# Patient Record
Sex: MALE | Race: WHITE | NOT HISPANIC OR LATINO
[De-identification: names, ages, dates, MRNs, and addresses within clinical notes are randomized per-mention and may not be internally consistent; named-entity substitution may affect disease eponyms.]

---

## 2017-01-10 ENCOUNTER — APPOINTMENT (OUTPATIENT)
Dept: PULMONOLOGY | Facility: CLINIC | Age: 62
End: 2017-01-10

## 2017-01-10 VITALS — SYSTOLIC BLOOD PRESSURE: 124 MMHG | BODY MASS INDEX: 24.61 KG/M2 | WEIGHT: 174 LBS | DIASTOLIC BLOOD PRESSURE: 80 MMHG

## 2017-01-10 RX ORDER — MEPOLIZUMAB 100 MG/ML
100 INJECTION, POWDER, FOR SOLUTION SUBCUTANEOUS
Refills: 0 | Status: COMPLETED | OUTPATIENT
Start: 2017-01-10

## 2017-01-10 RX ADMIN — MEPOLIZUMAB 1 MG: 100 INJECTION, POWDER, FOR SOLUTION SUBCUTANEOUS at 00:00

## 2017-01-30 ENCOUNTER — TRANSCRIPTION ENCOUNTER (OUTPATIENT)
Age: 62
End: 2017-01-30

## 2017-02-21 ENCOUNTER — APPOINTMENT (OUTPATIENT)
Dept: PULMONOLOGY | Facility: CLINIC | Age: 62
End: 2017-02-21

## 2017-03-23 ENCOUNTER — APPOINTMENT (OUTPATIENT)
Dept: PULMONOLOGY | Facility: CLINIC | Age: 62
End: 2017-03-23

## 2017-03-23 VITALS — DIASTOLIC BLOOD PRESSURE: 76 MMHG | SYSTOLIC BLOOD PRESSURE: 126 MMHG | WEIGHT: 315 LBS | BODY MASS INDEX: 245.43 KG/M2

## 2017-03-23 VITALS — WEIGHT: 173.5 LBS | BODY MASS INDEX: 24.54 KG/M2

## 2017-03-23 RX ORDER — MEPOLIZUMAB 100 MG/ML
100 INJECTION, POWDER, FOR SOLUTION SUBCUTANEOUS
Refills: 0 | Status: COMPLETED | OUTPATIENT
Start: 2017-03-23

## 2017-03-23 RX ADMIN — MEPOLIZUMAB 0 MG: 100 INJECTION, POWDER, FOR SOLUTION SUBCUTANEOUS at 00:00

## 2017-04-25 ENCOUNTER — APPOINTMENT (OUTPATIENT)
Dept: PULMONOLOGY | Facility: CLINIC | Age: 62
End: 2017-04-25

## 2017-04-28 ENCOUNTER — APPOINTMENT (OUTPATIENT)
Dept: PULMONOLOGY | Facility: CLINIC | Age: 62
End: 2017-04-28

## 2017-04-28 VITALS
WEIGHT: 173 LBS | DIASTOLIC BLOOD PRESSURE: 60 MMHG | SYSTOLIC BLOOD PRESSURE: 110 MMHG | HEIGHT: 70.5 IN | BODY MASS INDEX: 24.49 KG/M2

## 2017-04-28 RX ORDER — MEPOLIZUMAB 100 MG/ML
100 INJECTION, POWDER, FOR SOLUTION SUBCUTANEOUS
Refills: 0 | Status: COMPLETED | OUTPATIENT
Start: 2017-04-28

## 2017-04-28 RX ADMIN — MEPOLIZUMAB 100 MG: 100 INJECTION, POWDER, FOR SOLUTION SUBCUTANEOUS at 00:00

## 2017-06-08 ENCOUNTER — APPOINTMENT (OUTPATIENT)
Dept: PULMONOLOGY | Facility: CLINIC | Age: 62
End: 2017-06-08

## 2017-06-08 VITALS — SYSTOLIC BLOOD PRESSURE: 114 MMHG | DIASTOLIC BLOOD PRESSURE: 72 MMHG

## 2017-06-08 RX ORDER — MEPOLIZUMAB 100 MG/ML
100 INJECTION, POWDER, FOR SOLUTION SUBCUTANEOUS
Refills: 0 | Status: COMPLETED | OUTPATIENT
Start: 2017-06-08

## 2017-06-08 RX ADMIN — MEPOLIZUMAB 0 MG: 100 INJECTION, POWDER, FOR SOLUTION SUBCUTANEOUS at 00:00

## 2017-07-11 ENCOUNTER — APPOINTMENT (OUTPATIENT)
Dept: PULMONOLOGY | Facility: CLINIC | Age: 62
End: 2017-07-11

## 2017-07-12 ENCOUNTER — OUTPATIENT (OUTPATIENT)
Dept: OUTPATIENT SERVICES | Facility: HOSPITAL | Age: 62
LOS: 1 days | Discharge: HOME | End: 2017-07-12
Payer: COMMERCIAL

## 2017-07-12 VITALS
OXYGEN SATURATION: 100 % | SYSTOLIC BLOOD PRESSURE: 127 MMHG | TEMPERATURE: 98 F | WEIGHT: 167.99 LBS | DIASTOLIC BLOOD PRESSURE: 86 MMHG | RESPIRATION RATE: 18 BRPM | HEART RATE: 64 BPM | HEIGHT: 71 IN

## 2017-07-12 DIAGNOSIS — Z98.890 OTHER SPECIFIED POSTPROCEDURAL STATES: Chronic | ICD-10-CM

## 2017-07-12 DIAGNOSIS — M16.11 UNILATERAL PRIMARY OSTEOARTHRITIS, RIGHT HIP: ICD-10-CM

## 2017-07-12 LAB
ABO RH CONFIRMATION: SIGNIFICANT CHANGE UP
ANION GAP SERPL CALC-SCNC: 6 MMOL/L — SIGNIFICANT CHANGE UP (ref 5–17)
APPEARANCE UR: CLEAR — SIGNIFICANT CHANGE UP
BACTERIA # UR AUTO: (no result)
BASOPHILS # BLD AUTO: 0.1 K/UL — SIGNIFICANT CHANGE UP (ref 0–0.2)
BASOPHILS NFR BLD AUTO: 1.7 % — SIGNIFICANT CHANGE UP (ref 0–2)
BILIRUB UR-MCNC: NEGATIVE — SIGNIFICANT CHANGE UP
BLD GP AB SCN SERPL QL: SIGNIFICANT CHANGE UP
BUN SERPL-MCNC: 16 MG/DL — SIGNIFICANT CHANGE UP (ref 7–23)
CALCIUM SERPL-MCNC: 9.8 MG/DL — SIGNIFICANT CHANGE UP (ref 8.5–10.1)
CHLORIDE SERPL-SCNC: 105 MMOL/L — SIGNIFICANT CHANGE UP (ref 96–108)
CO2 SERPL-SCNC: 27 MMOL/L — SIGNIFICANT CHANGE UP (ref 22–31)
COLOR SPEC: YELLOW — SIGNIFICANT CHANGE UP
CREAT SERPL-MCNC: 0.77 MG/DL — SIGNIFICANT CHANGE UP (ref 0.5–1.3)
DIFF PNL FLD: (no result)
EOSINOPHIL # BLD AUTO: 0.1 K/UL — SIGNIFICANT CHANGE UP (ref 0–0.5)
EOSINOPHIL NFR BLD AUTO: 2 % — SIGNIFICANT CHANGE UP (ref 0–6)
EPI CELLS # UR: SIGNIFICANT CHANGE UP
GLUCOSE SERPL-MCNC: 83 MG/DL — SIGNIFICANT CHANGE UP (ref 70–99)
GLUCOSE UR QL: NEGATIVE MG/DL — SIGNIFICANT CHANGE UP
HCT VFR BLD CALC: 46.8 % — SIGNIFICANT CHANGE UP (ref 39–50)
HGB BLD-MCNC: 15.6 G/DL — SIGNIFICANT CHANGE UP (ref 13–17)
HYALINE CASTS # UR AUTO: (no result) /LPF
KETONES UR-MCNC: NEGATIVE — SIGNIFICANT CHANGE UP
LEUKOCYTE ESTERASE UR-ACNC: (no result)
LYMPHOCYTES # BLD AUTO: 0.9 K/UL — LOW (ref 1–3.3)
LYMPHOCYTES # BLD AUTO: 13.1 % — SIGNIFICANT CHANGE UP (ref 13–44)
MCHC RBC-ENTMCNC: 30.5 PG — SIGNIFICANT CHANGE UP (ref 27–34)
MCHC RBC-ENTMCNC: 33.4 GM/DL — SIGNIFICANT CHANGE UP (ref 32–36)
MCV RBC AUTO: 91.5 FL — SIGNIFICANT CHANGE UP (ref 80–100)
MONOCYTES # BLD AUTO: 0.8 K/UL — SIGNIFICANT CHANGE UP (ref 0–0.9)
MONOCYTES NFR BLD AUTO: 12.3 % — SIGNIFICANT CHANGE UP (ref 2–14)
MRSA PCR RESULT.: SIGNIFICANT CHANGE UP
NEUTROPHILS # BLD AUTO: 4.7 K/UL — SIGNIFICANT CHANGE UP (ref 1.8–7.4)
NEUTROPHILS NFR BLD AUTO: 70.9 % — SIGNIFICANT CHANGE UP (ref 43–77)
NITRITE UR-MCNC: NEGATIVE — SIGNIFICANT CHANGE UP
PH UR: 6 — SIGNIFICANT CHANGE UP (ref 5–8)
PLATELET # BLD AUTO: 233 K/UL — SIGNIFICANT CHANGE UP (ref 150–400)
POTASSIUM SERPL-MCNC: 4.2 MMOL/L — SIGNIFICANT CHANGE UP (ref 3.5–5.3)
POTASSIUM SERPL-SCNC: 4.2 MMOL/L — SIGNIFICANT CHANGE UP (ref 3.5–5.3)
PROT UR-MCNC: NEGATIVE MG/DL — SIGNIFICANT CHANGE UP
RBC # BLD: 5.12 M/UL — SIGNIFICANT CHANGE UP (ref 4.2–5.8)
RBC # FLD: 13.3 % — SIGNIFICANT CHANGE UP (ref 10.3–14.5)
RBC CASTS # UR COMP ASSIST: (no result) /HPF (ref 0–4)
S AUREUS DNA NOSE QL NAA+PROBE: SIGNIFICANT CHANGE UP
SODIUM SERPL-SCNC: 138 MMOL/L — SIGNIFICANT CHANGE UP (ref 135–145)
SP GR SPEC: 1.01 — SIGNIFICANT CHANGE UP (ref 1.01–1.02)
TYPE + AB SCN PNL BLD: SIGNIFICANT CHANGE UP
UROBILINOGEN FLD QL: NEGATIVE MG/DL — SIGNIFICANT CHANGE UP
WBC # BLD: 6.7 K/UL — SIGNIFICANT CHANGE UP (ref 3.8–10.5)
WBC # FLD AUTO: 6.7 K/UL — SIGNIFICANT CHANGE UP (ref 3.8–10.5)
WBC UR QL: SIGNIFICANT CHANGE UP

## 2017-07-12 PROCEDURE — 73502 X-RAY EXAM HIP UNI 2-3 VIEWS: CPT | Mod: 26

## 2017-07-12 RX ORDER — VALSARTAN 80 MG/1
1 TABLET ORAL
Qty: 0 | Refills: 0 | COMMUNITY

## 2017-07-12 NOTE — H&P PST ADULT - PSH
H/O arthroscopy of shoulder  left 2/ 2016  H/O hernia repair  11/3/2016  H/O lithotripsy  2015  H/O sinus surgery  x 3

## 2017-07-12 NOTE — H&P PST ADULT - FAMILY HISTORY
Father  Still living? No  Family history of multiple myeloma, Age at diagnosis: Age Unknown     Mother  Still living? No  Family history of pancreatic cancer, Age at diagnosis: Age Unknown

## 2017-07-12 NOTE — H&P PST ADULT - PMH
Diverticulosis of intestine without bleeding, unspecified intestinal tract location    Essential hypertension    History of kidney stones    Lumbar herniated disc    MVP (mitral valve prolapse)    Nasal polyps  history of  Osteoarthritis of right hip, unspecified osteoarthritis type    Seasonal allergic rhinitis due to pollen    Tinnitus of both ears    Uncomplicated asthma, unspecified asthma severity

## 2017-07-12 NOTE — H&P PST ADULT - ASSESSMENT
62 years old male present to PST prior to right hip replacement with Dr. Husam Olmedo III.  Plan   1. NPO after midnight  2. Take the following medications with sips of water on the day of procedure: Bystolic, Valsartan, Montelukast  3. Use E-Z sponge as directed  4. Use Mupirocin as directed.  5. Drink a quart of extra  fluids the day before your surgery.  6 Medical clearance with Dr. Kedar Gallego  7. CBC, BMP, Urinalysis, Type and Screen, MRSA sent to lab  8. EKG and Hip/Pelvic X-ray done      Caprini score of 8

## 2017-07-24 RX ORDER — OXYCODONE HYDROCHLORIDE 5 MG/1
10 TABLET ORAL EVERY 12 HOURS
Qty: 0 | Refills: 0 | Status: DISCONTINUED | OUTPATIENT
Start: 2017-07-25 | End: 2017-07-27

## 2017-07-24 RX ORDER — CELECOXIB 200 MG/1
200 CAPSULE ORAL ONCE
Qty: 0 | Refills: 0 | Status: COMPLETED | OUTPATIENT
Start: 2017-07-25 | End: 2017-07-25

## 2017-07-24 RX ORDER — MEPERIDINE HYDROCHLORIDE 50 MG/ML
12.5 INJECTION INTRAMUSCULAR; INTRAVENOUS; SUBCUTANEOUS
Qty: 0 | Refills: 0 | Status: DISCONTINUED | OUTPATIENT
Start: 2017-07-25 | End: 2017-07-25

## 2017-07-24 RX ORDER — SODIUM CHLORIDE 9 MG/ML
3 INJECTION INTRAMUSCULAR; INTRAVENOUS; SUBCUTANEOUS EVERY 8 HOURS
Qty: 0 | Refills: 0 | Status: DISCONTINUED | OUTPATIENT
Start: 2017-07-25 | End: 2017-07-27

## 2017-07-24 RX ORDER — ACETAMINOPHEN 500 MG
1000 TABLET ORAL ONCE
Qty: 0 | Refills: 0 | Status: COMPLETED | OUTPATIENT
Start: 2017-07-25 | End: 2017-07-25

## 2017-07-24 RX ORDER — HYDROMORPHONE HYDROCHLORIDE 2 MG/ML
1 INJECTION INTRAMUSCULAR; INTRAVENOUS; SUBCUTANEOUS
Qty: 0 | Refills: 0 | Status: DISCONTINUED | OUTPATIENT
Start: 2017-07-25 | End: 2017-07-27

## 2017-07-24 RX ORDER — OXYCODONE HYDROCHLORIDE 5 MG/1
10 TABLET ORAL
Qty: 0 | Refills: 0 | Status: DISCONTINUED | OUTPATIENT
Start: 2017-07-25 | End: 2017-07-27

## 2017-07-24 RX ORDER — HYDROMORPHONE HYDROCHLORIDE 2 MG/ML
1 INJECTION INTRAMUSCULAR; INTRAVENOUS; SUBCUTANEOUS
Qty: 0 | Refills: 0 | Status: DISCONTINUED | OUTPATIENT
Start: 2017-07-25 | End: 2017-07-25

## 2017-07-24 RX ORDER — OXYCODONE HYDROCHLORIDE 5 MG/1
5 TABLET ORAL
Qty: 0 | Refills: 0 | Status: DISCONTINUED | OUTPATIENT
Start: 2017-07-25 | End: 2017-07-27

## 2017-07-24 RX ORDER — ONDANSETRON 8 MG/1
4 TABLET, FILM COATED ORAL EVERY 6 HOURS
Qty: 0 | Refills: 0 | Status: DISCONTINUED | OUTPATIENT
Start: 2017-07-25 | End: 2017-07-27

## 2017-07-24 RX ORDER — ACETAMINOPHEN 500 MG
650 TABLET ORAL EVERY 6 HOURS
Qty: 0 | Refills: 0 | Status: DISCONTINUED | OUTPATIENT
Start: 2017-07-25 | End: 2017-07-27

## 2017-07-24 RX ORDER — FENTANYL CITRATE 50 UG/ML
50 INJECTION INTRAVENOUS
Qty: 0 | Refills: 0 | Status: DISCONTINUED | OUTPATIENT
Start: 2017-07-25 | End: 2017-07-25

## 2017-07-24 RX ORDER — ACETAMINOPHEN 500 MG
650 TABLET ORAL ONCE
Qty: 0 | Refills: 0 | Status: COMPLETED | OUTPATIENT
Start: 2017-07-25 | End: 2017-07-25

## 2017-07-24 RX ORDER — SODIUM CHLORIDE 9 MG/ML
1000 INJECTION INTRAMUSCULAR; INTRAVENOUS; SUBCUTANEOUS
Qty: 0 | Refills: 0 | Status: DISCONTINUED | OUTPATIENT
Start: 2017-07-25 | End: 2017-07-25

## 2017-07-24 RX ORDER — OXYCODONE HYDROCHLORIDE 5 MG/1
10 TABLET ORAL ONCE
Qty: 0 | Refills: 0 | Status: DISCONTINUED | OUTPATIENT
Start: 2017-07-25 | End: 2017-07-25

## 2017-07-24 RX ORDER — ONDANSETRON 8 MG/1
4 TABLET, FILM COATED ORAL ONCE
Qty: 0 | Refills: 0 | Status: DISCONTINUED | OUTPATIENT
Start: 2017-07-25 | End: 2017-07-25

## 2017-07-24 RX ORDER — CELECOXIB 200 MG/1
200 CAPSULE ORAL
Qty: 0 | Refills: 0 | Status: DISCONTINUED | OUTPATIENT
Start: 2017-07-25 | End: 2017-07-27

## 2017-07-25 ENCOUNTER — RESULT REVIEW (OUTPATIENT)
Age: 62
End: 2017-07-25

## 2017-07-25 ENCOUNTER — INPATIENT (INPATIENT)
Facility: HOSPITAL | Age: 62
LOS: 1 days | Discharge: HOME HEALTH | End: 2017-07-27
Attending: ORTHOPAEDIC SURGERY | Admitting: ORTHOPAEDIC SURGERY
Payer: COMMERCIAL

## 2017-07-25 VITALS
WEIGHT: 167.99 LBS | DIASTOLIC BLOOD PRESSURE: 95 MMHG | HEART RATE: 49 BPM | TEMPERATURE: 98 F | HEIGHT: 72 IN | RESPIRATION RATE: 16 BRPM | SYSTOLIC BLOOD PRESSURE: 144 MMHG

## 2017-07-25 DIAGNOSIS — Z98.890 OTHER SPECIFIED POSTPROCEDURAL STATES: Chronic | ICD-10-CM

## 2017-07-25 LAB
ANION GAP SERPL CALC-SCNC: 9 MMOL/L — SIGNIFICANT CHANGE UP (ref 5–17)
APTT BLD: 33.8 SEC — SIGNIFICANT CHANGE UP (ref 27.5–37.4)
BUN SERPL-MCNC: 13 MG/DL — SIGNIFICANT CHANGE UP (ref 7–23)
CALCIUM SERPL-MCNC: 8.2 MG/DL — LOW (ref 8.5–10.1)
CHLORIDE SERPL-SCNC: 111 MMOL/L — HIGH (ref 96–108)
CO2 SERPL-SCNC: 24 MMOL/L — SIGNIFICANT CHANGE UP (ref 22–31)
CREAT SERPL-MCNC: 0.68 MG/DL — SIGNIFICANT CHANGE UP (ref 0.5–1.3)
GLUCOSE SERPL-MCNC: 98 MG/DL — SIGNIFICANT CHANGE UP (ref 70–99)
HCT VFR BLD CALC: 36.1 % — LOW (ref 39–50)
HGB BLD-MCNC: 11.8 G/DL — LOW (ref 13–17)
INR BLD: 0.97 RATIO — SIGNIFICANT CHANGE UP (ref 0.88–1.16)
MCHC RBC-ENTMCNC: 29.8 PG — SIGNIFICANT CHANGE UP (ref 27–34)
MCHC RBC-ENTMCNC: 32.6 GM/DL — SIGNIFICANT CHANGE UP (ref 32–36)
MCV RBC AUTO: 91.4 FL — SIGNIFICANT CHANGE UP (ref 80–100)
PLATELET # BLD AUTO: 204 K/UL — SIGNIFICANT CHANGE UP (ref 150–400)
POTASSIUM SERPL-MCNC: 3.6 MMOL/L — SIGNIFICANT CHANGE UP (ref 3.5–5.3)
POTASSIUM SERPL-SCNC: 3.6 MMOL/L — SIGNIFICANT CHANGE UP (ref 3.5–5.3)
PROTHROM AB SERPL-ACNC: 10.5 SEC — SIGNIFICANT CHANGE UP (ref 9.8–12.7)
RBC # BLD: 3.95 M/UL — LOW (ref 4.2–5.8)
RBC # FLD: 13.2 % — SIGNIFICANT CHANGE UP (ref 10.3–14.5)
SODIUM SERPL-SCNC: 144 MMOL/L — SIGNIFICANT CHANGE UP (ref 135–145)
WBC # BLD: 4.2 K/UL — SIGNIFICANT CHANGE UP (ref 3.8–10.5)
WBC # FLD AUTO: 4.2 K/UL — SIGNIFICANT CHANGE UP (ref 3.8–10.5)

## 2017-07-25 PROCEDURE — 74000: CPT | Mod: 26

## 2017-07-25 PROCEDURE — 73501 X-RAY EXAM HIP UNI 1 VIEW: CPT | Mod: 26

## 2017-07-25 PROCEDURE — 88305 TISSUE EXAM BY PATHOLOGIST: CPT | Mod: 26

## 2017-07-25 PROCEDURE — 99253 IP/OBS CNSLTJ NEW/EST LOW 45: CPT

## 2017-07-25 RX ORDER — ZOLPIDEM TARTRATE 10 MG/1
5 TABLET ORAL AT BEDTIME
Qty: 0 | Refills: 0 | Status: DISCONTINUED | OUTPATIENT
Start: 2017-07-25 | End: 2017-07-27

## 2017-07-25 RX ORDER — HYDROCHLOROTHIAZIDE 25 MG
12.5 TABLET ORAL DAILY
Qty: 0 | Refills: 0 | Status: DISCONTINUED | OUTPATIENT
Start: 2017-07-25 | End: 2017-07-25

## 2017-07-25 RX ORDER — VALSARTAN 80 MG/1
40 TABLET ORAL DAILY
Qty: 0 | Refills: 0 | Status: DISCONTINUED | OUTPATIENT
Start: 2017-07-25 | End: 2017-07-27

## 2017-07-25 RX ORDER — MORPHINE SULFATE 50 MG/1
4 CAPSULE, EXTENDED RELEASE ORAL
Qty: 0 | Refills: 0 | Status: DISCONTINUED | OUTPATIENT
Start: 2017-07-25 | End: 2017-07-27

## 2017-07-25 RX ORDER — HYDROMORPHONE HYDROCHLORIDE 2 MG/ML
1 INJECTION INTRAMUSCULAR; INTRAVENOUS; SUBCUTANEOUS
Qty: 0 | Refills: 0 | Status: DISCONTINUED | OUTPATIENT
Start: 2017-07-25 | End: 2017-07-27

## 2017-07-25 RX ORDER — ACETAMINOPHEN 500 MG
650 TABLET ORAL EVERY 6 HOURS
Qty: 0 | Refills: 0 | Status: DISCONTINUED | OUTPATIENT
Start: 2017-07-25 | End: 2017-07-27

## 2017-07-25 RX ORDER — ASCORBIC ACID 60 MG
500 TABLET,CHEWABLE ORAL
Qty: 0 | Refills: 0 | Status: DISCONTINUED | OUTPATIENT
Start: 2017-07-25 | End: 2017-07-27

## 2017-07-25 RX ORDER — MONTELUKAST 4 MG/1
10 TABLET, CHEWABLE ORAL DAILY
Qty: 0 | Refills: 0 | Status: DISCONTINUED | OUTPATIENT
Start: 2017-07-25 | End: 2017-07-27

## 2017-07-25 RX ORDER — FLUTICASONE PROPIONATE AND SALMETEROL 50; 250 UG/1; UG/1
1 POWDER ORAL; RESPIRATORY (INHALATION) DAILY
Qty: 0 | Refills: 0 | Status: DISCONTINUED | OUTPATIENT
Start: 2017-07-25 | End: 2017-07-27

## 2017-07-25 RX ORDER — POLYETHYLENE GLYCOL 3350 17 G/17G
17 POWDER, FOR SOLUTION ORAL DAILY
Qty: 0 | Refills: 0 | Status: DISCONTINUED | OUTPATIENT
Start: 2017-07-25 | End: 2017-07-27

## 2017-07-25 RX ORDER — ONDANSETRON 8 MG/1
4 TABLET, FILM COATED ORAL EVERY 6 HOURS
Qty: 0 | Refills: 0 | Status: DISCONTINUED | OUTPATIENT
Start: 2017-07-25 | End: 2017-07-27

## 2017-07-25 RX ORDER — BENZOCAINE AND MENTHOL 5; 1 G/100ML; G/100ML
1 LIQUID ORAL EVERY 4 HOURS
Qty: 0 | Refills: 0 | Status: DISCONTINUED | OUTPATIENT
Start: 2017-07-25 | End: 2017-07-27

## 2017-07-25 RX ORDER — OXYCODONE HYDROCHLORIDE 5 MG/1
5 TABLET ORAL
Qty: 0 | Refills: 0 | Status: DISCONTINUED | OUTPATIENT
Start: 2017-07-25 | End: 2017-07-27

## 2017-07-25 RX ORDER — MAGNESIUM HYDROXIDE 400 MG/1
30 TABLET, CHEWABLE ORAL DAILY
Qty: 0 | Refills: 0 | Status: DISCONTINUED | OUTPATIENT
Start: 2017-07-25 | End: 2017-07-27

## 2017-07-25 RX ORDER — ALPRAZOLAM 0.25 MG
0.25 TABLET ORAL ONCE
Qty: 0 | Refills: 0 | Status: DISCONTINUED | OUTPATIENT
Start: 2017-07-25 | End: 2017-07-25

## 2017-07-25 RX ORDER — FERROUS SULFATE 325(65) MG
325 TABLET ORAL
Qty: 0 | Refills: 0 | Status: DISCONTINUED | OUTPATIENT
Start: 2017-07-25 | End: 2017-07-27

## 2017-07-25 RX ORDER — ROPIVACAINE HCL/PF 5 MG/ML
200 AMPUL (ML) INJECTION
Qty: 0 | Refills: 0 | Status: DISCONTINUED | OUTPATIENT
Start: 2017-07-25 | End: 2017-07-25

## 2017-07-25 RX ORDER — FOLIC ACID 0.8 MG
1 TABLET ORAL DAILY
Qty: 0 | Refills: 0 | Status: DISCONTINUED | OUTPATIENT
Start: 2017-07-25 | End: 2017-07-27

## 2017-07-25 RX ORDER — DOCUSATE SODIUM 100 MG
100 CAPSULE ORAL THREE TIMES A DAY
Qty: 0 | Refills: 0 | Status: DISCONTINUED | OUTPATIENT
Start: 2017-07-25 | End: 2017-07-27

## 2017-07-25 RX ORDER — SENNA PLUS 8.6 MG/1
2 TABLET ORAL AT BEDTIME
Qty: 0 | Refills: 0 | Status: DISCONTINUED | OUTPATIENT
Start: 2017-07-25 | End: 2017-07-27

## 2017-07-25 RX ORDER — OXYCODONE HYDROCHLORIDE 5 MG/1
10 TABLET ORAL EVERY 4 HOURS
Qty: 0 | Refills: 0 | Status: DISCONTINUED | OUTPATIENT
Start: 2017-07-25 | End: 2017-07-27

## 2017-07-25 RX ORDER — CEFAZOLIN SODIUM 1 G
2000 VIAL (EA) INJECTION EVERY 6 HOURS
Qty: 0 | Refills: 0 | Status: COMPLETED | OUTPATIENT
Start: 2017-07-25 | End: 2017-07-25

## 2017-07-25 RX ORDER — ASPIRIN/CALCIUM CARB/MAGNESIUM 324 MG
325 TABLET ORAL
Qty: 0 | Refills: 0 | Status: DISCONTINUED | OUTPATIENT
Start: 2017-07-26 | End: 2017-07-27

## 2017-07-25 RX ORDER — FAMOTIDINE 10 MG/ML
20 INJECTION INTRAVENOUS ONCE
Qty: 0 | Refills: 0 | Status: COMPLETED | OUTPATIENT
Start: 2017-07-25 | End: 2017-07-25

## 2017-07-25 RX ORDER — ROPIVACAINE HCL/PF 5 MG/ML
200 AMPUL (ML) INJECTION
Qty: 0 | Refills: 0 | Status: DISCONTINUED | OUTPATIENT
Start: 2017-07-25 | End: 2017-07-27

## 2017-07-25 RX ORDER — DIPHENHYDRAMINE HCL 50 MG
25 CAPSULE ORAL EVERY 4 HOURS
Qty: 0 | Refills: 0 | Status: DISCONTINUED | OUTPATIENT
Start: 2017-07-25 | End: 2017-07-27

## 2017-07-25 RX ORDER — OXYCODONE HYDROCHLORIDE 5 MG/1
10 TABLET ORAL
Qty: 0 | Refills: 0 | Status: DISCONTINUED | OUTPATIENT
Start: 2017-07-25 | End: 2017-07-27

## 2017-07-25 RX ORDER — DIPHENHYDRAMINE HCL 50 MG
25 CAPSULE ORAL AT BEDTIME
Qty: 0 | Refills: 0 | Status: DISCONTINUED | OUTPATIENT
Start: 2017-07-25 | End: 2017-07-25

## 2017-07-25 RX ORDER — OXYCODONE HYDROCHLORIDE 5 MG/1
5 TABLET ORAL EVERY 4 HOURS
Qty: 0 | Refills: 0 | Status: DISCONTINUED | OUTPATIENT
Start: 2017-07-25 | End: 2017-07-27

## 2017-07-25 RX ORDER — SODIUM CHLORIDE 9 MG/ML
1000 INJECTION, SOLUTION INTRAVENOUS
Qty: 0 | Refills: 0 | Status: DISCONTINUED | OUTPATIENT
Start: 2017-07-25 | End: 2017-07-27

## 2017-07-25 RX ORDER — NEBIVOLOL HYDROCHLORIDE 5 MG/1
2.5 TABLET ORAL DAILY
Qty: 0 | Refills: 0 | Status: DISCONTINUED | OUTPATIENT
Start: 2017-07-25 | End: 2017-07-27

## 2017-07-25 RX ADMIN — Medication 650 MILLIGRAM(S): at 07:17

## 2017-07-25 RX ADMIN — HYDROMORPHONE HYDROCHLORIDE 1 MILLIGRAM(S): 2 INJECTION INTRAMUSCULAR; INTRAVENOUS; SUBCUTANEOUS at 13:30

## 2017-07-25 RX ADMIN — Medication 1000 MILLIGRAM(S): at 12:08

## 2017-07-25 RX ADMIN — FENTANYL CITRATE 50 MICROGRAM(S): 50 INJECTION INTRAVENOUS at 10:29

## 2017-07-25 RX ADMIN — OXYCODONE HYDROCHLORIDE 10 MILLIGRAM(S): 5 TABLET ORAL at 07:17

## 2017-07-25 RX ADMIN — SODIUM CHLORIDE 75 MILLILITER(S): 9 INJECTION, SOLUTION INTRAVENOUS at 17:52

## 2017-07-25 RX ADMIN — SODIUM CHLORIDE 3 MILLILITER(S): 9 INJECTION INTRAMUSCULAR; INTRAVENOUS; SUBCUTANEOUS at 20:03

## 2017-07-25 RX ADMIN — SODIUM CHLORIDE 3 MILLILITER(S): 9 INJECTION INTRAMUSCULAR; INTRAVENOUS; SUBCUTANEOUS at 13:13

## 2017-07-25 RX ADMIN — Medication 100 MILLIGRAM(S): at 21:33

## 2017-07-25 RX ADMIN — Medication 100 MILLIGRAM(S): at 13:16

## 2017-07-25 RX ADMIN — Medication 650 MILLIGRAM(S): at 17:53

## 2017-07-25 RX ADMIN — CELECOXIB 200 MILLIGRAM(S): 200 CAPSULE ORAL at 07:17

## 2017-07-25 RX ADMIN — OXYCODONE HYDROCHLORIDE 10 MILLIGRAM(S): 5 TABLET ORAL at 17:48

## 2017-07-25 RX ADMIN — FENTANYL CITRATE 50 MICROGRAM(S): 50 INJECTION INTRAVENOUS at 10:43

## 2017-07-25 RX ADMIN — Medication 325 MILLIGRAM(S): at 17:06

## 2017-07-25 RX ADMIN — SODIUM CHLORIDE 75 MILLILITER(S): 9 INJECTION INTRAMUSCULAR; INTRAVENOUS; SUBCUTANEOUS at 09:51

## 2017-07-25 RX ADMIN — OXYCODONE HYDROCHLORIDE 10 MILLIGRAM(S): 5 TABLET ORAL at 16:20

## 2017-07-25 RX ADMIN — OXYCODONE HYDROCHLORIDE 10 MILLIGRAM(S): 5 TABLET ORAL at 15:25

## 2017-07-25 RX ADMIN — Medication 200 MILLILITER(S): at 11:12

## 2017-07-25 RX ADMIN — MONTELUKAST 10 MILLIGRAM(S): 4 TABLET, CHEWABLE ORAL at 23:19

## 2017-07-25 RX ADMIN — Medication 650 MILLIGRAM(S): at 23:20

## 2017-07-25 RX ADMIN — Medication 500 MILLIGRAM(S): at 17:06

## 2017-07-25 RX ADMIN — OXYCODONE HYDROCHLORIDE 10 MILLIGRAM(S): 5 TABLET ORAL at 20:02

## 2017-07-25 RX ADMIN — OXYCODONE HYDROCHLORIDE 10 MILLIGRAM(S): 5 TABLET ORAL at 17:06

## 2017-07-25 RX ADMIN — Medication 325 MILLIGRAM(S): at 13:16

## 2017-07-25 RX ADMIN — Medication 400 MILLIGRAM(S): at 11:38

## 2017-07-25 RX ADMIN — Medication 0.25 MILLIGRAM(S): at 23:19

## 2017-07-25 RX ADMIN — HYDROMORPHONE HYDROCHLORIDE 1 MILLIGRAM(S): 2 INJECTION INTRAMUSCULAR; INTRAVENOUS; SUBCUTANEOUS at 13:16

## 2017-07-25 NOTE — CONSULT NOTE ADULT - SUBJECTIVE AND OBJECTIVE BOX
PCP: Dr. Kedar Gallego    Chief Complaint: right hip pain    HPI: 62M, pmh of HTN, HLD, Mitral regurgitation, diverticulosis, nephrolithiasis, lumbar herniated disc, tinnitus, asthma, fatty liver, OA right hip who is admitted for elective right hip replacement. Hospitalist service consulted for medical comanagement.   Pt seen and examined on 2N. Feels well. Worked with physical therapy already and states he did great. He does have some pain/soreness at surgical site now. NO sob/chest pain. NO n/v/d/abd pain.     REVIEW OF SYSTEMS:all 10 systems reviewed and is as per hpi otherwise negative.     PMH: as above  PSH: Left shoulder athroscopy, hernia repair, lithotripsy, sinus surgery, rotator cuff surgery,   F/H: father-multiple myeloma, Mother-pancreatic  ca  Social: No significant tobacco use.              ETOH: occasional.   Allergies: NKDA  H. MEds: please see med rec.    Vital Signs Last 24 Hrs  T(C): 36.2 (25 Jul 2017 12:10), Max: 36.6 (25 Jul 2017 07:00)  T(F): 97.1 (25 Jul 2017 12:10), Max: 97.8 (25 Jul 2017 07:00)  HR: 46 (25 Jul 2017 12:10) (44 - 49)  BP: 115/83 (25 Jul 2017 12:10) (101/61 - 144/95)  BP(mean): --  RR: 16 (25 Jul 2017 12:10) (12 - 16)  SpO2: 98% (25 Jul 2017 12:10) (98% - 100%)      PHYSICAL EXAM:    Constitutional: NAD, awake and alert, well-developed  HEENT: PERR, EOMI, Normal Hearing, MMM  Neck: Soft and supple, No LAD, No JVD  Respiratory: Breath sounds are clear bilaterally, No wheezing, rales or rhonchi  Cardiovascular: S1 and S2, regular rate and rhythm,   Gastrointestinal: Bowel Sounds present, soft, nontender, nondistended, no guarding, no rebound  Extremities: No peripheral edema. RLE dressing intact. +hemovac  Vascular: 2+ peripheral pulses  Neurological: A/O x 3, no focal deficits  Musculoskeletal: 5/5 strength b/l upper and lower extremities  Skin: No rashes    Medications:  MEDICATIONS  (STANDING):  sodium chloride 0.9% lock flush 3 milliLiter(s) IV Push every 8 hours  acetaminophen   Tablet 650 milliGRAM(s) Oral every 6 hours  oxyCODONE  ER Tablet 10 milliGRAM(s) Oral every 12 hours  celecoxib 200 milliGRAM(s) Oral with breakfast  valsartan 40 milliGRAM(s) Oral daily  montelukast 10 milliGRAM(s) Oral daily  hydrochlorothiazide 12.5 milliGRAM(s) Oral daily  lactated ringers. 1000 milliLiter(s) (75 mL/Hr) IV Continuous <Continuous>  ceFAZolin   IVPB 2000 milliGRAM(s) IV Intermittent every 6 hours  polyethylene glycol 3350 17 Gram(s) Oral daily  docusate sodium 100 milliGRAM(s) Oral three times a day  ferrous    sulfate 325 milliGRAM(s) Oral three times a day with meals  folic acid 1 milliGRAM(s) Oral daily  multivitamin 1 Tablet(s) Oral daily  ascorbic acid 500 milliGRAM(s) Oral two times a day  ropivacaine 0.2% in 0.9% Sodium Chloride PCRA 200 milliLiter(s) Regional Catheter PCA Continuous      Labs: All Labs Reviewed:                        11.8   4.2   )-----------( 204      ( 25 Jul 2017 09:52 )             36.1     07-25    144  |  111<H>  |  13  ----------------------------<  98  3.6   |  24  |  0.68    Ca    8.2<L>      25 Jul 2017 09:52      PT/INR - ( 25 Jul 2017 07:17 )   PT: 10.5 sec;   INR: 0.97 ratio         PTT - ( 25 Jul 2017 07:17 )  PTT:33.8 sec    Assessment and plan:  62M, pmh as above a/w:    1. Right hip OA s/p THR:  -POD#0  -pain control  -physical therapy  -incentive spirometry    2. HTN:  -stable.  -continue arb, bystlic  -hold hctz while on ivf    3. HLD:  -intolerant to statins    4. Asthma:  -stable  -continue advair    5. DVT px.    Thank you for this consultation, will follow with you.

## 2017-07-25 NOTE — PHYSICAL THERAPY INITIAL EVALUATION ADULT - CRITERIA FOR SKILLED THERAPEUTIC INTERVENTIONS
therapy frequency/functional limitations in following categories/impairments found/anticipated discharge recommendation/risk reduction/prevention/rehab potential/predicted duration of therapy intervention/anticipated equipment needs at discharge

## 2017-07-25 NOTE — CONSULT NOTE ADULT - ASSESSMENT
This is a 62 year old male s/p right THR today 7-25-17.  Pt has high thrombosis risk and requires anticoagulation prophylaxis.      Aspirin 325mg  Enteric-Coated twice a day for four weeks post procedure  :daily cbc/bmp  :le venodynes  :increase mobility as tolerated  :thanks for consult will sign off please reconsult if needed.

## 2017-07-25 NOTE — PHYSICAL THERAPY INITIAL EVALUATION ADULT - ACTIVE RANGE OF MOTION EXAMINATION, REHAB EVAL
bilateral  lower extremity Active ROM was WFL (within functional limits)/except R hip flex at least 20-30 deg. R knee flex at least 90 deg while sitting and about 40 deg in supine to avoide R hip precautions/bilateral upper extremity Active ROM was WFL (within functional limits)

## 2017-07-25 NOTE — PROGRESS NOTE ADULT - SUBJECTIVE AND OBJECTIVE BOX
Orthopedics Post-op Check    POD 0 Total Hip Arthroplasty  Pain is moderate. No nausea or vomiting. Not lightheaded.    Vital Signs Last 24 Hrs  T(C): 36.4 (07-25-17 @ 11:30), Max: 36.6 (07-25-17 @ 07:00)  T(F): 97.5 (07-25-17 @ 11:30), Max: 97.8 (07-25-17 @ 07:00)  HR: 47 (07-25-17 @ 11:30) (44 - 49)  BP: 117/74 (07-25-17 @ 11:30) (101/61 - 144/95)  BP(mean): --  RR: 14 (07-25-17 @ 11:30) (12 - 16)  SpO2: 100% (07-25-17 @ 11:30) (99% - 100%)                        11.8   4.2   )-----------( 204      ( 25 Jul 2017 09:52 )             36.1     25 Jul 2017 09:52    144    |  111    |  13     ----------------------------<  98     3.6     |  24     |  0.68     Ca    8.2        25 Jul 2017 09:52      PT/INR - ( 25 Jul 2017 07:17 )   PT: 10.5 sec;   INR: 0.97 ratio         PTT - ( 25 Jul 2017 07:17 )  PTT:33.8 sec  Exam:  NAD AAOx3  Dressing clean and dry  +EHL FHL TA GS  SILT toes 1-5  +DP  Calf Soft NT    A/P:  Stable POD 0 Right Total Hip Arthroplasty  -Analgesia  -Ppx ABX  -DVT PE ppx  -OOB PT posterior dislocation precautions

## 2017-07-25 NOTE — PHYSICAL THERAPY INITIAL EVALUATION ADULT - GENERAL OBSERVATIONS, REHAB EVAL
Pt. rec'd supine in bed on 2N w/ flowtrons, HVAC, ABD pillow, IV, CRA in place. Pt. agreeable to participate with PT and OOB.

## 2017-07-26 ENCOUNTER — TRANSCRIPTION ENCOUNTER (OUTPATIENT)
Age: 62
End: 2017-07-26

## 2017-07-26 LAB
ANION GAP SERPL CALC-SCNC: 7 MMOL/L — SIGNIFICANT CHANGE UP (ref 5–17)
BUN SERPL-MCNC: 9 MG/DL — SIGNIFICANT CHANGE UP (ref 7–23)
CALCIUM SERPL-MCNC: 8.4 MG/DL — LOW (ref 8.5–10.1)
CHLORIDE SERPL-SCNC: 105 MMOL/L — SIGNIFICANT CHANGE UP (ref 96–108)
CO2 SERPL-SCNC: 28 MMOL/L — SIGNIFICANT CHANGE UP (ref 22–31)
CREAT SERPL-MCNC: 0.6 MG/DL — SIGNIFICANT CHANGE UP (ref 0.5–1.3)
GLUCOSE SERPL-MCNC: 95 MG/DL — SIGNIFICANT CHANGE UP (ref 70–99)
HCT VFR BLD CALC: 35.8 % — LOW (ref 39–50)
HGB BLD-MCNC: 11.9 G/DL — LOW (ref 13–17)
MCHC RBC-ENTMCNC: 30.6 PG — SIGNIFICANT CHANGE UP (ref 27–34)
MCHC RBC-ENTMCNC: 33.3 GM/DL — SIGNIFICANT CHANGE UP (ref 32–36)
MCV RBC AUTO: 91.8 FL — SIGNIFICANT CHANGE UP (ref 80–100)
PLATELET # BLD AUTO: 179 K/UL — SIGNIFICANT CHANGE UP (ref 150–400)
POTASSIUM SERPL-MCNC: 4.2 MMOL/L — SIGNIFICANT CHANGE UP (ref 3.5–5.3)
POTASSIUM SERPL-SCNC: 4.2 MMOL/L — SIGNIFICANT CHANGE UP (ref 3.5–5.3)
RBC # BLD: 3.9 M/UL — LOW (ref 4.2–5.8)
RBC # FLD: 13.5 % — SIGNIFICANT CHANGE UP (ref 10.3–14.5)
SODIUM SERPL-SCNC: 140 MMOL/L — SIGNIFICANT CHANGE UP (ref 135–145)
SURGICAL PATHOLOGY FINAL REPORT - CH: SIGNIFICANT CHANGE UP
WBC # BLD: 5.9 K/UL — SIGNIFICANT CHANGE UP (ref 3.8–10.5)
WBC # FLD AUTO: 5.9 K/UL — SIGNIFICANT CHANGE UP (ref 3.8–10.5)

## 2017-07-26 RX ADMIN — Medication 325 MILLIGRAM(S): at 08:49

## 2017-07-26 RX ADMIN — Medication 325 MILLIGRAM(S): at 06:00

## 2017-07-26 RX ADMIN — OXYCODONE HYDROCHLORIDE 10 MILLIGRAM(S): 5 TABLET ORAL at 18:23

## 2017-07-26 RX ADMIN — MONTELUKAST 10 MILLIGRAM(S): 4 TABLET, CHEWABLE ORAL at 12:22

## 2017-07-26 RX ADMIN — SODIUM CHLORIDE 3 MILLILITER(S): 9 INJECTION INTRAMUSCULAR; INTRAVENOUS; SUBCUTANEOUS at 22:07

## 2017-07-26 RX ADMIN — Medication 500 MILLIGRAM(S): at 06:00

## 2017-07-26 RX ADMIN — POLYETHYLENE GLYCOL 3350 17 GRAM(S): 17 POWDER, FOR SOLUTION ORAL at 12:19

## 2017-07-26 RX ADMIN — ZOLPIDEM TARTRATE 5 MILLIGRAM(S): 10 TABLET ORAL at 22:09

## 2017-07-26 RX ADMIN — Medication 650 MILLIGRAM(S): at 12:18

## 2017-07-26 RX ADMIN — Medication 1 TABLET(S): at 12:20

## 2017-07-26 RX ADMIN — OXYCODONE HYDROCHLORIDE 10 MILLIGRAM(S): 5 TABLET ORAL at 06:01

## 2017-07-26 RX ADMIN — ZOLPIDEM TARTRATE 5 MILLIGRAM(S): 10 TABLET ORAL at 01:35

## 2017-07-26 RX ADMIN — Medication 1 MILLIGRAM(S): at 12:20

## 2017-07-26 RX ADMIN — FLUTICASONE PROPIONATE AND SALMETEROL 1 DOSE(S): 50; 250 POWDER ORAL; RESPIRATORY (INHALATION) at 07:55

## 2017-07-26 RX ADMIN — OXYCODONE HYDROCHLORIDE 10 MILLIGRAM(S): 5 TABLET ORAL at 20:19

## 2017-07-26 RX ADMIN — Medication 100 MILLIGRAM(S): at 14:21

## 2017-07-26 RX ADMIN — CELECOXIB 200 MILLIGRAM(S): 200 CAPSULE ORAL at 08:48

## 2017-07-26 RX ADMIN — SODIUM CHLORIDE 3 MILLILITER(S): 9 INJECTION INTRAMUSCULAR; INTRAVENOUS; SUBCUTANEOUS at 05:51

## 2017-07-26 RX ADMIN — SODIUM CHLORIDE 3 MILLILITER(S): 9 INJECTION INTRAMUSCULAR; INTRAVENOUS; SUBCUTANEOUS at 14:20

## 2017-07-26 RX ADMIN — Medication 650 MILLIGRAM(S): at 06:01

## 2017-07-26 RX ADMIN — OXYCODONE HYDROCHLORIDE 10 MILLIGRAM(S): 5 TABLET ORAL at 14:19

## 2017-07-26 RX ADMIN — OXYCODONE HYDROCHLORIDE 10 MILLIGRAM(S): 5 TABLET ORAL at 21:19

## 2017-07-26 RX ADMIN — Medication 325 MILLIGRAM(S): at 12:21

## 2017-07-26 RX ADMIN — Medication 30 MILLILITER(S): at 18:22

## 2017-07-26 RX ADMIN — Medication 100 MILLIGRAM(S): at 06:00

## 2017-07-26 RX ADMIN — Medication 650 MILLIGRAM(S): at 18:23

## 2017-07-26 RX ADMIN — OXYCODONE HYDROCHLORIDE 10 MILLIGRAM(S): 5 TABLET ORAL at 08:58

## 2017-07-26 RX ADMIN — Medication 325 MILLIGRAM(S): at 18:22

## 2017-07-26 NOTE — DISCHARGE NOTE ADULT - MEDICATION SUMMARY - MEDICATIONS TO TAKE
I will START or STAY ON the medications listed below when I get home from the hospital:    aspirin 81 mg oral tablet  -- 1 tab(s) by mouth once a day  -- Indication: For home med    oxyCODONE 10 mg oral tablet  -- 1 tab(s) by mouth every 4 hours, As needed, Moderate Pain  -- Indication: For pain    oxyCODONE 5 mg oral tablet  -- 1 tab(s) by mouth every 3 hours, As needed, Mild Pain  -- Indication: For pain    Aspirin Enteric Coated 325 mg oral delayed release tablet  -- 1 tab(s) by mouth every 12 hours. For Blood Clot Prevention  -- Swallow whole.  Do not crush.  Take with food or milk.    -- Indication: For dvt ppx    valsartan 40 mg oral tablet  -- 1 tab(s) by mouth once a day  -- Indication: For home med    Bystolic 2.5 mg oral tablet  -- 1 tab(s) by mouth once a day  -- Indication: For home med    Advair Diskus 500 mcg-50 mcg inhalation powder  -- 1 puff(s) inhaled once a day  -- Indication: For home med    hydroCHLOROthiazide 12.5 mg oral capsule  -- 1 cap(s) by mouth once a day  -- Indication: For home med    famotidine 20 mg oral tablet  -- 1 tab(s) by mouth every 12 hours  -- It is very important that you take or use this exactly as directed.  Do not skip doses or discontinue unless directed by your doctor.  Obtain medical advice before taking any non-prescription drugs as some may affect the action of this medication.    -- Indication: For ulcer ppx    montelukast 10 mg oral tablet  -- 1 tab(s) by mouth once a day  -- Indication: For home med    multivitamin  -- 1 cap(s) by mouth once a day  -- Indication: For home med I will START or STAY ON the medications listed below when I get home from the hospital:    oxyCODONE 10 mg oral tablet  -- 1 tab(s) by mouth every 4 hours, As needed, Moderate Pain  -- Indication: For pain    oxyCODONE 5 mg oral tablet  -- 1 tab(s) by mouth every 3 hours, As needed, Mild Pain  -- Indication: For pain    Aspirin Enteric Coated 325 mg oral delayed release tablet  -- 1 tab(s) by mouth every 12 hours. For Blood Clot Prevention  -- Swallow whole.  Do not crush.  Take with food or milk.    -- Indication: For dvt ppx, prevent blood clots    valsartan 40 mg oral tablet  -- 1 tab(s) by mouth once a day  -- Indication: For home med    Bystolic 2.5 mg oral tablet  -- 1 tab(s) by mouth once a day  -- Indication: For home med    Advair Diskus 500 mcg-50 mcg inhalation powder  -- 1 puff(s) inhaled once a day  -- Indication: For home med    hydroCHLOROthiazide 12.5 mg oral capsule  -- 1 cap(s) by mouth once a day  -- Indication: For home med    famotidine 20 mg oral tablet  -- 1 tab(s) by mouth once a day  -- It is very important that you take or use this exactly as directed.  Do not skip doses or discontinue unless directed by your doctor.  Obtain medical advice before taking any non-prescription drugs as some may affect the action of this medication.    -- Indication: For Gi protection    montelukast 10 mg oral tablet  -- 1 tab(s) by mouth once a day  -- Indication: For home med    multivitamin  -- 1 cap(s) by mouth once a day  -- Indication: For home med

## 2017-07-26 NOTE — DISCHARGE NOTE ADULT - CARE PLAN
Principal Discharge DX:	Osteoarthritis of right hip, unspecified osteoarthritis type  Goal:	Return to ADLs  Instructions for follow-up, activity and diet:	Discharge Instructions Total Hip Arthroplasty    1. Diet: Resume previous diet    2. Activity: WBAT. Rolling walker. Posterior Hip Dislocation Precautions. Abduction Pillow while in bed and Chair. Daily Physical Therapy.    3. Call with: fever over 101, wound redness, drainage or open area, calf pain/calf swelling.    4. Wound Care: Remove old and Place new Aquacel bandage to hip wound every 7days. Remove Sutures/Staples Post Op Day #14 so long as wound is healed, no drainage or open area. OK to Shower with Aquacel.  Avoid direct water beating on bandage.     5. DVT PE Prophylaxis: see med rec for details/dosing.  **Aspirin  BID x 30days OR   INR Daily until levels stable.  -Continue Pepcid while on Anticoagulant (Aspirin, Coumadin, Lovenox).    6. Labs: Check H&H weekly while on Anticoagulation    7. Follow Up: Orthopedics, 10-14 days in office. Call to schedule. If going home, eRX sent to your pharmacy for . Principal Discharge DX:	Osteoarthritis of right hip, unspecified osteoarthritis type  Goal:	Return to ADLs  Instructions for follow-up, activity and diet:	Discharge Instructions Total Hip Arthroplasty    1. Diet: Resume previous diet    2. Activity: WBAT. Rolling walker. Posterior Hip Dislocation Precautions. Abduction Pillow while in bed and Chair. Daily Physical Therapy.    3. Call with: fever over 101, wound redness, drainage or open area, calf pain/calf swelling.    4. Wound Care: Remove old and Place new Aquacel bandage to hip wound every 7days. Remove Sutures/Staples Post Op Day #14 (8/8/17)so long as wound is healed, no drainage or open area. OK to Shower with Aquacel.  Avoid direct water beating on bandage.     5. DVT PE Prophylaxis: see med rec for details/dosing.  Aspirin  BID x 30days   -Continue Pepcid while on Anticoagulant (Aspirin).    6. Labs: Check H&H weekly while on Anticoagulation    7. Follow Up: Orthopedics, 10-14 days in office. Call to schedule. If going home, eRX sent to your pharmacy for .

## 2017-07-26 NOTE — DISCHARGE NOTE ADULT - HOSPITAL COURSE
The patient is a 62 year old Male status post elective total hip  Arthroplasty to the right knee after failing outpatient nonoperative conservative management.  Patient presented to [facility] after being medically cleared for an elective surgical procedure. The patient was taken to the operating room on date mentioned above. Prophylactic antibiotics were started before the procedure and continued for 24 hours.  There were no complications during the procedure and patient tolerated the procedure well.  The patient was transferred to recovery room in stable condition and subsequently to surgical floor.  Patient was placed on aspirin for anticoagulation.  All home medications were continued.  The patient received physical therapy daily and daily labs were followed.  The dressing was kept clean, dry, intact.  The rest of the hospital stay was unremarkable. H&P:  Pt is a 62y Male  PAST MEDICAL & SURGICAL HISTORY:  Nasal polyps: history of  Seasonal allergic rhinitis due to pollen  Uncomplicated asthma, unspecified asthma severity  Lumbar herniated disc  Osteoarthritis of right hip, unspecified osteoarthritis type  History of kidney stones  Diverticulosis of intestine without bleeding, unspecified intestinal tract location  MVP (mitral valve prolapse)  Essential hypertension  Tinnitus of both ears  H/O sinus surgery: x 3  H/O arthroscopy of shoulder: left 2/ 2016  H/O lithotripsy: 2015  H/O hernia repair: 11/3/2016   s/pTotal Hip Arthroplasty. Pt is afebrile with stable vital signs. Pain is controlled. Alert and Oriented. Exam reveals intact EHL FHL TA GS, +DP. Dressing is clean and dry with a New Aquacel bandage on.  Vital Signs Last 24 Hrs  T(C): 37.1 (07-27-17 @ 06:00), Max: 37.1 (07-27-17 @ 06:00)  T(F): 98.7 (07-27-17 @ 06:00), Max: 98.7 (07-27-17 @ 06:00)  HR: 84 (07-27-17 @ 06:00) (66 - 84)  BP: 132/70 (07-27-17 @ 06:00) (120/65 - 132/70)  BP(mean): --  RR: 16 (07-27-17 @ 06:00) (16 - 16)  SpO2: 95% (07-27-17 @ 06:00) (94% - 98%)                        11.9   5.9   )-----------( 179      ( 26 Jul 2017 05:47 )             35.8         Hospital Course:  Patient presented to [facility] after being medically cleared for an elective surgical procedure, having failed outpatient non-operative conservative management. Prophylactic antibiotics were started before the procedure and continued for 24 hours. They were admitted after surgery to the orthopedic floor.   There were no complications during the hospital stay. All home medications were continued.    Routine consults were obtained from the Anticoagulation Team for DVT/PE prophylaxis, from Physical Therapy for twice daily PT starting on POD 0, and from the Hospitalist for Medical Co-management. Patient was placed on ECASA 325 BID for anticoagulation.  Pertinent home medications were continued.  Daily labs were followed.      On POD 0 the pt was OOB with PT and there were no overnight events. POD1 the hemovac drain was removed. POD 2, PT was continued, and on POD 2 a new Aquacel dressing was applied. The pt is ready today for DC to home with home PT.  The orthopedic Attending is aware and agrees.

## 2017-07-26 NOTE — DISCHARGE NOTE ADULT - PATIENT PORTAL LINK FT
“You can access the [employer] Patient Portal, offered by [facility], by registering with the following website: [email or web address]”

## 2017-07-26 NOTE — PROGRESS NOTE ADULT - SUBJECTIVE AND OBJECTIVE BOX
Orthopedics     POD 1 Total Hip Arthroplasty  Pain is controlled. Pt feeling well. No nausea or vomiting. Has been OOB with PT.    Vital Signs Last 24 Hrs  T(C): 36.6 (07-26-17 @ 05:10), Max: 36.6 (07-25-17 @ 23:28)  T(F): 97.8 (07-26-17 @ 05:10), Max: 97.8 (07-25-17 @ 23:28)  HR: 65 (07-26-17 @ 05:10) (44 - 65)  BP: 101/54 (07-26-17 @ 05:10) (101/54 - 120/70)  BP(mean): --  RR: 16 (07-26-17 @ 05:10) (12 - 16)  SpO2: 100% (07-26-17 @ 05:10) (94% - 100%)                        11.9   5.9   )-----------( 179      ( 26 Jul 2017 05:47 )             35.8     26 Jul 2017 05:47    140    |  105    |  9      ----------------------------<  95     4.2     |  28     |  0.60     Ca    8.4        26 Jul 2017 05:47      PT/INR - ( 25 Jul 2017 07:17 )   PT: 10.5 sec;   INR: 0.97 ratio         PTT - ( 25 Jul 2017 07:17 )  PTT:33.8 sec    Exam:  NAD AAOx3  Dressing clean and dry  +EHL FHL TA GS  SILT toes 1-5  +DP  Calf Soft NT    A/P:  Stable POD 1 Right Total Hip Arthroplasty  -Analgesia  -DVT PE ppx  -OOB PT posterior dislocation precautions  -Hemovac drain removed uneventfully earlier by resident

## 2017-07-26 NOTE — PROGRESS NOTE ADULT - SUBJECTIVE AND OBJECTIVE BOX
Patient seen and examined. Pain controlled. No acute events overnight    Vital Signs Last 24 Hrs  T(C): 36.6 (07-26-17 @ 05:10), Max: 36.6 (07-25-17 @ 23:28)  T(F): 97.8 (07-26-17 @ 05:10), Max: 97.8 (07-25-17 @ 23:28)  HR: 65 (07-26-17 @ 05:10) (44 - 65)  BP: 101/54 (07-26-17 @ 05:10) (101/54 - 120/70)  BP(mean): --  RR: 16 (07-26-17 @ 05:10) (12 - 16)  SpO2: 100% (07-26-17 @ 05:10) (94% - 100%)    Physical Exam  Gen: NAD  RLE:   Dressing c/d/i  +EHL/FHL/Gsc/TA  SILT L3-S1  DP +  Compartments soft  No calf ttp    A/P: 62y Male sp R CLAUDIA POD 1  Pain control  DVT ppx  PT/OT, WBAT, Posterior Hip Precautions  FU labs  Dispo planning  D/w attending

## 2017-07-26 NOTE — DISCHARGE NOTE ADULT - PLAN OF CARE
Return to ADLs Discharge Instructions Total Hip Arthroplasty    1. Diet: Resume previous diet    2. Activity: WBAT. Rolling walker. Posterior Hip Dislocation Precautions. Abduction Pillow while in bed and Chair. Daily Physical Therapy.    3. Call with: fever over 101, wound redness, drainage or open area, calf pain/calf swelling.    4. Wound Care: Remove old and Place new Aquacel bandage to hip wound every 7days. Remove Sutures/Staples Post Op Day #14 so long as wound is healed, no drainage or open area. OK to Shower with Aquacel.  Avoid direct water beating on bandage.     5. DVT PE Prophylaxis: see med rec for details/dosing.  **Aspirin  BID x 30days OR   INR Daily until levels stable.  -Continue Pepcid while on Anticoagulant (Aspirin, Coumadin, Lovenox).    6. Labs: Check H&H weekly while on Anticoagulation    7. Follow Up: Orthopedics, 10-14 days in office. Call to schedule. If going home, eRX sent to your pharmacy for . Discharge Instructions Total Hip Arthroplasty    1. Diet: Resume previous diet    2. Activity: WBAT. Rolling walker. Posterior Hip Dislocation Precautions. Abduction Pillow while in bed and Chair. Daily Physical Therapy.    3. Call with: fever over 101, wound redness, drainage or open area, calf pain/calf swelling.    4. Wound Care: Remove old and Place new Aquacel bandage to hip wound every 7days. Remove Sutures/Staples Post Op Day #14 (8/8/17)so long as wound is healed, no drainage or open area. OK to Shower with Aquacel.  Avoid direct water beating on bandage.     5. DVT PE Prophylaxis: see med rec for details/dosing.  Aspirin  BID x 30days   -Continue Pepcid while on Anticoagulant (Aspirin).    6. Labs: Check H&H weekly while on Anticoagulation    7. Follow Up: Orthopedics, 10-14 days in office. Call to schedule. If going home, eRX sent to your pharmacy for .

## 2017-07-26 NOTE — DISCHARGE NOTE ADULT - CARE PROVIDER_API CALL
Husam Olmedo), Orthopaedic Surgery  [address] [place] [address]  Phone: [phone]  Fax: [phone]

## 2017-07-27 VITALS
RESPIRATION RATE: 16 BRPM | HEART RATE: 84 BPM | SYSTOLIC BLOOD PRESSURE: 132 MMHG | DIASTOLIC BLOOD PRESSURE: 70 MMHG | TEMPERATURE: 99 F | OXYGEN SATURATION: 95 %

## 2017-07-27 RX ORDER — ASPIRIN/CALCIUM CARB/MAGNESIUM 324 MG
1 TABLET ORAL
Qty: 60 | Refills: 0
Start: 2017-07-27 | End: 2017-08-26

## 2017-07-27 RX ORDER — OXYCODONE HYDROCHLORIDE 5 MG/1
1 TABLET ORAL
Qty: 0 | Refills: 0 | COMMUNITY
Start: 2017-07-27

## 2017-07-27 RX ORDER — FAMOTIDINE 10 MG/ML
1 INJECTION INTRAVENOUS
Qty: 30 | Refills: 0 | OUTPATIENT
Start: 2017-07-27 | End: 2017-08-26

## 2017-07-27 RX ORDER — FAMOTIDINE 10 MG/ML
1 INJECTION INTRAVENOUS
Qty: 60 | Refills: 0
Start: 2017-07-27 | End: 2017-08-26

## 2017-07-27 RX ORDER — OXYCODONE HYDROCHLORIDE 5 MG/1
1 TABLET ORAL
Qty: 0 | Refills: 0 | DISCHARGE
Start: 2017-07-27

## 2017-07-27 RX ADMIN — Medication 650 MILLIGRAM(S): at 06:03

## 2017-07-27 RX ADMIN — FLUTICASONE PROPIONATE AND SALMETEROL 1 DOSE(S): 50; 250 POWDER ORAL; RESPIRATORY (INHALATION) at 08:10

## 2017-07-27 RX ADMIN — OXYCODONE HYDROCHLORIDE 10 MILLIGRAM(S): 5 TABLET ORAL at 06:03

## 2017-07-27 RX ADMIN — CELECOXIB 200 MILLIGRAM(S): 200 CAPSULE ORAL at 08:44

## 2017-07-27 RX ADMIN — OXYCODONE HYDROCHLORIDE 10 MILLIGRAM(S): 5 TABLET ORAL at 04:09

## 2017-07-27 RX ADMIN — VALSARTAN 40 MILLIGRAM(S): 80 TABLET ORAL at 06:03

## 2017-07-27 RX ADMIN — OXYCODONE HYDROCHLORIDE 10 MILLIGRAM(S): 5 TABLET ORAL at 03:12

## 2017-07-27 RX ADMIN — OXYCODONE HYDROCHLORIDE 10 MILLIGRAM(S): 5 TABLET ORAL at 06:20

## 2017-07-27 RX ADMIN — Medication 325 MILLIGRAM(S): at 06:03

## 2017-07-27 RX ADMIN — SODIUM CHLORIDE 3 MILLILITER(S): 9 INJECTION INTRAMUSCULAR; INTRAVENOUS; SUBCUTANEOUS at 05:56

## 2017-07-27 RX ADMIN — NEBIVOLOL HYDROCHLORIDE 2.5 MILLIGRAM(S): 5 TABLET ORAL at 06:03

## 2017-07-27 NOTE — PROGRESS NOTE ADULT - SUBJECTIVE AND OBJECTIVE BOX
Patient seen and examined. Pain controlled. No acute events overnight    Vital Signs Last 24 Hrs  T(C): 37.1 (07-27-17 @ 06:00), Max: 37.1 (07-27-17 @ 06:00)  T(F): 98.7 (07-27-17 @ 06:00), Max: 98.7 (07-27-17 @ 06:00)  HR: 84 (07-27-17 @ 06:00) (66 - 84)  BP: 132/70 (07-27-17 @ 06:00) (120/65 - 132/70)  BP(mean): --  RR: 16 (07-27-17 @ 06:00) (16 - 16)  SpO2: 95% (07-27-17 @ 06:00) (94% - 98%)    Physical Exam  Gen: NAD  RLE:   Dressing c/d/i  +EHL/FHL/Gsc/TA  SILT L3-S1  DP +  Compartments soft  No calf ttp    A/P: 62y Male sp R TKA POD 2  Pain control  DVT ppx  PT/OT, WBAT RLE  FU labs  DC home today  D/w attending

## 2017-07-31 ENCOUNTER — RX RENEWAL (OUTPATIENT)
Age: 62
End: 2017-07-31

## 2017-07-31 DIAGNOSIS — M16.11 UNILATERAL PRIMARY OSTEOARTHRITIS, RIGHT HIP: ICD-10-CM

## 2017-07-31 DIAGNOSIS — K76.0 FATTY (CHANGE OF) LIVER, NOT ELSEWHERE CLASSIFIED: ICD-10-CM

## 2017-07-31 DIAGNOSIS — E78.5 HYPERLIPIDEMIA, UNSPECIFIED: ICD-10-CM

## 2017-07-31 DIAGNOSIS — I10 ESSENTIAL (PRIMARY) HYPERTENSION: ICD-10-CM

## 2017-07-31 DIAGNOSIS — J32.9 CHRONIC SINUSITIS, UNSPECIFIED: ICD-10-CM

## 2017-07-31 DIAGNOSIS — I34.0 NONRHEUMATIC MITRAL (VALVE) INSUFFICIENCY: ICD-10-CM

## 2017-07-31 DIAGNOSIS — J45.909 UNSPECIFIED ASTHMA, UNCOMPLICATED: ICD-10-CM

## 2017-08-30 ENCOUNTER — RX RENEWAL (OUTPATIENT)
Age: 62
End: 2017-08-30

## 2017-08-31 ENCOUNTER — APPOINTMENT (OUTPATIENT)
Dept: PULMONOLOGY | Facility: CLINIC | Age: 62
End: 2017-08-31
Payer: COMMERCIAL

## 2017-08-31 VITALS
BODY MASS INDEX: 23.91 KG/M2 | DIASTOLIC BLOOD PRESSURE: 70 MMHG | HEIGHT: 70.5 IN | HEART RATE: 82 BPM | OXYGEN SATURATION: 91 % | SYSTOLIC BLOOD PRESSURE: 120 MMHG

## 2017-08-31 VITALS — BODY MASS INDEX: 23.91 KG/M2 | WEIGHT: 169 LBS

## 2017-08-31 VITALS — RESPIRATION RATE: 16 BRPM

## 2017-08-31 PROCEDURE — 99214 OFFICE O/P EST MOD 30 MIN: CPT | Mod: 25

## 2017-08-31 PROCEDURE — 94060 EVALUATION OF WHEEZING: CPT

## 2017-08-31 PROCEDURE — 94664 DEMO&/EVAL PT USE INHALER: CPT | Mod: 59

## 2017-09-05 ENCOUNTER — APPOINTMENT (OUTPATIENT)
Dept: PULMONOLOGY | Facility: CLINIC | Age: 62
End: 2017-09-05
Payer: COMMERCIAL

## 2017-09-05 VITALS — SYSTOLIC BLOOD PRESSURE: 120 MMHG | WEIGHT: 169 LBS | DIASTOLIC BLOOD PRESSURE: 76 MMHG | BODY MASS INDEX: 23.91 KG/M2

## 2017-09-05 PROCEDURE — 96372 THER/PROPH/DIAG INJ SC/IM: CPT

## 2017-09-05 RX ORDER — MEPOLIZUMAB 100 MG/ML
100 INJECTION, POWDER, FOR SOLUTION SUBCUTANEOUS
Refills: 0 | Status: COMPLETED | OUTPATIENT
Start: 2017-09-05

## 2017-09-05 RX ADMIN — MEPOLIZUMAB 1 MG: 100 INJECTION, POWDER, FOR SOLUTION SUBCUTANEOUS at 00:00

## 2017-09-26 ENCOUNTER — TRANSCRIPTION ENCOUNTER (OUTPATIENT)
Age: 62
End: 2017-09-26

## 2017-09-29 ENCOUNTER — APPOINTMENT (OUTPATIENT)
Dept: PULMONOLOGY | Facility: CLINIC | Age: 62
End: 2017-09-29
Payer: COMMERCIAL

## 2017-09-29 VITALS
HEIGHT: 65 IN | OXYGEN SATURATION: 96 % | HEART RATE: 84 BPM | TEMPERATURE: 98.4 F | BODY MASS INDEX: 28.16 KG/M2 | WEIGHT: 169 LBS | DIASTOLIC BLOOD PRESSURE: 80 MMHG | SYSTOLIC BLOOD PRESSURE: 138 MMHG | RESPIRATION RATE: 16 BRPM

## 2017-09-29 PROCEDURE — 94664 DEMO&/EVAL PT USE INHALER: CPT

## 2017-09-29 PROCEDURE — 99215 OFFICE O/P EST HI 40 MIN: CPT | Mod: 25

## 2017-10-05 ENCOUNTER — APPOINTMENT (OUTPATIENT)
Dept: PULMONOLOGY | Facility: CLINIC | Age: 62
End: 2017-10-05
Payer: COMMERCIAL

## 2017-10-05 VITALS
DIASTOLIC BLOOD PRESSURE: 60 MMHG | WEIGHT: 169 LBS | BODY MASS INDEX: 28.16 KG/M2 | TEMPERATURE: 98.1 F | SYSTOLIC BLOOD PRESSURE: 158 MMHG | HEIGHT: 65 IN | HEART RATE: 76 BPM | OXYGEN SATURATION: 95 % | RESPIRATION RATE: 16 BRPM

## 2017-10-05 DIAGNOSIS — Z87.891 PERSONAL HISTORY OF NICOTINE DEPENDENCE: ICD-10-CM

## 2017-10-05 PROCEDURE — 99215 OFFICE O/P EST HI 40 MIN: CPT

## 2017-10-11 ENCOUNTER — APPOINTMENT (OUTPATIENT)
Dept: PULMONOLOGY | Facility: CLINIC | Age: 62
End: 2017-10-11
Payer: COMMERCIAL

## 2017-10-11 VITALS — SYSTOLIC BLOOD PRESSURE: 160 MMHG | BODY MASS INDEX: 29.95 KG/M2 | WEIGHT: 180 LBS | DIASTOLIC BLOOD PRESSURE: 90 MMHG

## 2017-10-11 PROCEDURE — 96372 THER/PROPH/DIAG INJ SC/IM: CPT

## 2017-10-11 RX ORDER — MEPOLIZUMAB 100 MG/ML
100 INJECTION, POWDER, FOR SOLUTION SUBCUTANEOUS
Refills: 0 | Status: COMPLETED | OUTPATIENT
Start: 2017-10-11

## 2017-10-11 RX ADMIN — MEPOLIZUMAB 1 MG: 100 INJECTION, POWDER, FOR SOLUTION SUBCUTANEOUS at 00:00

## 2017-10-14 ENCOUNTER — TRANSCRIPTION ENCOUNTER (OUTPATIENT)
Age: 62
End: 2017-10-14

## 2017-11-02 ENCOUNTER — OUTPATIENT (OUTPATIENT)
Dept: OUTPATIENT SERVICES | Facility: HOSPITAL | Age: 62
LOS: 1 days | Discharge: HOME | End: 2017-11-02
Payer: COMMERCIAL

## 2017-11-02 VITALS
SYSTOLIC BLOOD PRESSURE: 136 MMHG | DIASTOLIC BLOOD PRESSURE: 89 MMHG | RESPIRATION RATE: 20 BRPM | OXYGEN SATURATION: 98 % | WEIGHT: 177.03 LBS | HEIGHT: 72 IN | TEMPERATURE: 98 F | HEART RATE: 82 BPM

## 2017-11-02 DIAGNOSIS — Z98.890 OTHER SPECIFIED POSTPROCEDURAL STATES: Chronic | ICD-10-CM

## 2017-11-02 DIAGNOSIS — M16.12 UNILATERAL PRIMARY OSTEOARTHRITIS, LEFT HIP: ICD-10-CM

## 2017-11-02 DIAGNOSIS — Z96.641 PRESENCE OF RIGHT ARTIFICIAL HIP JOINT: Chronic | ICD-10-CM

## 2017-11-02 LAB
ANION GAP SERPL CALC-SCNC: 8 MMOL/L — SIGNIFICANT CHANGE UP (ref 5–17)
APPEARANCE UR: CLEAR — SIGNIFICANT CHANGE UP
BASOPHILS # BLD AUTO: 0.1 K/UL — SIGNIFICANT CHANGE UP (ref 0–0.2)
BASOPHILS NFR BLD AUTO: 1.2 % — SIGNIFICANT CHANGE UP (ref 0–2)
BILIRUB UR-MCNC: NEGATIVE — SIGNIFICANT CHANGE UP
BLD GP AB SCN SERPL QL: SIGNIFICANT CHANGE UP
BUN SERPL-MCNC: 23 MG/DL — SIGNIFICANT CHANGE UP (ref 7–23)
CALCIUM SERPL-MCNC: 9.7 MG/DL — SIGNIFICANT CHANGE UP (ref 8.5–10.1)
CHLORIDE SERPL-SCNC: 103 MMOL/L — SIGNIFICANT CHANGE UP (ref 96–108)
CO2 SERPL-SCNC: 27 MMOL/L — SIGNIFICANT CHANGE UP (ref 22–31)
COLOR SPEC: YELLOW — SIGNIFICANT CHANGE UP
CREAT SERPL-MCNC: 0.92 MG/DL — SIGNIFICANT CHANGE UP (ref 0.5–1.3)
DIFF PNL FLD: (no result)
EOSINOPHIL # BLD AUTO: 0 K/UL — SIGNIFICANT CHANGE UP (ref 0–0.5)
EOSINOPHIL NFR BLD AUTO: 0.5 % — SIGNIFICANT CHANGE UP (ref 0–6)
GLUCOSE SERPL-MCNC: 71 MG/DL — SIGNIFICANT CHANGE UP (ref 70–99)
GLUCOSE UR QL: NEGATIVE MG/DL — SIGNIFICANT CHANGE UP
HCT VFR BLD CALC: 44.6 % — SIGNIFICANT CHANGE UP (ref 39–50)
HGB BLD-MCNC: 14.7 G/DL — SIGNIFICANT CHANGE UP (ref 13–17)
KETONES UR-MCNC: NEGATIVE — SIGNIFICANT CHANGE UP
LEUKOCYTE ESTERASE UR-ACNC: (no result)
LYMPHOCYTES # BLD AUTO: 0.9 K/UL — LOW (ref 1–3.3)
LYMPHOCYTES # BLD AUTO: 16 % — SIGNIFICANT CHANGE UP (ref 13–44)
MCHC RBC-ENTMCNC: 30.2 PG — SIGNIFICANT CHANGE UP (ref 27–34)
MCHC RBC-ENTMCNC: 32.9 GM/DL — SIGNIFICANT CHANGE UP (ref 32–36)
MCV RBC AUTO: 91.8 FL — SIGNIFICANT CHANGE UP (ref 80–100)
MONOCYTES # BLD AUTO: 0.8 K/UL — SIGNIFICANT CHANGE UP (ref 0–0.9)
MONOCYTES NFR BLD AUTO: 14.1 % — HIGH (ref 2–14)
MRSA PCR RESULT.: SIGNIFICANT CHANGE UP
NEUTROPHILS # BLD AUTO: 3.9 K/UL — SIGNIFICANT CHANGE UP (ref 1.8–7.4)
NEUTROPHILS NFR BLD AUTO: 68.1 % — SIGNIFICANT CHANGE UP (ref 43–77)
NITRITE UR-MCNC: NEGATIVE — SIGNIFICANT CHANGE UP
PH UR: 6 — SIGNIFICANT CHANGE UP (ref 5–8)
PLATELET # BLD AUTO: 228 K/UL — SIGNIFICANT CHANGE UP (ref 150–400)
POTASSIUM SERPL-MCNC: 4.1 MMOL/L — SIGNIFICANT CHANGE UP (ref 3.5–5.3)
POTASSIUM SERPL-SCNC: 4.1 MMOL/L — SIGNIFICANT CHANGE UP (ref 3.5–5.3)
PROT UR-MCNC: NEGATIVE MG/DL — SIGNIFICANT CHANGE UP
RBC # BLD: 4.86 M/UL — SIGNIFICANT CHANGE UP (ref 4.2–5.8)
RBC # FLD: 14 % — SIGNIFICANT CHANGE UP (ref 10.3–14.5)
RBC CASTS # UR COMP ASSIST: SIGNIFICANT CHANGE UP /HPF (ref 0–4)
S AUREUS DNA NOSE QL NAA+PROBE: SIGNIFICANT CHANGE UP
SODIUM SERPL-SCNC: 138 MMOL/L — SIGNIFICANT CHANGE UP (ref 135–145)
SP GR SPEC: 1.01 — SIGNIFICANT CHANGE UP (ref 1.01–1.02)
TYPE + AB SCN PNL BLD: SIGNIFICANT CHANGE UP
UROBILINOGEN FLD QL: NEGATIVE MG/DL — SIGNIFICANT CHANGE UP
WBC # BLD: 5.7 K/UL — SIGNIFICANT CHANGE UP (ref 3.8–10.5)
WBC # FLD AUTO: 5.7 K/UL — SIGNIFICANT CHANGE UP (ref 3.8–10.5)
WBC UR QL: SIGNIFICANT CHANGE UP

## 2017-11-02 PROCEDURE — 73502 X-RAY EXAM HIP UNI 2-3 VIEWS: CPT | Mod: 26

## 2017-11-02 RX ORDER — VALSARTAN 80 MG/1
1 TABLET ORAL
Qty: 0 | Refills: 0 | COMMUNITY

## 2017-11-02 NOTE — H&P PST ADULT - ASSESSMENT
61 y/o male with left hip osteoarthritis. Complain of chronic left hip pain. Takes oxycodone with temporary pain relief. Pt has difficulty walking distances due to left hip pain. Scheduled for left THR with Dr. Shara GUARDADO.    Plan  1. Stop all NSAIDS, herbal supplements and vitamins for 7 days.  2. NPO at midnight.  3. Take the following medications (bystolic, use advair) with small sips of water on the morning of your procedure/surgery.  4. Use EZ sponges as directed  5. Use mupirocin as directed

## 2017-11-02 NOTE — H&P PST ADULT - HISTORY OF PRESENT ILLNESS
61 y/o male with left hip osteoarthritis. Complain of chronic left hip pain. Takes oxycodone with temporary pain relief. Pt has difficulty walking distances due to left hip pain. Here today for PST for left THR.

## 2017-11-02 NOTE — H&P PST ADULT - PSH
H/O arthroscopy of shoulder  left 2/ 2016  H/O hernia repair  11/3/2016  H/O lithotripsy  2015  H/O sinus surgery  x 3  History of hip replacement, total, right  07/2017

## 2017-11-02 NOTE — H&P PST ADULT - PMH
Asthma    Diverticulosis of intestine without bleeding, unspecified intestinal tract location    Essential hypertension    History of kidney stones    Lumbar herniated disc    MVP (mitral valve prolapse)    Nasal polyps  history of  Osteoarthritis  left hip  Osteoarthritis of right hip, unspecified osteoarthritis type    Seasonal allergic rhinitis due to pollen    Tinnitus of both ears

## 2017-11-13 RX ORDER — OXYCODONE HYDROCHLORIDE 5 MG/1
10 TABLET ORAL EVERY 4 HOURS
Qty: 0 | Refills: 0 | Status: DISCONTINUED | OUTPATIENT
Start: 2017-11-14 | End: 2017-11-15

## 2017-11-13 RX ORDER — OXYCODONE HYDROCHLORIDE 5 MG/1
10 TABLET ORAL ONCE
Qty: 0 | Refills: 0 | Status: DISCONTINUED | OUTPATIENT
Start: 2017-11-14 | End: 2017-11-14

## 2017-11-13 RX ORDER — ACETAMINOPHEN 500 MG
650 TABLET ORAL EVERY 6 HOURS
Qty: 0 | Refills: 0 | Status: DISCONTINUED | OUTPATIENT
Start: 2017-11-14 | End: 2017-11-15

## 2017-11-13 RX ORDER — PANTOPRAZOLE SODIUM 20 MG/1
40 TABLET, DELAYED RELEASE ORAL ONCE
Qty: 0 | Refills: 0 | Status: COMPLETED | OUTPATIENT
Start: 2017-11-14 | End: 2017-11-14

## 2017-11-13 RX ORDER — ACETAMINOPHEN 500 MG
650 TABLET ORAL ONCE
Qty: 0 | Refills: 0 | Status: COMPLETED | OUTPATIENT
Start: 2017-11-14 | End: 2017-11-14

## 2017-11-13 RX ORDER — ONDANSETRON 8 MG/1
4 TABLET, FILM COATED ORAL EVERY 6 HOURS
Qty: 0 | Refills: 0 | Status: DISCONTINUED | OUTPATIENT
Start: 2017-11-14 | End: 2017-11-15

## 2017-11-13 RX ORDER — CELECOXIB 200 MG/1
200 CAPSULE ORAL
Qty: 0 | Refills: 0 | Status: DISCONTINUED | OUTPATIENT
Start: 2017-11-14 | End: 2017-11-15

## 2017-11-13 RX ORDER — HYDROMORPHONE HYDROCHLORIDE 2 MG/ML
0.5 INJECTION INTRAMUSCULAR; INTRAVENOUS; SUBCUTANEOUS
Qty: 0 | Refills: 0 | Status: DISCONTINUED | OUTPATIENT
Start: 2017-11-14 | End: 2017-11-15

## 2017-11-13 RX ORDER — OXYCODONE HYDROCHLORIDE 5 MG/1
10 TABLET ORAL EVERY 12 HOURS
Qty: 0 | Refills: 0 | Status: DISCONTINUED | OUTPATIENT
Start: 2017-11-14 | End: 2017-11-15

## 2017-11-13 RX ORDER — SENNA PLUS 8.6 MG/1
2 TABLET ORAL AT BEDTIME
Qty: 0 | Refills: 0 | Status: DISCONTINUED | OUTPATIENT
Start: 2017-11-14 | End: 2017-11-15

## 2017-11-13 RX ORDER — FENTANYL CITRATE 50 UG/ML
50 INJECTION INTRAVENOUS
Qty: 0 | Refills: 0 | Status: DISCONTINUED | OUTPATIENT
Start: 2017-11-14 | End: 2017-11-14

## 2017-11-13 RX ORDER — OXYCODONE HYDROCHLORIDE 5 MG/1
5 TABLET ORAL EVERY 4 HOURS
Qty: 0 | Refills: 0 | Status: DISCONTINUED | OUTPATIENT
Start: 2017-11-14 | End: 2017-11-15

## 2017-11-13 RX ORDER — FAMOTIDINE 10 MG/ML
20 INJECTION INTRAVENOUS ONCE
Qty: 0 | Refills: 0 | Status: COMPLETED | OUTPATIENT
Start: 2017-11-14 | End: 2017-11-14

## 2017-11-13 RX ORDER — SODIUM CHLORIDE 9 MG/ML
3 INJECTION INTRAMUSCULAR; INTRAVENOUS; SUBCUTANEOUS EVERY 8 HOURS
Qty: 0 | Refills: 0 | Status: DISCONTINUED | OUTPATIENT
Start: 2017-11-14 | End: 2017-11-15

## 2017-11-14 ENCOUNTER — RESULT REVIEW (OUTPATIENT)
Age: 62
End: 2017-11-14

## 2017-11-14 ENCOUNTER — INPATIENT (INPATIENT)
Facility: HOSPITAL | Age: 62
LOS: 0 days | Discharge: HOME HEALTH | End: 2017-11-15
Attending: ORTHOPAEDIC SURGERY | Admitting: ORTHOPAEDIC SURGERY
Payer: COMMERCIAL

## 2017-11-14 ENCOUNTER — TRANSCRIPTION ENCOUNTER (OUTPATIENT)
Age: 62
End: 2017-11-14

## 2017-11-14 VITALS
HEART RATE: 66 BPM | DIASTOLIC BLOOD PRESSURE: 93 MMHG | TEMPERATURE: 98 F | SYSTOLIC BLOOD PRESSURE: 131 MMHG | RESPIRATION RATE: 16 BRPM | WEIGHT: 177.03 LBS | OXYGEN SATURATION: 98 %

## 2017-11-14 DIAGNOSIS — Z96.641 PRESENCE OF RIGHT ARTIFICIAL HIP JOINT: Chronic | ICD-10-CM

## 2017-11-14 DIAGNOSIS — Z98.890 OTHER SPECIFIED POSTPROCEDURAL STATES: Chronic | ICD-10-CM

## 2017-11-14 LAB
ANION GAP SERPL CALC-SCNC: 7 MMOL/L — SIGNIFICANT CHANGE UP (ref 5–17)
BUN SERPL-MCNC: 14 MG/DL — SIGNIFICANT CHANGE UP (ref 7–23)
CALCIUM SERPL-MCNC: 8.6 MG/DL — SIGNIFICANT CHANGE UP (ref 8.5–10.1)
CHLORIDE SERPL-SCNC: 107 MMOL/L — SIGNIFICANT CHANGE UP (ref 96–108)
CO2 SERPL-SCNC: 28 MMOL/L — SIGNIFICANT CHANGE UP (ref 22–31)
CREAT SERPL-MCNC: 0.79 MG/DL — SIGNIFICANT CHANGE UP (ref 0.5–1.3)
GLUCOSE SERPL-MCNC: 98 MG/DL — SIGNIFICANT CHANGE UP (ref 70–99)
HCT VFR BLD CALC: 37 % — LOW (ref 39–50)
HGB BLD-MCNC: 12.5 G/DL — LOW (ref 13–17)
INR BLD: 1.02 RATIO — SIGNIFICANT CHANGE UP (ref 0.88–1.16)
MCHC RBC-ENTMCNC: 31.2 PG — SIGNIFICANT CHANGE UP (ref 27–34)
MCHC RBC-ENTMCNC: 33.8 GM/DL — SIGNIFICANT CHANGE UP (ref 32–36)
MCV RBC AUTO: 92.5 FL — SIGNIFICANT CHANGE UP (ref 80–100)
PLATELET # BLD AUTO: 203 K/UL — SIGNIFICANT CHANGE UP (ref 150–400)
POTASSIUM SERPL-MCNC: 4.5 MMOL/L — SIGNIFICANT CHANGE UP (ref 3.5–5.3)
POTASSIUM SERPL-SCNC: 4.5 MMOL/L — SIGNIFICANT CHANGE UP (ref 3.5–5.3)
PROTHROM AB SERPL-ACNC: 11 SEC — SIGNIFICANT CHANGE UP (ref 9.8–12.7)
RBC # BLD: 4 M/UL — LOW (ref 4.2–5.8)
RBC # FLD: 13.7 % — SIGNIFICANT CHANGE UP (ref 10.3–14.5)
SODIUM SERPL-SCNC: 142 MMOL/L — SIGNIFICANT CHANGE UP (ref 135–145)
WBC # BLD: 9.2 K/UL — SIGNIFICANT CHANGE UP (ref 3.8–10.5)
WBC # FLD AUTO: 9.2 K/UL — SIGNIFICANT CHANGE UP (ref 3.8–10.5)

## 2017-11-14 PROCEDURE — 73501 X-RAY EXAM HIP UNI 1 VIEW: CPT | Mod: 26

## 2017-11-14 PROCEDURE — 88305 TISSUE EXAM BY PATHOLOGIST: CPT | Mod: 26

## 2017-11-14 RX ORDER — SODIUM CHLORIDE 9 MG/ML
1000 INJECTION, SOLUTION INTRAVENOUS
Qty: 0 | Refills: 0 | Status: DISCONTINUED | OUTPATIENT
Start: 2017-11-14 | End: 2017-11-14

## 2017-11-14 RX ORDER — HYDROMORPHONE HYDROCHLORIDE 2 MG/ML
0.5 INJECTION INTRAMUSCULAR; INTRAVENOUS; SUBCUTANEOUS
Qty: 0 | Refills: 0 | Status: DISCONTINUED | OUTPATIENT
Start: 2017-11-14 | End: 2017-11-14

## 2017-11-14 RX ORDER — OXYCODONE HYDROCHLORIDE 5 MG/1
5 TABLET ORAL EVERY 4 HOURS
Qty: 0 | Refills: 0 | Status: DISCONTINUED | OUTPATIENT
Start: 2017-11-14 | End: 2017-11-14

## 2017-11-14 RX ORDER — POLYETHYLENE GLYCOL 3350 17 G/17G
17 POWDER, FOR SOLUTION ORAL DAILY
Qty: 0 | Refills: 0 | Status: DISCONTINUED | OUTPATIENT
Start: 2017-11-14 | End: 2017-11-15

## 2017-11-14 RX ORDER — NEBIVOLOL HYDROCHLORIDE 5 MG/1
2.5 TABLET ORAL DAILY
Qty: 0 | Refills: 0 | Status: DISCONTINUED | OUTPATIENT
Start: 2017-11-14 | End: 2017-11-15

## 2017-11-14 RX ORDER — SENNA PLUS 8.6 MG/1
2 TABLET ORAL AT BEDTIME
Qty: 0 | Refills: 0 | Status: DISCONTINUED | OUTPATIENT
Start: 2017-11-14 | End: 2017-11-15

## 2017-11-14 RX ORDER — CELECOXIB 200 MG/1
200 CAPSULE ORAL ONCE
Qty: 0 | Refills: 0 | Status: COMPLETED | OUTPATIENT
Start: 2017-11-14 | End: 2017-11-14

## 2017-11-14 RX ORDER — BUDESONIDE AND FORMOTEROL FUMARATE DIHYDRATE 160; 4.5 UG/1; UG/1
2 AEROSOL RESPIRATORY (INHALATION)
Qty: 0 | Refills: 0 | Status: DISCONTINUED | OUTPATIENT
Start: 2017-11-14 | End: 2017-11-15

## 2017-11-14 RX ORDER — BENZOCAINE AND MENTHOL 5; 1 G/100ML; G/100ML
1 LIQUID ORAL
Qty: 0 | Refills: 0 | Status: DISCONTINUED | OUTPATIENT
Start: 2017-11-14 | End: 2017-11-15

## 2017-11-14 RX ORDER — ACETAMINOPHEN 500 MG
650 TABLET ORAL EVERY 6 HOURS
Qty: 0 | Refills: 0 | Status: DISCONTINUED | OUTPATIENT
Start: 2017-11-14 | End: 2017-11-15

## 2017-11-14 RX ORDER — ONDANSETRON 8 MG/1
4 TABLET, FILM COATED ORAL ONCE
Qty: 0 | Refills: 0 | Status: DISCONTINUED | OUTPATIENT
Start: 2017-11-14 | End: 2017-11-14

## 2017-11-14 RX ORDER — DIPHENHYDRAMINE HCL 50 MG
25 CAPSULE ORAL EVERY 4 HOURS
Qty: 0 | Refills: 0 | Status: DISCONTINUED | OUTPATIENT
Start: 2017-11-14 | End: 2017-11-15

## 2017-11-14 RX ORDER — ONDANSETRON 8 MG/1
4 TABLET, FILM COATED ORAL EVERY 6 HOURS
Qty: 0 | Refills: 0 | Status: DISCONTINUED | OUTPATIENT
Start: 2017-11-14 | End: 2017-11-14

## 2017-11-14 RX ORDER — ASCORBIC ACID 60 MG
500 TABLET,CHEWABLE ORAL
Qty: 0 | Refills: 0 | Status: DISCONTINUED | OUTPATIENT
Start: 2017-11-14 | End: 2017-11-15

## 2017-11-14 RX ORDER — FERROUS SULFATE 325(65) MG
325 TABLET ORAL
Qty: 0 | Refills: 0 | Status: DISCONTINUED | OUTPATIENT
Start: 2017-11-14 | End: 2017-11-15

## 2017-11-14 RX ORDER — DOCUSATE SODIUM 100 MG
100 CAPSULE ORAL THREE TIMES A DAY
Qty: 0 | Refills: 0 | Status: DISCONTINUED | OUTPATIENT
Start: 2017-11-14 | End: 2017-11-15

## 2017-11-14 RX ORDER — FOLIC ACID 0.8 MG
1 TABLET ORAL DAILY
Qty: 0 | Refills: 0 | Status: DISCONTINUED | OUTPATIENT
Start: 2017-11-14 | End: 2017-11-15

## 2017-11-14 RX ORDER — PANTOPRAZOLE SODIUM 20 MG/1
40 TABLET, DELAYED RELEASE ORAL DAILY
Qty: 0 | Refills: 0 | Status: DISCONTINUED | OUTPATIENT
Start: 2017-11-14 | End: 2017-11-15

## 2017-11-14 RX ORDER — MONTELUKAST 4 MG/1
10 TABLET, CHEWABLE ORAL AT BEDTIME
Qty: 0 | Refills: 0 | Status: DISCONTINUED | OUTPATIENT
Start: 2017-11-14 | End: 2017-11-15

## 2017-11-14 RX ORDER — ZOLPIDEM TARTRATE 10 MG/1
5 TABLET ORAL AT BEDTIME
Qty: 0 | Refills: 0 | Status: DISCONTINUED | OUTPATIENT
Start: 2017-11-14 | End: 2017-11-15

## 2017-11-14 RX ORDER — DIPHENHYDRAMINE HCL 50 MG
25 CAPSULE ORAL AT BEDTIME
Qty: 0 | Refills: 0 | Status: DISCONTINUED | OUTPATIENT
Start: 2017-11-14 | End: 2017-11-15

## 2017-11-14 RX ORDER — SODIUM CHLORIDE 9 MG/ML
1000 INJECTION INTRAMUSCULAR; INTRAVENOUS; SUBCUTANEOUS
Qty: 0 | Refills: 0 | Status: DISCONTINUED | OUTPATIENT
Start: 2017-11-14 | End: 2017-11-15

## 2017-11-14 RX ORDER — CEFAZOLIN SODIUM 1 G
2000 VIAL (EA) INJECTION EVERY 8 HOURS
Qty: 0 | Refills: 0 | Status: COMPLETED | OUTPATIENT
Start: 2017-11-14 | End: 2017-11-15

## 2017-11-14 RX ORDER — DOCUSATE SODIUM 100 MG
100 CAPSULE ORAL EVERY 12 HOURS
Qty: 0 | Refills: 0 | Status: DISCONTINUED | OUTPATIENT
Start: 2017-11-14 | End: 2017-11-14

## 2017-11-14 RX ORDER — HYDROCHLOROTHIAZIDE 25 MG
12.5 TABLET ORAL DAILY
Qty: 0 | Refills: 0 | Status: DISCONTINUED | OUTPATIENT
Start: 2017-11-14 | End: 2017-11-15

## 2017-11-14 RX ORDER — ENOXAPARIN SODIUM 100 MG/ML
40 INJECTION SUBCUTANEOUS EVERY 24 HOURS
Qty: 0 | Refills: 0 | Status: DISCONTINUED | OUTPATIENT
Start: 2017-11-15 | End: 2017-11-15

## 2017-11-14 RX ORDER — HYDROMORPHONE HYDROCHLORIDE 2 MG/ML
0.5 INJECTION INTRAMUSCULAR; INTRAVENOUS; SUBCUTANEOUS ONCE
Qty: 0 | Refills: 0 | Status: DISCONTINUED | OUTPATIENT
Start: 2017-11-14 | End: 2017-11-14

## 2017-11-14 RX ORDER — OXYCODONE HYDROCHLORIDE 5 MG/1
10 TABLET ORAL EVERY 4 HOURS
Qty: 0 | Refills: 0 | Status: DISCONTINUED | OUTPATIENT
Start: 2017-11-14 | End: 2017-11-14

## 2017-11-14 RX ADMIN — Medication 500 MILLIGRAM(S): at 15:48

## 2017-11-14 RX ADMIN — Medication 650 MILLIGRAM(S): at 11:16

## 2017-11-14 RX ADMIN — FAMOTIDINE 20 MILLIGRAM(S): 10 INJECTION INTRAVENOUS at 11:16

## 2017-11-14 RX ADMIN — HYDROMORPHONE HYDROCHLORIDE 0.5 MILLIGRAM(S): 2 INJECTION INTRAMUSCULAR; INTRAVENOUS; SUBCUTANEOUS at 16:05

## 2017-11-14 RX ADMIN — SODIUM CHLORIDE 75 MILLILITER(S): 9 INJECTION, SOLUTION INTRAVENOUS at 15:58

## 2017-11-14 RX ADMIN — ZOLPIDEM TARTRATE 5 MILLIGRAM(S): 10 TABLET ORAL at 21:57

## 2017-11-14 RX ADMIN — Medication 100 MILLIGRAM(S): at 20:13

## 2017-11-14 RX ADMIN — Medication 1 TABLET(S): at 15:48

## 2017-11-14 RX ADMIN — OXYCODONE HYDROCHLORIDE 10 MILLIGRAM(S): 5 TABLET ORAL at 19:10

## 2017-11-14 RX ADMIN — OXYCODONE HYDROCHLORIDE 10 MILLIGRAM(S): 5 TABLET ORAL at 22:00

## 2017-11-14 RX ADMIN — MONTELUKAST 10 MILLIGRAM(S): 4 TABLET, CHEWABLE ORAL at 21:59

## 2017-11-14 RX ADMIN — Medication 650 MILLIGRAM(S): at 18:14

## 2017-11-14 RX ADMIN — CELECOXIB 200 MILLIGRAM(S): 200 CAPSULE ORAL at 11:15

## 2017-11-14 RX ADMIN — OXYCODONE HYDROCHLORIDE 10 MILLIGRAM(S): 5 TABLET ORAL at 11:15

## 2017-11-14 RX ADMIN — FENTANYL CITRATE 50 MICROGRAM(S): 50 INJECTION INTRAVENOUS at 13:44

## 2017-11-14 RX ADMIN — PANTOPRAZOLE SODIUM 40 MILLIGRAM(S): 20 TABLET, DELAYED RELEASE ORAL at 11:15

## 2017-11-14 RX ADMIN — OXYCODONE HYDROCHLORIDE 10 MILLIGRAM(S): 5 TABLET ORAL at 21:21

## 2017-11-14 RX ADMIN — OXYCODONE HYDROCHLORIDE 10 MILLIGRAM(S): 5 TABLET ORAL at 20:00

## 2017-11-14 RX ADMIN — Medication 1 MILLIGRAM(S): at 15:48

## 2017-11-14 RX ADMIN — OXYCODONE HYDROCHLORIDE 10 MILLIGRAM(S): 5 TABLET ORAL at 14:19

## 2017-11-14 RX ADMIN — HYDROMORPHONE HYDROCHLORIDE 0.5 MILLIGRAM(S): 2 INJECTION INTRAMUSCULAR; INTRAVENOUS; SUBCUTANEOUS at 14:16

## 2017-11-14 RX ADMIN — SODIUM CHLORIDE 3 MILLILITER(S): 9 INJECTION INTRAMUSCULAR; INTRAVENOUS; SUBCUTANEOUS at 22:00

## 2017-11-14 RX ADMIN — FENTANYL CITRATE 50 MICROGRAM(S): 50 INJECTION INTRAVENOUS at 14:00

## 2017-11-14 NOTE — PHYSICAL THERAPY INITIAL EVALUATION ADULT - ACTIVE RANGE OF MOTION EXAMINATION, REHAB EVAL
L to THP/bilateral lower extremity Active ROM was WNL (within normal limits)/bilateral  lower extremity Active ROM was WFL (within functional limits)

## 2017-11-14 NOTE — PROGRESS NOTE ADULT - SUBJECTIVE AND OBJECTIVE BOX
Orthopedics Post-op Check    This is a 63y/o Male s/p Left Total Hip Arthroplasty POD 0.  Pain is controlled. Pt feeling well. No nausea or vomiting. Pt was up OOB today with PT.    Vital Signs Last 24 Hrs  T(C): 36.4 (11-14-17 @ 15:14), Max: 36.6 (11-14-17 @ 10:51)  T(F): 97.5 (11-14-17 @ 15:14), Max: 97.9 (11-14-17 @ 10:51)  HR: 62 (11-14-17 @ 15:14) (61 - 66)  BP: 123/77 (11-14-17 @ 15:14) (119/77 - 133/81)  BP(mean): --  RR: 16 (11-14-17 @ 15:14) (11 - 16)  SpO2: 96% (11-14-17 @ 15:14) (96% - 100%)                        12.5   9.2   )-----------( 203      ( 14 Nov 2017 14:03 )             37.0     14 Nov 2017 14:03    142    |  107    |  14     ----------------------------<  98     4.5     |  28     |  0.79     Ca    8.6        14 Nov 2017 14:03      PT/INR - ( 14 Nov 2017 10:50 )   PT: 11.0 sec;   INR: 1.02 ratio        Exam:  NAD AAOx3.  Dressing clean and dry; Hemovac in place.  Abduction pillow and SCDs in place.  Calves are soft and nontender.  Moving all toes, sensation intact.  DP and PT pulses 2+.    A/P:  Stable POD 0 Left Total Hip Arthroplasty  -Analgesia  -Ppx ABX  -DVT PE ppx  -OOB PT posterior dislocation precautions  -Abduction pillow

## 2017-11-14 NOTE — DISCHARGE NOTE ADULT - NS AS ACTIVITY OBS
Walking-Outdoors allowed/Walking-Indoors allowed/No Heavy lifting/straining/Stairs allowed/Do not drive or operate machinery

## 2017-11-14 NOTE — BRIEF OPERATIVE NOTE - PROCEDURE
<<-----Click on this checkbox to enter Procedure Total hip arthroplasty  11/14/2017  left  Active  NOLSON

## 2017-11-14 NOTE — PHYSICAL THERAPY INITIAL EVALUATION ADULT - GENERAL OBSERVATIONS, REHAB EVAL
Patient received in bed on 2N, POD #0, abduction pillow, flowtrons, hemovac in use, +IV. Patient c/o mild "discomfort" in L hip.

## 2017-11-14 NOTE — CONSULT NOTE ADULT - SUBJECTIVE AND OBJECTIVE BOX
c/c: left hip pain    HPI: 62M, pmh of HTN, HLD, Mitral valve prolapse, diverticulosis, nephrolithiasis, lumbar herniated disc, tinnitus, fatty liver, asthma, OA admitted for elective left hip replacement. Hospitalist service consulted for medical comanagement.   Pt seen and examined on 2N. Feels well. Walked with physical therapy. No dizziness/lightheadedness. Wanted to do more. no cp/sob. Tolerated po.     ROS: all 10 systems reviewed and is as above otherwise negative    PMH: as above  PSH: left shoulder arthoscopy, hernia repair, lithotripsy, sinus sx, rotator cuff sx, right hip replacement  F/H: Father-multiple myeloma, mother-pancreatic ca  Social: no tobacco use, occasional etoh  Allergies: NKDA  Home meds: please see med rec    Vital Signs Last 24 Hrs  T(C): 36.4 (14 Nov 2017 15:14), Max: 36.6 (14 Nov 2017 10:51)  T(F): 97.5 (14 Nov 2017 15:14), Max: 97.9 (14 Nov 2017 10:51)  HR: 62 (14 Nov 2017 15:14) (61 - 66)  BP: 123/77 (14 Nov 2017 15:14) (119/77 - 133/81)  RR: 16 (14 Nov 2017 15:14) (11 - 16)  SpO2: 96% (14 Nov 2017 15:14) (96% - 100%)    PHYSICAL EXAM:    GENERAL: Comfortable, no acute distress   HEAD:  Normocephalic, atraumatic  EYES: EOMI, PERRLA  HEENT: Moist mucous membranes  NECK: Supple, No JVD  NERVOUS SYSTEM:  Alert & Oriented X3, non focal.   CHEST/LUNG: Clear to auscultation bilaterally  HEART: Regular rate and rhythm  ABDOMEN: Soft, Nontender, Nondistended, Bowel sounds present  GENITOURINARY: Voiding, no palpable bladder  EXTREMITIES:   No clubbing, cyanosis, or edema  MUSCULOSKELETAL- No muscle tenderness, no joint tenderness. +Left hip hemovac. +dressing intact.  SKIN-no rash    LABS:                        12.5   9.2   )-----------( 203      ( 14 Nov 2017 14:03 )             37.0     11-14    142  |  107  |  14  ----------------------------<  98  4.5   |  28  |  0.79    Ca    8.6      14 Nov 2017 14:03      PT/INR - ( 14 Nov 2017 10:50 )   PT: 11.0 sec;   INR: 1.02 ratio       MEDICATIONS  (STANDING):  acetaminophen   Tablet 650 milliGRAM(s) Oral every 6 hours  ascorbic acid 500 milliGRAM(s) Oral two times a day  buDESOnide 160 MICROgram(s)/formoterol 4.5 MICROgram(s) Inhaler 2 Puff(s) Inhalation two times a day  ceFAZolin   IVPB 2000 milliGRAM(s) IV Intermittent every 8 hours  celecoxib 200 milliGRAM(s) Oral with breakfast  docusate sodium 100 milliGRAM(s) Oral three times a day  ferrous    sulfate 325 milliGRAM(s) Oral three times a day with meals  folic acid 1 milliGRAM(s) Oral daily  hydrochlorothiazide 12.5 milliGRAM(s) Oral daily  lactated ringers. 1000 milliLiter(s) (75 mL/Hr) IV Continuous <Continuous>  montelukast 10 milliGRAM(s) Oral at bedtime  multivitamin 1 Tablet(s) Oral daily  nebivolol 2.5 milliGRAM(s) Oral daily  oxyCODONE  ER Tablet 10 milliGRAM(s) Oral every 12 hours  pantoprazole    Tablet 40 milliGRAM(s) Oral daily  polyethylene glycol 3350 17 Gram(s) Oral daily  senna 2 Tablet(s) Oral at bedtime  sodium chloride 0.9% lock flush 3 milliLiter(s) IV Push every 8 hours    MEDICATIONS  (PRN):  acetaminophen   Tablet 650 milliGRAM(s) Oral every 6 hours PRN For Temp greater than 38 C (100.4 F)  aluminum hydroxide/magnesium hydroxide/simethicone Suspension 30 milliLiter(s) Oral four times a day PRN Indigestion  benzocaine 15 mG/menthol 3.6 mG Lozenge 1 Lozenge Oral every 3 hours PRN Sore Throat  diphenhydrAMINE   Capsule 25 milliGRAM(s) Oral every 4 hours PRN Rash and/or Itching  diphenhydrAMINE   Capsule 25 milliGRAM(s) Oral at bedtime PRN Insomnia  HYDROmorphone  Injectable 0.5 milliGRAM(s) IV Push every 3 hours PRN Severe Pain (7 - 10)  ondansetron Injectable 4 milliGRAM(s) IV Push every 6 hours PRN Nausea and/or Vomiting  oxyCODONE    IR 5 milliGRAM(s) Oral every 4 hours PRN Mild Pain (1 - 3)  oxyCODONE    IR 10 milliGRAM(s) Oral every 4 hours PRN Moderate Pain (4 - 6)  senna 2 Tablet(s) Oral at bedtime PRN Constipation    ASSESSMENT AND PLAN:  62m, PMH AS ABOVE A/W:    1. Left hip OA s/p left hip replacement:  -pod #0  -pain control  -physical therapy  -incentive spirometry    2. Acute blood loss anemia:  -due to surgery  -monitor h/h  -no need for transfusion at this time.    3. HTN:  -stable.  -continue bystolic, hctz    4. HLD:  -statin    5. Asthma:  -stable.  -nebs prn    6. DVT px    Thank you for this consultation , will follow with you.

## 2017-11-14 NOTE — DISCHARGE NOTE ADULT - CARE PLAN
Principal Discharge DX:	S/P total hip arthroplasty  Goal:	Return to Baseline ADLs  Instructions for follow-up, activity and diet:	Discharge Instructions Total Hip Arthroplasty    1. Diet: Resume previous diet  2. Activity: WBAT. Rolling walker. Posterior Hip Dislocation Precautions. Abduction Pillow while in bed and Chair. Daily Physical Therapy.  3. Call with: fever over 101, wound redness, drainage or open area, calf pain/calf swelling.  4. Wound Care: Remove old and Place new Aquacel bandage to hip wound every 7days.   5. RN to Remove Staples Post Op Day #14 (11/28/17) so long as wound is healed, no drainage or open area.   6. OK to Shower with Aquacel. Must be an Aquacel. Avoid direct water beating on bandage.   7. DVT PE Prophylaxis: Managed by Anticoag Team. See Anticoagulation Instructions. See Med Rec.  8.  Continue Protonix daily while on Anticoagulant. An eRx has been sent to your pharmacy.  9. Labs: Check H&H weekly while on Anticoagulation. Check INR while on Coumadin.  10.  Follow Up: Dr. Olmedo 1 month;  Call to schedule.   11. Pain Medication: eRX sent to your pharmacy for  if you go home. Otherwise please see Med Rec. Principal Discharge DX:	S/P total hip arthroplasty  Goal:	Return to Baseline ADLs  Instructions for follow-up, activity and diet:	Discharge Instructions Total Hip Arthroplasty    1. Diet: Resume previous diet  2. Activity: WBAT. Rolling walker. Posterior Hip Dislocation Precautions. Abduction Pillow while in bed and Chair. Daily Physical Therapy.  3. Call with: fever over 101, wound redness, drainage or open area, calf pain/calf swelling.  4. Wound Care: Remove old and Place new Aquacel bandage to hip wound every 7days.   5. RN to Remove Staples Post Op Day #14 (11/28) so long as wound is healed, no drainage or open area.   6. OK to Shower with Aquacel. Must be an Aquacel. Avoid direct water beating on bandage.   7. DVT PE Prophylaxis: Aspirin  BID x 30 days total  8.  Continue Protonix daily while on Anticoagulant. An eRx has been sent to your pharmacy.  9. Labs: Check H&H weekly while on Anticoagulation. Check INR while on Coumadin.  10.  Follow Up: Dr. Olmedo 1 month;  Call to schedule.   11. Pain Medication: eRX sent to your pharmacy for  if you go home. Principal Discharge DX:	S/P total hip arthroplasty  Goal:	Return to Baseline ADLs  Instructions for follow-up, activity and diet:	Discharge Instructions Total Hip Arthroplasty    1. Diet: Resume previous diet  2. Activity: WBAT. Rolling walker. Posterior Hip Dislocation Precautions. Abduction Pillow while in bed and Chair. Daily Physical Therapy.  3. Call with: fever over 101, wound redness, drainage or open area, calf pain/calf swelling.  4. Wound Care: Remove old and Place new Aquacel bandage to hip wound every 7days.   5. RN to Remove Staples Post Op Day #14 (11/28) so long as wound is healed, no drainage or open area.   6. OK to Shower with Aquacel. Must be an Aquacel. Avoid direct water beating on bandage.   7. DVT PE Prophylaxis: Aspirin  BID x 30 days total  8.  Continue Protonix daily while on Anticoagulant. An eRx has been sent to your pharmacy.  9. Labs: Check H&H weekly while on Anticoagulation.  10.  Follow Up: Dr. Olmedo 1 month; Call to schedule.   11. Pain Medication: Pt reports he has oxycodone RX at home.

## 2017-11-14 NOTE — DISCHARGE NOTE ADULT - MEDICATION SUMMARY - MEDICATIONS TO TAKE
I will START or STAY ON the medications listed below when I get home from the hospital:    oxyCODONE 5 mg oral tablet  -- 1 tab(s) by mouth every 3 hours, As needed, Mild Pain. Contiue home regimen as prescribed by your pain management doctor.  -- Indication: For pain    Aspirin Enteric Coated 325 mg oral delayed release tablet  -- 1 tab(s) by mouth 2 times a day MDD:2, blood clot prevention  -- Swallow whole.  Do not crush.  Take with food or milk.    -- Indication: For blood clot prevention    Bystolic 2.5 mg oral tablet  -- 1 tab(s) by mouth once a day  -- Indication: For home med    Advair Diskus 500 mcg-50 mcg inhalation powder  -- 1 puff(s) inhaled once a day  -- Indication: For home med    hydroCHLOROthiazide 12.5 mg oral capsule  -- 1 cap(s) by mouth once a day  -- Indication: For home med    Colace 100 mg oral capsule  -- 1 cap(s) by mouth 2 times a day MDD:2, while on narcotics  -- Medication should be taken with plenty of water.    -- Indication: For Stool softener    montelukast 10 mg oral tablet  -- 1 tab(s) by mouth once a day  -- Indication: For home med    pantoprazole 40 mg oral delayed release tablet  -- 1 tab(s) by mouth once a day, stomach protection  -- It is very important that you take or use this exactly as directed.  Do not skip doses or discontinue unless directed by your doctor.  Obtain medical advice before taking any non-prescription drugs as some may affect the action of this medication.  Swallow whole.  Do not crush.    -- Indication: For Stomach protection    multivitamin  -- 1 cap(s) by mouth once a day  -- Indication: For vitamin

## 2017-11-14 NOTE — DISCHARGE NOTE ADULT - CARE PROVIDER_API CALL
Husam Olmedo), Orthopaedic Surgery  [address] [place] [address]  Phone: [phone]  Fax: [phone]

## 2017-11-14 NOTE — DISCHARGE NOTE ADULT - HOSPITAL COURSE
H&P:  Pt is a 62y Male  POD 3 s/p Total Hip Arthroplasty. Pt is afebrile with stable vital signs. Pain is controlled. Alert and Oriented. Exam reveals intact EHL FHL TA GS, +DP. Dressing is clean and dry with a New Aquacel bandage on.  Vital Signs Last 24 Hrs  T(C): 36.6 (11-14-17 @ 10:51), Max: 36.6 (11-14-17 @ 10:51)  T(F): 97.9 (11-14-17 @ 10:51), Max: 97.9 (11-14-17 @ 10:51)  HR: 66 (11-14-17 @ 10:51) (66 - 66)  BP: 131/93 (11-14-17 @ 10:51) (131/93 - 131/93)  BP(mean): --  RR: 16 (11-14-17 @ 10:51) (16 - 16)  SpO2: 98% (11-14-17 @ 10:51) (98% - 98%)    PT/INR - ( 14 Nov 2017 10:50 )   PT: 11.0 sec;   INR: 1.02 ratio             Hospital Course:  Patient presented to [facility] after being medically cleared for an elective surgical procedure, having failed outpatient non-operative conservative management. Prophylactic antibiotics were started before the procedure and continued for 24 hours. They were admitted after surgery to the orthopedic floor.   There were no complications during the hospital stay.     Routine consults were obtained from the Anticoagulation Team for DVT/PE prophylaxis, from Physical Therapy for twice daily PT starting on POD 0, and from the Hospitalist for Medical Co-management. Patient was placed on _______ * for anticoagulation.  Pertinent home medications were continued.  Daily labs were followed.      On POD 0 the hemovac drain was removed. POD 2, PT was continued, and on POD 3 a new Aquacel dressing was applied. The pt is ready today for DC to home with home PT** or to Rehab for ongoing PT**.  The orthopedic Attending is aware and agrees. H&P:  Pt is a 62y Male  POD 3 s/p Total Hip Arthroplasty. Pt is afebrile with stable vital signs. Pain is controlled. Alert and Oriented. Exam reveals intact EHL FHL TA GS, +DP. Dressing is clean and dry with a New Aquacel bandage on.  Vital Signs Last 24 Hrs  T(C): 36.6 (11-14-17 @ 10:51), Max: 36.6 (11-14-17 @ 10:51)  T(F): 97.9 (11-14-17 @ 10:51), Max: 97.9 (11-14-17 @ 10:51)  HR: 66 (11-14-17 @ 10:51) (66 - 66)  BP: 131/93 (11-14-17 @ 10:51) (131/93 - 131/93)  BP(mean): --  RR: 16 (11-14-17 @ 10:51) (16 - 16)  SpO2: 98% (11-14-17 @ 10:51) (98% - 98%)    PT/INR - ( 14 Nov 2017 10:50 )   PT: 11.0 sec;   INR: 1.02 ratio             Hospital Course:  Patient presented to [facility] after being medically cleared for an elective surgical procedure, having failed outpatient non-operative conservative management. Prophylactic antibiotics were started before the procedure and continued for 24 hours. They were admitted after surgery to the orthopedic floor.   There were no complications during the hospital stay.     Routine consults were obtained from the Anticoagulation Team for DVT/PE prophylaxis, from Physical Therapy for twice daily PT starting on POD 0, and from the Hospitalist for Medical Co-management. Patient was placed on Aspirin for anticoagulation.  Pertinent home medications were continued.  Daily labs were followed.      On POD 1 the hemovac drain was removed. PT was continued, The pt is ready today POD 1 for DC to home with home PT.  The orthopedic Attending is aware and agrees. H&P:  Pt is a 62y Male s/p Total Hip Arthroplasty. Pt is afebrile with stable vital signs. Pain is controlled. Alert and Oriented. Exam reveals intact EHL FHL TA GS, +DP. Dressing is clean and dry with a New Aquacel bandage on.  Vital Signs Last 24 Hrs  T(C): 36.6 (11-14-17 @ 10:51), Max: 36.6 (11-14-17 @ 10:51)  T(F): 97.9 (11-14-17 @ 10:51), Max: 97.9 (11-14-17 @ 10:51)  HR: 66 (11-14-17 @ 10:51) (66 - 66)  BP: 131/93 (11-14-17 @ 10:51) (131/93 - 131/93)  BP(mean): --  RR: 16 (11-14-17 @ 10:51) (16 - 16)  SpO2: 98% (11-14-17 @ 10:51) (98% - 98%)    PT/INR - ( 14 Nov 2017 10:50 )   PT: 11.0 sec;   INR: 1.02 ratio             Hospital Course:  Patient presented to [facility] after being medically cleared for an elective surgical procedure, having failed outpatient non-operative conservative management. Prophylactic antibiotics were started before the procedure and continued for 24 hours. They were admitted after surgery to the orthopedic floor.   There were no complications during the hospital stay.     Routine consults were obtained from the Anticoagulation Team for DVT/PE prophylaxis, from Physical Therapy for twice daily PT starting on POD 0, and from the Hospitalist for Medical Co-management. Patient was placed on Aspirin for anticoagulation.  Pertinent home medications were continued.  Daily labs were followed.      On POD 1 the hemovac drain was removed. PT was continued, The pt is ready today POD 1 for DC to home with home PT.  The orthopedic Attending is aware and agrees.

## 2017-11-14 NOTE — PHYSICAL THERAPY INITIAL EVALUATION ADULT - MODALITIES TREATMENT COMMENTS
Patient returned to bed by request, call bell in reach and bed alarm active. Abduction pillow and flowtrons replaced.  Ortho PA in inspect incision.

## 2017-11-14 NOTE — DISCHARGE NOTE ADULT - PATIENT PORTAL LINK FT
“You can access the [employer] Patient Portal, offered by [facility], by registering with the following website: [email or web address]”

## 2017-11-15 VITALS
HEART RATE: 71 BPM | SYSTOLIC BLOOD PRESSURE: 128 MMHG | DIASTOLIC BLOOD PRESSURE: 59 MMHG | TEMPERATURE: 98 F | OXYGEN SATURATION: 96 % | RESPIRATION RATE: 16 BRPM

## 2017-11-15 LAB
ANION GAP SERPL CALC-SCNC: 7 MMOL/L — SIGNIFICANT CHANGE UP (ref 5–17)
BUN SERPL-MCNC: 17 MG/DL — SIGNIFICANT CHANGE UP (ref 7–23)
CALCIUM SERPL-MCNC: 8.6 MG/DL — SIGNIFICANT CHANGE UP (ref 8.5–10.1)
CHLORIDE SERPL-SCNC: 105 MMOL/L — SIGNIFICANT CHANGE UP (ref 96–108)
CO2 SERPL-SCNC: 26 MMOL/L — SIGNIFICANT CHANGE UP (ref 22–31)
CREAT SERPL-MCNC: 0.76 MG/DL — SIGNIFICANT CHANGE UP (ref 0.5–1.3)
GLUCOSE SERPL-MCNC: 117 MG/DL — HIGH (ref 70–99)
HCT VFR BLD CALC: 35 % — LOW (ref 39–50)
HGB BLD-MCNC: 11.1 G/DL — LOW (ref 13–17)
MCHC RBC-ENTMCNC: 29.4 PG — SIGNIFICANT CHANGE UP (ref 27–34)
MCHC RBC-ENTMCNC: 31.8 GM/DL — LOW (ref 32–36)
MCV RBC AUTO: 92.3 FL — SIGNIFICANT CHANGE UP (ref 80–100)
PLATELET # BLD AUTO: 193 K/UL — SIGNIFICANT CHANGE UP (ref 150–400)
POTASSIUM SERPL-MCNC: 4.1 MMOL/L — SIGNIFICANT CHANGE UP (ref 3.5–5.3)
POTASSIUM SERPL-SCNC: 4.1 MMOL/L — SIGNIFICANT CHANGE UP (ref 3.5–5.3)
RBC # BLD: 3.8 M/UL — LOW (ref 4.2–5.8)
RBC # FLD: 13.8 % — SIGNIFICANT CHANGE UP (ref 10.3–14.5)
SODIUM SERPL-SCNC: 138 MMOL/L — SIGNIFICANT CHANGE UP (ref 135–145)
WBC # BLD: 11.4 K/UL — HIGH (ref 3.8–10.5)
WBC # FLD AUTO: 11.4 K/UL — HIGH (ref 3.8–10.5)

## 2017-11-15 PROCEDURE — 99253 IP/OBS CNSLTJ NEW/EST LOW 45: CPT

## 2017-11-15 RX ORDER — ASPIRIN/CALCIUM CARB/MAGNESIUM 324 MG
325 TABLET ORAL DAILY
Qty: 0 | Refills: 0 | Status: DISCONTINUED | OUTPATIENT
Start: 2017-11-15 | End: 2017-11-15

## 2017-11-15 RX ORDER — PANTOPRAZOLE SODIUM 20 MG/1
1 TABLET, DELAYED RELEASE ORAL
Qty: 14 | Refills: 0 | OUTPATIENT
Start: 2017-11-15 | End: 2017-11-29

## 2017-11-15 RX ORDER — ASPIRIN/CALCIUM CARB/MAGNESIUM 324 MG
325 TABLET ORAL DAILY
Qty: 0 | Refills: 0 | Status: DISCONTINUED | OUTPATIENT
Start: 2017-11-16 | End: 2017-11-15

## 2017-11-15 RX ORDER — DOCUSATE SODIUM 100 MG
1 CAPSULE ORAL
Qty: 14 | Refills: 0 | OUTPATIENT
Start: 2017-11-15 | End: 2017-11-22

## 2017-11-15 RX ORDER — ASPIRIN/CALCIUM CARB/MAGNESIUM 324 MG
1 TABLET ORAL
Qty: 60 | Refills: 0
Start: 2017-11-15 | End: 2017-12-15

## 2017-11-15 RX ADMIN — CELECOXIB 200 MILLIGRAM(S): 200 CAPSULE ORAL at 08:56

## 2017-11-15 RX ADMIN — OXYCODONE HYDROCHLORIDE 10 MILLIGRAM(S): 5 TABLET ORAL at 05:57

## 2017-11-15 RX ADMIN — Medication 1 TABLET(S): at 12:01

## 2017-11-15 RX ADMIN — OXYCODONE HYDROCHLORIDE 10 MILLIGRAM(S): 5 TABLET ORAL at 02:00

## 2017-11-15 RX ADMIN — OXYCODONE HYDROCHLORIDE 10 MILLIGRAM(S): 5 TABLET ORAL at 07:01

## 2017-11-15 RX ADMIN — Medication 100 MILLIGRAM(S): at 05:57

## 2017-11-15 RX ADMIN — Medication 650 MILLIGRAM(S): at 12:01

## 2017-11-15 RX ADMIN — PANTOPRAZOLE SODIUM 40 MILLIGRAM(S): 20 TABLET, DELAYED RELEASE ORAL at 12:01

## 2017-11-15 RX ADMIN — Medication 650 MILLIGRAM(S): at 01:24

## 2017-11-15 RX ADMIN — Medication 1 MILLIGRAM(S): at 12:01

## 2017-11-15 RX ADMIN — OXYCODONE HYDROCHLORIDE 10 MILLIGRAM(S): 5 TABLET ORAL at 01:23

## 2017-11-15 RX ADMIN — OXYCODONE HYDROCHLORIDE 10 MILLIGRAM(S): 5 TABLET ORAL at 07:28

## 2017-11-15 RX ADMIN — OXYCODONE HYDROCHLORIDE 10 MILLIGRAM(S): 5 TABLET ORAL at 12:02

## 2017-11-15 RX ADMIN — NEBIVOLOL HYDROCHLORIDE 2.5 MILLIGRAM(S): 5 TABLET ORAL at 05:57

## 2017-11-15 RX ADMIN — Medication 325 MILLIGRAM(S): at 08:56

## 2017-11-15 RX ADMIN — SODIUM CHLORIDE 3 MILLILITER(S): 9 INJECTION INTRAMUSCULAR; INTRAVENOUS; SUBCUTANEOUS at 07:28

## 2017-11-15 RX ADMIN — ENOXAPARIN SODIUM 40 MILLIGRAM(S): 100 INJECTION SUBCUTANEOUS at 05:56

## 2017-11-15 RX ADMIN — Medication 650 MILLIGRAM(S): at 05:57

## 2017-11-15 RX ADMIN — OXYCODONE HYDROCHLORIDE 10 MILLIGRAM(S): 5 TABLET ORAL at 06:02

## 2017-11-15 RX ADMIN — Medication 500 MILLIGRAM(S): at 05:56

## 2017-11-15 RX ADMIN — Medication 12.5 MILLIGRAM(S): at 05:58

## 2017-11-15 NOTE — CONSULT NOTE ADULT - SUBJECTIVE AND OBJECTIVE BOX
HPI:      Patient is a 62y old  Male who presents with a chief complaint of inc left hip pain, h/o OA, now S/P Left THR (14 Nov 2017 13:54)      Consulted by Dr. Olmedo  for VTE prophylaxis, risk stratification, and anticoagulation management.    PAST MEDICAL & SURGICAL HISTORY:  Asthma  Osteoarthritis: left hip  Nasal polyps: history of  Seasonal allergic rhinitis due to pollen  Lumbar herniated disc  Osteoarthritis of right hip, unspecified osteoarthritis type  History of kidney stones  Diverticulosis of intestine without bleeding, unspecified intestinal tract location  MVP (mitral valve prolapse)  Essential hypertension  Tinnitus of both ears  History of hip replacement, total, right: 07/2017  H/O sinus surgery: x 3  H/O arthroscopy of shoulder: left 2/ 2016  H/O lithotripsy: 2015  H/O hernia repair: 11/3/2016          CrCl:    Caprini VTE Risk Score: #7          IMPROVE Bleeding Risk Score #2.5    Falls Risk:   High (x  )  Mod (  )  Low (  )      FAMILY HISTORY:  Family history of pancreatic cancer (Mother)  Family history of multiple myeloma (Father)    Denies any personal or familial history of clotting or bleeding disorders.    Allergies    No Known Allergies    Intolerances        REVIEW OF SYSTEMS    (  )Fever	     (  )Constipation	(  )SOB				(  )Headache	(  )Dysuria  (  )Chills	     (  )Melena	(  )Dyspnea present on exertion	                    (  )Dizziness                    (  )Polyuria  (  )Nausea	     (  )Hematochezia	(  )Cough			                    (  )Syncope   	(  )Hematuria  (  )Vomiting    (  )Chest Pain	(  )Wheezing			(  )Weakness  (  )Diarrhea     (  )Palpitations	(  )Anorexia			(  )Myalgia    All other review of systems negative: Yes        PHYSICAL EXAM:    Constitutional: Appears Well    Neurological: A& O x 3    Skin: Warm    Respiratory and Thorax: normal effort; Breath sounds: normal; No rales/wheezing/rhonchi  	  Cardiovascular: S1, S2, regular, NMBR	    Gastrointestinal: BS + x 4Q, nontender	    Genitourinary:  Bladder nondistended, nontender    Musculoskeletal:   General Right:   no muscle/joint tenderness,   normal tone, no joint swelling,   ROM: full	    General Left:   + muscle/joint tenderness,   normal tone, no joint swelling,   ROM: limited    Hip:             Left: Dressing CDI; Drain: hemovac ; Type of drng.: serosang.; Amt. of drng: small      Lower extrems:   Right: no calf tenderness              negative anita's sign               + pedal pulses    Left:   no calf tenderness              negative anita's sign               + pedal pulses                          11.1   11.4  )-----------( 193      ( 15 Nov 2017 05:41 )             35.0       11-15    138  |  105  |  17  ----------------------------<  117<H>  4.1   |  26  |  0.76    Ca    8.6      15 Nov 2017 05:41        PT/INR - ( 14 Nov 2017 10:50 )   PT: 11.0 sec;   INR: 1.02 ratio         				    MEDICATIONS  (STANDING):  acetaminophen   Tablet 650 milliGRAM(s) Oral every 6 hours  ascorbic acid 500 milliGRAM(s) Oral two times a day  buDESOnide 160 MICROgram(s)/formoterol 4.5 MICROgram(s) Inhaler 2 Puff(s) Inhalation two times a day  celecoxib 200 milliGRAM(s) Oral with breakfast  docusate sodium 100 milliGRAM(s) Oral three times a day  enoxaparin Injectable 40 milliGRAM(s) SubCutaneous every 24 hours  ferrous    sulfate 325 milliGRAM(s) Oral three times a day with meals  folic acid 1 milliGRAM(s) Oral daily  hydrochlorothiazide 12.5 milliGRAM(s) Oral daily  montelukast 10 milliGRAM(s) Oral at bedtime  multivitamin 1 Tablet(s) Oral daily  nebivolol 2.5 milliGRAM(s) Oral daily  oxyCODONE  ER Tablet 10 milliGRAM(s) Oral every 12 hours  pantoprazole    Tablet 40 milliGRAM(s) Oral daily  polyethylene glycol 3350 17 Gram(s) Oral daily  senna 2 Tablet(s) Oral at bedtime  sodium chloride 0.9% lock flush 3 milliLiter(s) IV Push every 8 hours  sodium chloride 0.9%. 1000 milliLiter(s) IV Continuous <Continuous>          DVT Prophylaxis:  LMWH                   (  )  Heparin SQ           (  )  Coumadin             (  )  Xarelto                  (  )  Eliquis                   (  )  Venodynes           (  )  Ambulation          (  )  UFH                       (  )  Contraindicated  (  ) HPI:      Patient is a 62y old  Male who presents with a chief complaint of inc left hip pain, h/o OA, now S/P Left THR (14 Nov 2017 13:54)      Consulted by Dr. Olmedo  for VTE prophylaxis, risk stratification, and anticoagulation management.    PAST MEDICAL & SURGICAL HISTORY:  Asthma  Osteoarthritis: left hip  Nasal polyps: history of  Seasonal allergic rhinitis due to pollen  Lumbar herniated disc  Osteoarthritis of right hip, unspecified osteoarthritis type  History of kidney stones  Diverticulosis of intestine without bleeding, unspecified intestinal tract location  MVP (mitral valve prolapse)  Essential hypertension  Tinnitus of both ears  History of hip replacement, total, right: 07/2017  H/O sinus surgery: x 3  H/O arthroscopy of shoulder: left 2/ 2016  H/O lithotripsy: 2015  H/O hernia repair: 11/3/2016          CrCl:    Caprini VTE Risk Score: #7          IMPROVE Bleeding Risk Score #2.5    Falls Risk:   High (x  )  Mod (  )  Low (  )      FAMILY HISTORY:  Family history of pancreatic cancer (Mother)  Family history of multiple myeloma (Father)    Denies any personal or familial history of clotting or bleeding disorders.    Allergies    No Known Allergies    Intolerances        REVIEW OF SYSTEMS    (  )Fever	     (  )Constipation	(  )SOB				(  )Headache	(  )Dysuria  (  )Chills	     (  )Melena	(  )Dyspnea present on exertion	                    (  )Dizziness                    (  )Polyuria  (  )Nausea	     (  )Hematochezia	(  )Cough			                    (  )Syncope   	(  )Hematuria  (  )Vomiting    (  )Chest Pain	(  )Wheezing			(  )Weakness  (  )Diarrhea     (  )Palpitations	(  )Anorexia			(  )Myalgia    All other review of systems negative: Yes        PHYSICAL EXAM:    Constitutional: Appears Well    Neurological: A& O x 3    Skin: Warm    Respiratory and Thorax: normal effort; Breath sounds: normal; No rales/wheezing/rhonchi  	  Cardiovascular: S1, S2, regular, NMBR	    Gastrointestinal: BS + x 4Q, nontender	    Genitourinary:  Bladder nondistended, nontender    Musculoskeletal:   General Right:   no muscle/joint tenderness,   normal tone, no joint swelling,   ROM: full	    General Left:   + muscle/joint tenderness,   normal tone, no joint swelling,   ROM: limited    Hip:             Left: Dressing CDI; Drain: hemovac ; Type of drng.: serosang.; Amt. of drng: small      Lower extrems:   Right: no calf tenderness              negative anita's sign               + pedal pulses    Left:   no calf tenderness              negative anita's sign               + pedal pulses                          11.1   11.4  )-----------( 193      ( 15 Nov 2017 05:41 )             35.0       11-15    138  |  105  |  17  ----------------------------<  117<H>  4.1   |  26  |  0.76    Ca    8.6      15 Nov 2017 05:41        PT/INR - ( 14 Nov 2017 10:50 )   PT: 11.0 sec;   INR: 1.02 ratio         				    MEDICATIONS  (STANDING):  acetaminophen   Tablet 650 milliGRAM(s) Oral every 6 hours  ascorbic acid 500 milliGRAM(s) Oral two times a day  buDESOnide 160 MICROgram(s)/formoterol 4.5 MICROgram(s) Inhaler 2 Puff(s) Inhalation two times a day  celecoxib 200 milliGRAM(s) Oral with breakfast  docusate sodium 100 milliGRAM(s) Oral three times a day  enoxaparin Injectable 40 milliGRAM(s) SubCutaneous every 24 hours  ferrous    sulfate 325 milliGRAM(s) Oral three times a day with meals  folic acid 1 milliGRAM(s) Oral daily  hydrochlorothiazide 12.5 milliGRAM(s) Oral daily  montelukast 10 milliGRAM(s) Oral at bedtime  multivitamin 1 Tablet(s) Oral daily  nebivolol 2.5 milliGRAM(s) Oral daily  oxyCODONE  ER Tablet 10 milliGRAM(s) Oral every 12 hours  pantoprazole    Tablet 40 milliGRAM(s) Oral daily  polyethylene glycol 3350 17 Gram(s) Oral daily  senna 2 Tablet(s) Oral at bedtime  sodium chloride 0.9% lock flush 3 milliLiter(s) IV Push every 8 hours  sodium chloride 0.9%. 1000 milliLiter(s) IV Continuous <Continuous>          DVT Prophylaxis:  LMWH                   (  )  Heparin SQ           (  )  Coumadin             (  )  Xarelto                  (  )  Eliquis                   (  )  Venodynes           (  )  Ambulation          (  )  UFH                       (  )  Contraindicated  (  )  ASA (X)

## 2017-11-15 NOTE — PROGRESS NOTE ADULT - ASSESSMENT
62M s/p L CLAUDIA POD 1  Analgesia  DVT ppx  WBAT with posterior hip precautions  PT with posterior hip precautions  DC planning to home possibly today if no issues with PT/labs

## 2017-11-15 NOTE — PROGRESS NOTE ADULT - SUBJECTIVE AND OBJECTIVE BOX
Pt S/E at bedside, no acute events overnight, pain controlled. Pt states he wishes to go home today after physical therapy.    Vital Signs Last 24 Hrs  T(C): 36.7 (15 Nov 2017 05:42), Max: 36.7 (14 Nov 2017 17:38)  T(F): 98 (15 Nov 2017 05:42), Max: 98.1 (14 Nov 2017 22:08)  HR: 68 (15 Nov 2017 05:42) (61 - 68)  BP: 134/75 (15 Nov 2017 05:42) (107/71 - 134/75)  BP(mean): --  RR: 16 (15 Nov 2017 05:42) (11 - 16)  SpO2: 95% (15 Nov 2017 05:42) (94% - 100%)    Gen: NAD, AAOx3    Left Lower Extremity:  Dressing clean dry intact, +HMV  +EHL/FHL/TA/GS  SILT L3-S1  +DP/PT Pulses  Compartments soft  No calf TTP B/L

## 2017-11-15 NOTE — CONSULT NOTE ADULT - ASSESSMENT
63 yo male S/P left THR, POD#1, mod thrombosis risk due to age, surgery, BMI    Discussed the risks vs benefits of full dose aspirin therapy with patient. Agrees with treatment and understands the necessity of therapy.    Plan: D/c lovenox, had dose this am already    start Enteric coated ASA 325mg PO BID x 30 days, tomorrow am    Pepcid 20 mg PO daily    Daily CBC/BMP    Venodynes    Enc ambulation    Thank you for this consult, will sign off please reconsult if needed

## 2017-11-15 NOTE — PROGRESS NOTE ADULT - SUBJECTIVE AND OBJECTIVE BOX
c/c: left hip pain    HPI: 62M, pmh of HTN, HLD, Mitral valve prolapse, diverticulosis, nephrolithiasis, lumbar herniated disc, tinnitus, fatty liver, asthma, OA admitted for elective left hip replacement. Hospitalist service consulted for medical comanagement.     11/15: pt seen and examined this am. Doing well. Was waiting to work with physical therapy. Wants to go home asap. No chest pain/sob. No difficulty voiding.    ROS: all 10 systems reviewed and is as above otherwise negative    Vital Signs Last 24 Hrs  T(C): 36.8 (15 Nov 2017 11:28), Max: 36.8 (15 Nov 2017 11:28)  T(F): 98.3 (15 Nov 2017 11:28), Max: 98.3 (15 Nov 2017 11:28)  HR: 71 (15 Nov 2017 11:28) (61 - 71)  BP: 128/59 (15 Nov 2017 11:28) (107/71 - 134/75)  BP(mean): --  RR: 16 (15 Nov 2017 11:28) (11 - 16)  SpO2: 96% (15 Nov 2017 11:28) (94% - 100%)  PHYSICAL EXAM:    GENERAL: Comfortable, no acute distress   HEAD:  Normocephalic, atraumatic  EYES: EOMI, PERRLA  HEENT: Moist mucous membranes  NECK: Supple, No JVD  NERVOUS SYSTEM:  Alert & Oriented X3, non focal.   CHEST/LUNG: Clear to auscultation bilaterally  HEART: Regular rate and rhythm  ABDOMEN: Soft, Nontender, Nondistended, Bowel sounds present  GENITOURINARY: Voiding, no palpable bladder  EXTREMITIES:   No clubbing, cyanosis, or edema  MUSCULOSKELETAL- No muscle tenderness, no joint tenderness. +Left hip hemovac. +dressing intact.  SKIN-no rash  LABS:                        11.1   11.4  )-----------( 193      ( 15 Nov 2017 05:41 )             35.0     11-15    138  |  105  |  17  ----------------------------<  117<H>  4.1   |  26  |  0.76    Ca    8.6      15 Nov 2017 05:41      PT/INR - ( 14 Nov 2017 10:50 )   PT: 11.0 sec;   INR: 1.02 ratio        MEDICATIONS  (STANDING):  acetaminophen   Tablet 650 milliGRAM(s) Oral every 6 hours  ascorbic acid 500 milliGRAM(s) Oral two times a day  buDESOnide 160 MICROgram(s)/formoterol 4.5 MICROgram(s) Inhaler 2 Puff(s) Inhalation two times a day  ceFAZolin   IVPB 2000 milliGRAM(s) IV Intermittent every 8 hours  celecoxib 200 milliGRAM(s) Oral with breakfast  docusate sodium 100 milliGRAM(s) Oral three times a day  ferrous    sulfate 325 milliGRAM(s) Oral three times a day with meals  folic acid 1 milliGRAM(s) Oral daily  hydrochlorothiazide 12.5 milliGRAM(s) Oral daily  lactated ringers. 1000 milliLiter(s) (75 mL/Hr) IV Continuous <Continuous>  montelukast 10 milliGRAM(s) Oral at bedtime  multivitamin 1 Tablet(s) Oral daily  nebivolol 2.5 milliGRAM(s) Oral daily  oxyCODONE  ER Tablet 10 milliGRAM(s) Oral every 12 hours  pantoprazole    Tablet 40 milliGRAM(s) Oral daily  polyethylene glycol 3350 17 Gram(s) Oral daily  senna 2 Tablet(s) Oral at bedtime  sodium chloride 0.9% lock flush 3 milliLiter(s) IV Push every 8 hours    MEDICATIONS  (PRN):  acetaminophen   Tablet 650 milliGRAM(s) Oral every 6 hours PRN For Temp greater than 38 C (100.4 F)  aluminum hydroxide/magnesium hydroxide/simethicone Suspension 30 milliLiter(s) Oral four times a day PRN Indigestion  benzocaine 15 mG/menthol 3.6 mG Lozenge 1 Lozenge Oral every 3 hours PRN Sore Throat  diphenhydrAMINE   Capsule 25 milliGRAM(s) Oral every 4 hours PRN Rash and/or Itching  diphenhydrAMINE   Capsule 25 milliGRAM(s) Oral at bedtime PRN Insomnia  HYDROmorphone  Injectable 0.5 milliGRAM(s) IV Push every 3 hours PRN Severe Pain (7 - 10)  ondansetron Injectable 4 milliGRAM(s) IV Push every 6 hours PRN Nausea and/or Vomiting  oxyCODONE    IR 5 milliGRAM(s) Oral every 4 hours PRN Mild Pain (1 - 3)  oxyCODONE    IR 10 milliGRAM(s) Oral every 4 hours PRN Moderate Pain (4 - 6)  senna 2 Tablet(s) Oral at bedtime PRN Constipation    ASSESSMENT AND PLAN:  62m, PMH AS ABOVE A/W:    1. Left hip OA s/p left hip replacement:  -pod #1  -pain control  -physical therapy  -incentive spirometry    2. Acute blood loss anemia:  -due to surgery  -monitor h/h  -no need for transfusion at this time.    3. HTN:  -stable.  -continue bystolic, hctz    4. HLD:  -statin    5. Asthma:  -stable.  -nebs prn    6. DVT px

## 2017-11-15 NOTE — PROGRESS NOTE ADULT - SUBJECTIVE AND OBJECTIVE BOX
Orthopedics     POD 1 Total Hip Arthroplasty  Pain is controlled. Pt feeling well. No nausea or vomiting. Has been OOB with PT. Wants to go home today    Vital Signs Last 24 Hrs  T(C): 36.8 (11-15-17 @ 11:28), Max: 36.8 (11-15-17 @ 11:28)  T(F): 98.3 (11-15-17 @ 11:28), Max: 98.3 (11-15-17 @ 11:28)  HR: 71 (11-15-17 @ 11:28) (61 - 71)  BP: 128/59 (11-15-17 @ 11:28) (107/71 - 134/75)  BP(mean): --  RR: 16 (11-15-17 @ 11:28) (11 - 16)  SpO2: 96% (11-15-17 @ 11:28) (94% - 100%)                        11.1   11.4  )-----------( 193      ( 15 Nov 2017 05:41 )             35.0     15 Nov 2017 05:41    138    |  105    |  17     ----------------------------<  117    4.1     |  26     |  0.76     Ca    8.6        15 Nov 2017 05:41      PT/INR - ( 14 Nov 2017 10:50 )   PT: 11.0 sec;   INR: 1.02 ratio             Exam:  NAD AAOx3  Dressing clean and dry  +EHL FHL TA GS  SILT toes 1-5  +DP  Calf Soft NT    A/P:  Stable POD 1 LEFT Total Hip Arthroplasty  -Analgesia  -DVT PE ppx  -OOB PT posterior dislocation precautions  -Hemovac drain removed uneventfully  -PT requests DC home today, ok with Dr. Olmedo  -Aquacel left in place will be changed with home RN tomorrow.  -Pt has oxycodone at home from is PM MD, does not need Rx  - pt will receive PT here and if does well can go home in afternoon.  Dr. Olmedo agrees with plan, and he discussed with pt.

## 2017-11-17 DIAGNOSIS — I34.1 NONRHEUMATIC MITRAL (VALVE) PROLAPSE: ICD-10-CM

## 2017-11-17 DIAGNOSIS — K76.0 FATTY (CHANGE OF) LIVER, NOT ELSEWHERE CLASSIFIED: ICD-10-CM

## 2017-11-17 DIAGNOSIS — D62 ACUTE POSTHEMORRHAGIC ANEMIA: ICD-10-CM

## 2017-11-17 DIAGNOSIS — H93.19 TINNITUS, UNSPECIFIED EAR: ICD-10-CM

## 2017-11-17 DIAGNOSIS — E78.2 MIXED HYPERLIPIDEMIA: ICD-10-CM

## 2017-11-17 DIAGNOSIS — J45.909 UNSPECIFIED ASTHMA, UNCOMPLICATED: ICD-10-CM

## 2017-11-17 DIAGNOSIS — M16.12 UNILATERAL PRIMARY OSTEOARTHRITIS, LEFT HIP: ICD-10-CM

## 2017-11-17 DIAGNOSIS — I10 ESSENTIAL (PRIMARY) HYPERTENSION: ICD-10-CM

## 2017-11-17 DIAGNOSIS — J32.9 CHRONIC SINUSITIS, UNSPECIFIED: ICD-10-CM

## 2017-11-17 DIAGNOSIS — Z87.442 PERSONAL HISTORY OF URINARY CALCULI: ICD-10-CM

## 2017-11-17 DIAGNOSIS — K57.90 DIVERTICULOSIS OF INTESTINE, PART UNSPECIFIED, WITHOUT PERFORATION OR ABSCESS WITHOUT BLEEDING: ICD-10-CM

## 2017-11-17 DIAGNOSIS — Z96.641 PRESENCE OF RIGHT ARTIFICIAL HIP JOINT: ICD-10-CM

## 2017-11-17 LAB — SURGICAL PATHOLOGY FINAL REPORT - CH: SIGNIFICANT CHANGE UP

## 2017-11-28 ENCOUNTER — APPOINTMENT (OUTPATIENT)
Dept: PULMONOLOGY | Facility: CLINIC | Age: 62
End: 2017-11-28
Payer: COMMERCIAL

## 2017-11-28 VITALS — BODY MASS INDEX: 29.45 KG/M2 | DIASTOLIC BLOOD PRESSURE: 60 MMHG | WEIGHT: 177 LBS | SYSTOLIC BLOOD PRESSURE: 120 MMHG

## 2017-11-28 PROCEDURE — 96372 THER/PROPH/DIAG INJ SC/IM: CPT

## 2017-11-28 RX ORDER — MEPOLIZUMAB 100 MG/ML
100 INJECTION, POWDER, FOR SOLUTION SUBCUTANEOUS
Refills: 0 | Status: COMPLETED | OUTPATIENT
Start: 2017-11-28

## 2017-11-28 RX ADMIN — MEPOLIZUMAB 1 MG: 100 INJECTION, POWDER, FOR SOLUTION SUBCUTANEOUS at 00:00

## 2017-12-11 ENCOUNTER — APPOINTMENT (OUTPATIENT)
Dept: PULMONOLOGY | Facility: CLINIC | Age: 62
End: 2017-12-11
Payer: COMMERCIAL

## 2017-12-11 VITALS — HEART RATE: 81 BPM | SYSTOLIC BLOOD PRESSURE: 120 MMHG | OXYGEN SATURATION: 96 % | DIASTOLIC BLOOD PRESSURE: 86 MMHG

## 2017-12-11 VITALS — RESPIRATION RATE: 16 BRPM

## 2017-12-11 PROCEDURE — 99214 OFFICE O/P EST MOD 30 MIN: CPT

## 2017-12-11 RX ORDER — METHYLPREDNISOLONE 4 MG/1
4 TABLET ORAL
Qty: 21 | Refills: 0 | Status: DISCONTINUED | COMMUNITY
Start: 2017-06-29 | End: 2017-12-11

## 2017-12-11 RX ORDER — PREDNISONE 10 MG/1
10 TABLET ORAL
Qty: 30 | Refills: 1 | Status: DISCONTINUED | COMMUNITY
Start: 2017-08-31 | End: 2017-12-11

## 2017-12-11 RX ORDER — AZITHROMYCIN 250 MG/1
250 TABLET, FILM COATED ORAL
Qty: 1 | Refills: 6 | Status: DISCONTINUED | COMMUNITY
Start: 2017-08-29 | End: 2017-12-11

## 2017-12-26 ENCOUNTER — TRANSCRIPTION ENCOUNTER (OUTPATIENT)
Age: 62
End: 2017-12-26

## 2018-01-16 ENCOUNTER — APPOINTMENT (OUTPATIENT)
Dept: PULMONOLOGY | Facility: CLINIC | Age: 63
End: 2018-01-16
Payer: COMMERCIAL

## 2018-01-16 PROCEDURE — 96372 THER/PROPH/DIAG INJ SC/IM: CPT

## 2018-01-16 RX ORDER — MEPOLIZUMAB 100 MG/ML
100 INJECTION, POWDER, FOR SOLUTION SUBCUTANEOUS
Refills: 0 | Status: COMPLETED | OUTPATIENT
Start: 2018-01-16

## 2018-01-16 RX ADMIN — Medication 0 MG: at 00:00

## 2018-02-01 ENCOUNTER — TRANSCRIPTION ENCOUNTER (OUTPATIENT)
Age: 63
End: 2018-02-01

## 2018-02-16 ENCOUNTER — RX RENEWAL (OUTPATIENT)
Age: 63
End: 2018-02-16

## 2018-02-27 ENCOUNTER — APPOINTMENT (OUTPATIENT)
Dept: PULMONOLOGY | Facility: CLINIC | Age: 63
End: 2018-02-27
Payer: COMMERCIAL

## 2018-02-27 VITALS — SYSTOLIC BLOOD PRESSURE: 120 MMHG | DIASTOLIC BLOOD PRESSURE: 62 MMHG | WEIGHT: 183 LBS | BODY MASS INDEX: 30.45 KG/M2

## 2018-02-27 PROCEDURE — 96372 THER/PROPH/DIAG INJ SC/IM: CPT

## 2018-02-27 RX ORDER — MEPOLIZUMAB 100 MG/ML
100 INJECTION, POWDER, FOR SOLUTION SUBCUTANEOUS
Refills: 0 | Status: COMPLETED | OUTPATIENT
Start: 2018-02-27

## 2018-02-27 RX ADMIN — MEPOLIZUMAB 1 MG: 100 INJECTION, POWDER, FOR SOLUTION SUBCUTANEOUS at 00:00

## 2018-03-27 ENCOUNTER — APPOINTMENT (OUTPATIENT)
Dept: PULMONOLOGY | Facility: CLINIC | Age: 63
End: 2018-03-27
Payer: COMMERCIAL

## 2018-03-27 VITALS — BODY MASS INDEX: 30.62 KG/M2 | WEIGHT: 184 LBS | DIASTOLIC BLOOD PRESSURE: 60 MMHG | SYSTOLIC BLOOD PRESSURE: 116 MMHG

## 2018-03-27 PROCEDURE — 96372 THER/PROPH/DIAG INJ SC/IM: CPT

## 2018-03-27 RX ORDER — MEPOLIZUMAB 100 MG/ML
100 INJECTION, POWDER, FOR SOLUTION SUBCUTANEOUS
Refills: 0 | Status: COMPLETED | OUTPATIENT
Start: 2018-03-27

## 2018-03-27 RX ADMIN — MEPOLIZUMAB 1 MG: 100 INJECTION, POWDER, FOR SOLUTION SUBCUTANEOUS at 00:00

## 2018-04-10 ENCOUNTER — RX RENEWAL (OUTPATIENT)
Age: 63
End: 2018-04-10

## 2018-04-18 ENCOUNTER — RECORD ABSTRACTING (OUTPATIENT)
Age: 63
End: 2018-04-18

## 2018-04-18 DIAGNOSIS — Z78.9 OTHER SPECIFIED HEALTH STATUS: ICD-10-CM

## 2018-04-18 DIAGNOSIS — R01.1 CARDIAC MURMUR, UNSPECIFIED: ICD-10-CM

## 2018-04-19 ENCOUNTER — APPOINTMENT (OUTPATIENT)
Dept: CARDIOLOGY | Facility: CLINIC | Age: 63
End: 2018-04-19
Payer: COMMERCIAL

## 2018-04-19 VITALS
WEIGHT: 188 LBS | DIASTOLIC BLOOD PRESSURE: 65 MMHG | HEIGHT: 72 IN | BODY MASS INDEX: 25.47 KG/M2 | SYSTOLIC BLOOD PRESSURE: 120 MMHG | HEART RATE: 65 BPM | RESPIRATION RATE: 16 BRPM

## 2018-04-19 DIAGNOSIS — Z80.7 FAMILY HISTORY OF OTHER MALIGNANT NEOPLASMS OF LYMPHOID, HEMATOPOIETIC AND RELATED TISSUES: ICD-10-CM

## 2018-04-19 DIAGNOSIS — G47.00 INSOMNIA, UNSPECIFIED: ICD-10-CM

## 2018-04-19 DIAGNOSIS — Z87.19 PERSONAL HISTORY OF OTHER DISEASES OF THE DIGESTIVE SYSTEM: ICD-10-CM

## 2018-04-19 DIAGNOSIS — M19.90 UNSPECIFIED OSTEOARTHRITIS, UNSPECIFIED SITE: ICD-10-CM

## 2018-04-19 DIAGNOSIS — M25.562 PAIN IN LEFT KNEE: ICD-10-CM

## 2018-04-19 DIAGNOSIS — G89.29 PAIN IN LEFT KNEE: ICD-10-CM

## 2018-04-19 DIAGNOSIS — I70.0 ATHEROSCLEROSIS OF AORTA: ICD-10-CM

## 2018-04-19 DIAGNOSIS — Z80.0 FAMILY HISTORY OF MALIGNANT NEOPLASM OF DIGESTIVE ORGANS: ICD-10-CM

## 2018-04-19 PROCEDURE — 93000 ELECTROCARDIOGRAM COMPLETE: CPT

## 2018-04-19 PROCEDURE — 99214 OFFICE O/P EST MOD 30 MIN: CPT

## 2018-04-19 RX ORDER — BENZONATATE 100 MG/1
100 CAPSULE ORAL
Qty: 30 | Refills: 0 | Status: DISCONTINUED | COMMUNITY
Start: 2018-01-11 | End: 2018-04-19

## 2018-04-19 RX ORDER — DOXYCYCLINE HYCLATE 100 MG/1
100 CAPSULE ORAL
Qty: 14 | Refills: 0 | Status: DISCONTINUED | COMMUNITY
Start: 2017-12-26 | End: 2018-04-19

## 2018-04-19 RX ORDER — AZITHROMYCIN 250 MG/1
250 TABLET, FILM COATED ORAL
Qty: 1 | Refills: 0 | Status: DISCONTINUED | COMMUNITY
Start: 2017-12-11 | End: 2018-04-19

## 2018-04-19 RX ORDER — PANTOPRAZOLE 40 MG/1
40 TABLET, DELAYED RELEASE ORAL
Qty: 14 | Refills: 0 | Status: DISCONTINUED | COMMUNITY
Start: 2017-11-15 | End: 2018-04-19

## 2018-04-19 RX ORDER — OSELTAMIVIR PHOSPHATE 75 MG/1
75 CAPSULE ORAL
Qty: 10 | Refills: 0 | Status: DISCONTINUED | COMMUNITY
Start: 2018-02-01 | End: 2018-04-19

## 2018-04-30 ENCOUNTER — APPOINTMENT (OUTPATIENT)
Dept: PULMONOLOGY | Facility: CLINIC | Age: 63
End: 2018-04-30
Payer: COMMERCIAL

## 2018-04-30 VITALS
SYSTOLIC BLOOD PRESSURE: 140 MMHG | HEART RATE: 74 BPM | HEIGHT: 72 IN | WEIGHT: 194 LBS | DIASTOLIC BLOOD PRESSURE: 80 MMHG | OXYGEN SATURATION: 94 % | BODY MASS INDEX: 26.28 KG/M2

## 2018-04-30 PROCEDURE — 94010 BREATHING CAPACITY TEST: CPT

## 2018-04-30 PROCEDURE — 99214 OFFICE O/P EST MOD 30 MIN: CPT | Mod: 25

## 2018-05-07 ENCOUNTER — APPOINTMENT (OUTPATIENT)
Dept: PULMONOLOGY | Facility: CLINIC | Age: 63
End: 2018-05-07
Payer: COMMERCIAL

## 2018-05-07 VITALS — SYSTOLIC BLOOD PRESSURE: 140 MMHG | DIASTOLIC BLOOD PRESSURE: 80 MMHG | BODY MASS INDEX: 25.36 KG/M2 | WEIGHT: 187 LBS

## 2018-05-07 PROCEDURE — 96372 THER/PROPH/DIAG INJ SC/IM: CPT

## 2018-05-07 RX ORDER — MEPOLIZUMAB 100 MG/ML
100 INJECTION, POWDER, FOR SOLUTION SUBCUTANEOUS
Refills: 0 | Status: COMPLETED | OUTPATIENT
Start: 2018-05-07

## 2018-05-07 RX ADMIN — MEPOLIZUMAB 1 MG: 100 INJECTION, POWDER, FOR SOLUTION SUBCUTANEOUS at 00:00

## 2018-05-17 ENCOUNTER — CHART COPY (OUTPATIENT)
Age: 63
End: 2018-05-17

## 2018-05-17 ENCOUNTER — RX RENEWAL (OUTPATIENT)
Age: 63
End: 2018-05-17

## 2018-05-22 ENCOUNTER — APPOINTMENT (OUTPATIENT)
Dept: DERMATOLOGY | Facility: CLINIC | Age: 63
End: 2018-05-22
Payer: COMMERCIAL

## 2018-05-22 DIAGNOSIS — L81.4 OTHER MELANIN HYPERPIGMENTATION: ICD-10-CM

## 2018-05-22 DIAGNOSIS — L98.8 OTHER SPECIFIED DISORDERS OF THE SKIN AND SUBCUTANEOUS TISSUE: ICD-10-CM

## 2018-05-22 DIAGNOSIS — D48.5 NEOPLASM OF UNCERTAIN BEHAVIOR OF SKIN: ICD-10-CM

## 2018-05-22 PROCEDURE — 11101 BIOPSY SKIN SUBQ&/MUCOUS MEMBRANE EA ADDL LESN: CPT

## 2018-05-22 PROCEDURE — 99203 OFFICE O/P NEW LOW 30 MIN: CPT | Mod: 25

## 2018-05-22 PROCEDURE — 11100 BX SKIN SUBCUTANEOUS&/MUCOUS MEMBRANE 1 LESION: CPT

## 2018-05-22 RX ORDER — OXYCODONE 5 MG/1
5 TABLET ORAL
Qty: 100 | Refills: 0 | Status: DISCONTINUED | COMMUNITY
Start: 2017-06-26 | End: 2018-05-22

## 2018-05-30 LAB — CORE LAB BIOPSY: NORMAL

## 2018-06-12 ENCOUNTER — APPOINTMENT (OUTPATIENT)
Dept: PULMONOLOGY | Facility: CLINIC | Age: 63
End: 2018-06-12
Payer: COMMERCIAL

## 2018-06-12 PROCEDURE — 96372 THER/PROPH/DIAG INJ SC/IM: CPT

## 2018-06-12 RX ADMIN — BENRALIZUMAB 0 MG/ML: 30 INJECTION, SOLUTION SUBCUTANEOUS at 00:00

## 2018-06-13 RX ORDER — BENRALIZUMAB 30 MG/ML
30 INJECTION, SOLUTION SUBCUTANEOUS
Refills: 0 | Status: COMPLETED | OUTPATIENT
Start: 2018-06-12

## 2018-07-09 ENCOUNTER — APPOINTMENT (OUTPATIENT)
Dept: DERMATOLOGY | Facility: CLINIC | Age: 63
End: 2018-07-09
Payer: COMMERCIAL

## 2018-07-09 ENCOUNTER — APPOINTMENT (OUTPATIENT)
Dept: CARDIOLOGY | Facility: CLINIC | Age: 63
End: 2018-07-09
Payer: COMMERCIAL

## 2018-07-09 DIAGNOSIS — Z85.828 PERSONAL HISTORY OF OTHER MALIGNANT NEOPLASM OF SKIN: ICD-10-CM

## 2018-07-09 DIAGNOSIS — C44.519 BASAL CELL CARCINOMA OF SKIN OF OTHER PART OF TRUNK: ICD-10-CM

## 2018-07-09 PROCEDURE — 17262 DSTRJ MAL LES T/A/L 1.1-2.0: CPT | Mod: 59

## 2018-07-09 PROCEDURE — 93306 TTE W/DOPPLER COMPLETE: CPT

## 2018-07-10 ENCOUNTER — APPOINTMENT (OUTPATIENT)
Dept: PULMONOLOGY | Facility: CLINIC | Age: 63
End: 2018-07-10
Payer: COMMERCIAL

## 2018-07-10 VITALS — SYSTOLIC BLOOD PRESSURE: 132 MMHG | WEIGHT: 188 LBS | BODY MASS INDEX: 25.5 KG/M2 | DIASTOLIC BLOOD PRESSURE: 76 MMHG

## 2018-07-10 VITALS — WEIGHT: 195 LBS | BODY MASS INDEX: 26.45 KG/M2

## 2018-07-10 PROCEDURE — 96372 THER/PROPH/DIAG INJ SC/IM: CPT

## 2018-07-10 RX ORDER — BENRALIZUMAB 30 MG/ML
30 INJECTION, SOLUTION SUBCUTANEOUS
Refills: 0 | Status: COMPLETED | OUTPATIENT
Start: 2018-07-10

## 2018-07-10 RX ADMIN — BENRALIZUMAB 30 MG/ML: 30 INJECTION, SOLUTION SUBCUTANEOUS at 00:00

## 2018-07-16 PROBLEM — J45.909 UNSPECIFIED ASTHMA, UNCOMPLICATED: Chronic | Status: INACTIVE | Noted: 2017-07-12 | Resolved: 2017-11-02

## 2018-07-19 ENCOUNTER — APPOINTMENT (OUTPATIENT)
Dept: CARDIOLOGY | Facility: CLINIC | Age: 63
End: 2018-07-19

## 2018-07-24 RX ORDER — VALSARTAN 40 MG/1
40 TABLET, COATED ORAL DAILY
Qty: 90 | Refills: 0 | Status: DISCONTINUED | COMMUNITY
Start: 2017-04-06 | End: 2018-07-24

## 2018-07-25 ENCOUNTER — APPOINTMENT (OUTPATIENT)
Dept: CARDIOLOGY | Facility: CLINIC | Age: 63
End: 2018-07-25
Payer: COMMERCIAL

## 2018-07-25 VITALS
SYSTOLIC BLOOD PRESSURE: 120 MMHG | HEART RATE: 63 BPM | DIASTOLIC BLOOD PRESSURE: 65 MMHG | WEIGHT: 196 LBS | BODY MASS INDEX: 26.55 KG/M2 | HEIGHT: 72 IN | RESPIRATION RATE: 18 BRPM

## 2018-07-25 PROBLEM — M51.26 OTHER INTERVERTEBRAL DISC DISPLACEMENT, LUMBAR REGION: Chronic | Status: ACTIVE | Noted: 2017-07-12

## 2018-07-25 PROBLEM — M16.11 UNILATERAL PRIMARY OSTEOARTHRITIS, RIGHT HIP: Chronic | Status: ACTIVE | Noted: 2017-07-12

## 2018-07-25 PROBLEM — Z87.442 PERSONAL HISTORY OF URINARY CALCULI: Chronic | Status: ACTIVE | Noted: 2017-07-12

## 2018-07-25 PROBLEM — H93.13 TINNITUS, BILATERAL: Chronic | Status: ACTIVE | Noted: 2017-07-12

## 2018-07-25 PROBLEM — J45.909 UNSPECIFIED ASTHMA, UNCOMPLICATED: Chronic | Status: ACTIVE | Noted: 2017-11-02

## 2018-07-25 PROBLEM — I34.1 NONRHEUMATIC MITRAL (VALVE) PROLAPSE: Chronic | Status: ACTIVE | Noted: 2017-07-12

## 2018-07-25 PROBLEM — M19.90 UNSPECIFIED OSTEOARTHRITIS, UNSPECIFIED SITE: Chronic | Status: ACTIVE | Noted: 2017-11-02

## 2018-07-25 PROBLEM — K57.90 DIVERTICULOSIS OF INTESTINE, PART UNSPECIFIED, WITHOUT PERFORATION OR ABSCESS WITHOUT BLEEDING: Chronic | Status: ACTIVE | Noted: 2017-07-12

## 2018-07-25 PROBLEM — J30.1 ALLERGIC RHINITIS DUE TO POLLEN: Chronic | Status: ACTIVE | Noted: 2017-07-12

## 2018-07-25 PROBLEM — I10 ESSENTIAL (PRIMARY) HYPERTENSION: Chronic | Status: ACTIVE | Noted: 2017-07-12

## 2018-07-25 PROCEDURE — 99214 OFFICE O/P EST MOD 30 MIN: CPT

## 2018-07-25 PROCEDURE — 93000 ELECTROCARDIOGRAM COMPLETE: CPT

## 2018-08-13 ENCOUNTER — APPOINTMENT (OUTPATIENT)
Dept: PULMONOLOGY | Facility: CLINIC | Age: 63
End: 2018-08-13
Payer: COMMERCIAL

## 2018-08-13 VITALS — SYSTOLIC BLOOD PRESSURE: 142 MMHG | BODY MASS INDEX: 26.58 KG/M2 | WEIGHT: 196 LBS | DIASTOLIC BLOOD PRESSURE: 82 MMHG

## 2018-08-13 PROCEDURE — 96372 THER/PROPH/DIAG INJ SC/IM: CPT

## 2018-08-13 RX ORDER — BENRALIZUMAB 30 MG/ML
30 INJECTION, SOLUTION SUBCUTANEOUS
Refills: 0 | Status: COMPLETED | OUTPATIENT
Start: 2018-08-13

## 2018-08-13 RX ADMIN — BENRALIZUMAB 30 MG/ML: 30 INJECTION, SOLUTION SUBCUTANEOUS at 00:00

## 2018-09-06 ENCOUNTER — APPOINTMENT (OUTPATIENT)
Dept: CARDIOLOGY | Facility: CLINIC | Age: 63
End: 2018-09-06
Payer: COMMERCIAL

## 2018-09-06 PROCEDURE — 93351 STRESS TTE COMPLETE: CPT

## 2018-09-07 ENCOUNTER — RX RENEWAL (OUTPATIENT)
Age: 63
End: 2018-09-07

## 2018-09-10 ENCOUNTER — RX RENEWAL (OUTPATIENT)
Age: 63
End: 2018-09-10

## 2018-09-13 ENCOUNTER — APPOINTMENT (OUTPATIENT)
Dept: PULMONOLOGY | Facility: CLINIC | Age: 63
End: 2018-09-13

## 2018-10-04 ENCOUNTER — APPOINTMENT (OUTPATIENT)
Dept: CARDIOLOGY | Facility: CLINIC | Age: 63
End: 2018-10-04

## 2018-10-09 ENCOUNTER — RX RENEWAL (OUTPATIENT)
Age: 63
End: 2018-10-09

## 2018-10-10 ENCOUNTER — OUTPATIENT (OUTPATIENT)
Dept: OUTPATIENT SERVICES | Facility: HOSPITAL | Age: 63
LOS: 1 days | End: 2018-10-10
Payer: COMMERCIAL

## 2018-10-10 VITALS
HEIGHT: 72 IN | WEIGHT: 192.9 LBS | RESPIRATION RATE: 18 BRPM | OXYGEN SATURATION: 95 % | HEART RATE: 64 BPM | DIASTOLIC BLOOD PRESSURE: 84 MMHG | TEMPERATURE: 98 F | SYSTOLIC BLOOD PRESSURE: 135 MMHG

## 2018-10-10 VITALS — WEIGHT: 192.9 LBS | HEIGHT: 72 IN

## 2018-10-10 DIAGNOSIS — Z96.641 PRESENCE OF RIGHT ARTIFICIAL HIP JOINT: Chronic | ICD-10-CM

## 2018-10-10 DIAGNOSIS — Z01.810 ENCOUNTER FOR PREPROCEDURAL CARDIOVASCULAR EXAMINATION: ICD-10-CM

## 2018-10-10 DIAGNOSIS — Z98.890 OTHER SPECIFIED POSTPROCEDURAL STATES: Chronic | ICD-10-CM

## 2018-10-10 DIAGNOSIS — Z96.642 PRESENCE OF LEFT ARTIFICIAL HIP JOINT: Chronic | ICD-10-CM

## 2018-10-10 DIAGNOSIS — I38 ENDOCARDITIS, VALVE UNSPECIFIED: ICD-10-CM

## 2018-10-10 LAB
ANION GAP SERPL CALC-SCNC: 12 MMOL/L — SIGNIFICANT CHANGE UP (ref 5–17)
APTT BLD: 36.4 SEC — SIGNIFICANT CHANGE UP (ref 27.5–37.4)
BASOPHILS # BLD AUTO: 0 K/UL — SIGNIFICANT CHANGE UP (ref 0–0.2)
BASOPHILS NFR BLD AUTO: 0.2 % — SIGNIFICANT CHANGE UP (ref 0–2)
BUN SERPL-MCNC: 16 MG/DL — SIGNIFICANT CHANGE UP (ref 8–20)
CALCIUM SERPL-MCNC: 9.9 MG/DL — SIGNIFICANT CHANGE UP (ref 8.6–10.2)
CHLORIDE SERPL-SCNC: 103 MMOL/L — SIGNIFICANT CHANGE UP (ref 98–107)
CO2 SERPL-SCNC: 26 MMOL/L — SIGNIFICANT CHANGE UP (ref 22–29)
CREAT SERPL-MCNC: 0.68 MG/DL — SIGNIFICANT CHANGE UP (ref 0.5–1.3)
EOSINOPHIL # BLD AUTO: 0 K/UL — SIGNIFICANT CHANGE UP (ref 0–0.5)
EOSINOPHIL NFR BLD AUTO: 0 % — SIGNIFICANT CHANGE UP (ref 0–6)
GLUCOSE SERPL-MCNC: 103 MG/DL — SIGNIFICANT CHANGE UP (ref 70–115)
HCT VFR BLD CALC: 43.1 % — SIGNIFICANT CHANGE UP (ref 42–52)
HGB BLD-MCNC: 13.7 G/DL — LOW (ref 14–18)
INR BLD: 1.07 RATIO — SIGNIFICANT CHANGE UP (ref 0.88–1.16)
LYMPHOCYTES # BLD AUTO: 0.8 K/UL — LOW (ref 1–4.8)
LYMPHOCYTES # BLD AUTO: 12.8 % — LOW (ref 20–55)
MAGNESIUM SERPL-MCNC: 2.2 MG/DL — SIGNIFICANT CHANGE UP (ref 1.6–2.6)
MCHC RBC-ENTMCNC: 29.6 PG — SIGNIFICANT CHANGE UP (ref 27–31)
MCHC RBC-ENTMCNC: 31.8 G/DL — LOW (ref 32–36)
MCV RBC AUTO: 93.1 FL — SIGNIFICANT CHANGE UP (ref 80–94)
MONOCYTES # BLD AUTO: 0.7 K/UL — SIGNIFICANT CHANGE UP (ref 0–0.8)
MONOCYTES NFR BLD AUTO: 12.4 % — HIGH (ref 3–10)
NEUTROPHILS # BLD AUTO: 4.4 K/UL — SIGNIFICANT CHANGE UP (ref 1.8–8)
NEUTROPHILS NFR BLD AUTO: 74.4 % — HIGH (ref 37–73)
PLATELET # BLD AUTO: 204 K/UL — SIGNIFICANT CHANGE UP (ref 150–400)
POTASSIUM SERPL-MCNC: 4 MMOL/L — SIGNIFICANT CHANGE UP (ref 3.5–5.3)
POTASSIUM SERPL-SCNC: 4 MMOL/L — SIGNIFICANT CHANGE UP (ref 3.5–5.3)
PROTHROM AB SERPL-ACNC: 11.8 SEC — SIGNIFICANT CHANGE UP (ref 9.8–12.7)
RBC # BLD: 4.63 M/UL — SIGNIFICANT CHANGE UP (ref 4.6–6.2)
RBC # FLD: 14.7 % — SIGNIFICANT CHANGE UP (ref 11–15.6)
SODIUM SERPL-SCNC: 141 MMOL/L — SIGNIFICANT CHANGE UP (ref 135–145)
WBC # BLD: 6 K/UL — SIGNIFICANT CHANGE UP (ref 4.8–10.8)
WBC # FLD AUTO: 6 K/UL — SIGNIFICANT CHANGE UP (ref 4.8–10.8)

## 2018-10-10 PROCEDURE — 85027 COMPLETE CBC AUTOMATED: CPT

## 2018-10-10 PROCEDURE — 83735 ASSAY OF MAGNESIUM: CPT

## 2018-10-10 PROCEDURE — 85610 PROTHROMBIN TIME: CPT

## 2018-10-10 PROCEDURE — 36415 COLL VENOUS BLD VENIPUNCTURE: CPT

## 2018-10-10 PROCEDURE — G0463: CPT

## 2018-10-10 PROCEDURE — 93010 ELECTROCARDIOGRAM REPORT: CPT

## 2018-10-10 PROCEDURE — 80048 BASIC METABOLIC PNL TOTAL CA: CPT

## 2018-10-10 PROCEDURE — 85730 THROMBOPLASTIN TIME PARTIAL: CPT

## 2018-10-10 PROCEDURE — 93005 ELECTROCARDIOGRAM TRACING: CPT

## 2018-10-10 NOTE — H&P PST ADULT - NEGATIVE NEUROLOGICAL SYMPTOMS
no transient paralysis/no paresthesias/no vertigo/no focal seizures/no syncope/no weakness/no generalized seizures

## 2018-10-10 NOTE — H&P PST ADULT - ASSESSMENT
This is a 62 yo white male with a PMHs of HTN, HLD, past tobacco use, bilateral inguinal hernia repairs, bilateral hip replacements, arthroscopy right  shoulder surgery, mitral regurgitation, and asthma. Patient presents for [facility] as preoperative clearance for MV replacement 11/27/18 at [facility]. This is a 62 yo white male with a PMHs of HTN, HLD, past tobacco use, bilateral inguinal hernia repairs, bilateral hip replacements, arthroscopy right  shoulder surgery, mitral regurgitation, and asthma. Patient presents for [place] as preoperative clearance for MV replacement 11/27/18 at [facility].      Plan Left and Right heart cath 10/15/2018 0645  NPO p mn 10/15  Take all meds as usual

## 2018-10-10 NOTE — H&P PST ADULT - HISTORY OF PRESENT ILLNESS
This is a 62 yo white male with a PMHs of HTN, HLD, past tobacco use, bilateral inguinal hernia repairs, bilateral hip replacements, arthroscopy right  shoulder surgery, mitral regurgitation, and asthma. Patient presents for [facility] as preoperative clearance for MV replacement 11/27/18 at [facility]. This is a 62 yo white male with a PMHs of HTN, HLD, past tobacco use, bilateral inguinal hernia repairs, bilateral hip replacements, arthroscopy right  shoulder surgery, mitral regurgitation, and asthma. Patient presents for [facility] as preoperative clearance for MV replacement 11/27/18 at [facility].    Stress Echo 9/6/2018: Submaximal stress echo, normal global LV systolic function, Moderate mitral regurgitation at rest, moderate-severe mitral regurgitation at maximal stress, mild tricuspid regurgitation at rest and stress, RV systolic pressure was 46.7 and at stress was 66.2, EKG negative for ischemia, there were PVC's and PAC's during exercise, BP was normal, patients end point was leg fatigue. Duke treadmill score 6.    ECHO 7/25/2018:  Normal LV size, thickness and function; EF 60-65%, normal RV sixe and function, severely dilated LA, RA normal in size and structure, severe mitral valve prolapse, severe anteriorly eccentric mitral valve regurgitation, mild mitral annular calcification, mild aorti valve sclerosis without stenosis, trace aortic regurgitation, mild dilatation of the ascending aorta.

## 2018-10-10 NOTE — ASU PATIENT PROFILE, ADULT - PMH
Asthma    Diverticulosis of intestine without bleeding, unspecified intestinal tract location    Essential hypertension    Fatty liver    Hiatal hernia    History of kidney stones    Lumbar herniated disc    MVP (mitral valve prolapse)    Nasal polyps  history of  Osteoarthritis  left hip  Osteoarthritis of right hip, unspecified osteoarthritis type    Seasonal allergic rhinitis due to pollen    Tinnitus of both ears

## 2018-10-10 NOTE — ASU PATIENT PROFILE, ADULT - PSH
H/O arthroscopy of shoulder  left 2/ 2016  H/O hernia repair  11/3/2016  H/O lithotripsy  2015  H/O sinus surgery  x 3  History of hip replacement, total, right  07/2017  History of total hip replacement, left

## 2018-10-11 ENCOUNTER — APPOINTMENT (OUTPATIENT)
Dept: PULMONOLOGY | Facility: CLINIC | Age: 63
End: 2018-10-11
Payer: COMMERCIAL

## 2018-10-11 VITALS — DIASTOLIC BLOOD PRESSURE: 76 MMHG | SYSTOLIC BLOOD PRESSURE: 118 MMHG | OXYGEN SATURATION: 97 % | HEART RATE: 68 BPM

## 2018-10-11 VITALS — BODY MASS INDEX: 26.58 KG/M2 | WEIGHT: 196 LBS

## 2018-10-11 PROCEDURE — 94060 EVALUATION OF WHEEZING: CPT

## 2018-10-11 PROCEDURE — 94664 DEMO&/EVAL PT USE INHALER: CPT | Mod: 59

## 2018-10-11 PROCEDURE — 99215 OFFICE O/P EST HI 40 MIN: CPT | Mod: 25

## 2018-10-11 RX ORDER — PREDNISONE 10 MG/1
10 TABLET ORAL
Qty: 30 | Refills: 1 | Status: DISCONTINUED | COMMUNITY
Start: 2017-09-29 | End: 2018-10-11

## 2018-10-15 ENCOUNTER — APPOINTMENT (OUTPATIENT)
Dept: PULMONOLOGY | Facility: CLINIC | Age: 63
End: 2018-10-15
Payer: COMMERCIAL

## 2018-10-15 VITALS — DIASTOLIC BLOOD PRESSURE: 80 MMHG | SYSTOLIC BLOOD PRESSURE: 124 MMHG

## 2018-10-15 PROBLEM — K44.9 DIAPHRAGMATIC HERNIA WITHOUT OBSTRUCTION OR GANGRENE: Chronic | Status: ACTIVE | Noted: 2018-10-10

## 2018-10-15 PROBLEM — K76.0 FATTY (CHANGE OF) LIVER, NOT ELSEWHERE CLASSIFIED: Chronic | Status: ACTIVE | Noted: 2018-10-10

## 2018-10-15 PROCEDURE — 96372 THER/PROPH/DIAG INJ SC/IM: CPT

## 2018-10-15 RX ADMIN — BENRALIZUMAB 0 MG/ML: 30 INJECTION, SOLUTION SUBCUTANEOUS at 00:00

## 2018-10-16 ENCOUNTER — TRANSCRIPTION ENCOUNTER (OUTPATIENT)
Age: 63
End: 2018-10-16

## 2018-10-16 ENCOUNTER — OUTPATIENT (OUTPATIENT)
Dept: OUTPATIENT SERVICES | Facility: HOSPITAL | Age: 63
LOS: 1 days | Discharge: HOME | End: 2018-10-16
Payer: COMMERCIAL

## 2018-10-16 VITALS
OXYGEN SATURATION: 96 % | SYSTOLIC BLOOD PRESSURE: 122 MMHG | HEART RATE: 53 BPM | DIASTOLIC BLOOD PRESSURE: 74 MMHG | RESPIRATION RATE: 14 BRPM

## 2018-10-16 VITALS
RESPIRATION RATE: 16 BRPM | HEART RATE: 62 BPM | SYSTOLIC BLOOD PRESSURE: 115 MMHG | OXYGEN SATURATION: 97 % | DIASTOLIC BLOOD PRESSURE: 66 MMHG

## 2018-10-16 DIAGNOSIS — Z98.890 OTHER SPECIFIED POSTPROCEDURAL STATES: Chronic | ICD-10-CM

## 2018-10-16 DIAGNOSIS — Z01.810 ENCOUNTER FOR PREPROCEDURAL CARDIOVASCULAR EXAMINATION: ICD-10-CM

## 2018-10-16 DIAGNOSIS — Z96.642 PRESENCE OF LEFT ARTIFICIAL HIP JOINT: Chronic | ICD-10-CM

## 2018-10-16 DIAGNOSIS — Z96.641 PRESENCE OF RIGHT ARTIFICIAL HIP JOINT: Chronic | ICD-10-CM

## 2018-10-16 DIAGNOSIS — I38 ENDOCARDITIS, VALVE UNSPECIFIED: ICD-10-CM

## 2018-10-16 PROCEDURE — 99152 MOD SED SAME PHYS/QHP 5/>YRS: CPT

## 2018-10-16 PROCEDURE — C1769: CPT

## 2018-10-16 PROCEDURE — C1889: CPT

## 2018-10-16 PROCEDURE — C1894: CPT

## 2018-10-16 PROCEDURE — C1887: CPT

## 2018-10-16 PROCEDURE — 76937 US GUIDE VASCULAR ACCESS: CPT

## 2018-10-16 PROCEDURE — 93460 R&L HRT ART/VENTRICLE ANGIO: CPT | Mod: 26

## 2018-10-16 PROCEDURE — 76937 US GUIDE VASCULAR ACCESS: CPT | Mod: 26

## 2018-10-16 PROCEDURE — 99153 MOD SED SAME PHYS/QHP EA: CPT

## 2018-10-16 PROCEDURE — 93460 R&L HRT ART/VENTRICLE ANGIO: CPT

## 2018-10-16 RX ORDER — FENTANYL CITRATE 50 UG/ML
25 INJECTION INTRAVENOUS ONCE
Qty: 0 | Refills: 0 | Status: DISCONTINUED | OUTPATIENT
Start: 2018-10-16 | End: 2018-10-31

## 2018-10-16 RX ORDER — ACETAMINOPHEN 500 MG
1000 TABLET ORAL ONCE
Qty: 0 | Refills: 0 | Status: DISCONTINUED | OUTPATIENT
Start: 2018-10-16 | End: 2018-10-31

## 2018-10-16 RX ORDER — BENRALIZUMAB 30 MG/ML
30 INJECTION, SOLUTION SUBCUTANEOUS
Refills: 0 | Status: COMPLETED | OUTPATIENT
Start: 2018-10-15

## 2018-10-16 RX ORDER — SODIUM CHLORIDE 9 MG/ML
1000 INJECTION INTRAMUSCULAR; INTRAVENOUS; SUBCUTANEOUS
Qty: 0 | Refills: 0 | Status: DISCONTINUED | OUTPATIENT
Start: 2018-10-16 | End: 2018-10-31

## 2018-10-16 NOTE — DISCHARGE NOTE ADULT - CARE PROVIDER_API CALL
Raúl Byers (MD), Cardiovascular Disease  [address] [place] [address]  Phone: [phone]  Fax: [phone]

## 2018-10-16 NOTE — DISCHARGE NOTE ADULT - PLAN OF CARE
ADl without CP No heavy lifting, driving, sex, tub baths, swimming, or any activity that submerges the lower half of the body in water for 48 hours.  Limited walking and stairs for 48 hours.    Change the bandaid after 24 hours and every 24 hours after that.  Keep the puncture site dry and covered with a bandaid until a scab forms.    Observe the site frequently.  If bleeding or a large lump (the size of a golf ball or bigger) occurs lie flat, apply continuous direct pressure just above the puncture site for at least 10 minutes, and notify your physician immediately.  If the bleeding cannot be controlled, call 911 immediately for assistance.  Notify your physician of pain, swelling or any drainage.    Notify your physician immediately if coldness, numbness, discoloration or pain in your foot occurs.

## 2018-10-16 NOTE — DISCHARGE NOTE ADULT - NS AS ACTIVITY OBS
Walking-Outdoors allowed/Showering allowed/No Heavy lifting/straining/Sex allowed/Stairs allowed/Driving allowed/Walking-Indoors allowed/Do not make important decisions

## 2018-10-16 NOTE — DISCHARGE NOTE ADULT - MEDICATION SUMMARY - MEDICATIONS TO TAKE
I will START or STAY ON the medications listed below when I get home from the hospital:    oxyCODONE 5 mg oral tablet  -- 1 tab(s) by mouth every 3 hours, As needed, Mild Pain. Contiue home regimen as prescribed by your pain management doctor.  -- Indication: For pain    Aspirin Enteric Coated 325 mg oral delayed release tablet  -- 1 tab(s) by mouth 2 times a day MDD:2, blood clot prevention  -- Swallow whole.  Do not crush.  Take with food or milk.    -- Indication: For CAD    Cozaar 25 mg oral tablet  -- 1 tab(s) by mouth once a day  -- Indication: For HTN    Bystolic 2.5 mg oral tablet  -- 1 tab(s) by mouth once a day  -- Indication: For HTN    Advair Diskus 500 mcg-50 mcg inhalation powder  -- 1 puff(s) inhaled once a day  -- Indication: For ASTHMA    Ventolin HFA 90 mcg/inh inhalation aerosol  -- 2 puff(s) inhaled 4 times a day, As Needed  -- Indication: For ASTHMA    hydroCHLOROthiazide 12.5 mg oral capsule  -- 1 cap(s) by mouth once a day  -- Indication: For Diuretic    Garlic Oil oral capsule  -- 1 cap(s) by mouth once a day  -- Indication: For Supplement    Fasenra 30 mg/mL subcutaneous solution  -- 1 milliliter(s) subcutaneous every 2 months  -- Indication: For Immunosuppresed    montelukast 10 mg oral tablet  -- 1 tab(s) by mouth once a day  -- Indication: For Allegies    multivitamin  -- 1 cap(s) by mouth once a day  -- Indication: For Supplement

## 2018-10-16 NOTE — PROGRESS NOTE ADULT - SUBJECTIVE AND OBJECTIVE BOX
Pre Cath Note    64 yo white male with a PMHs of HTN, HLD, past tobacco use, bilateral inguinal hernia repairs, bilateral hip replacements, arthroscopy right  shoulder surgery, mitral regurgitation, and asthma. Patient presents for [facility] as preoperative clearance for MV replacement 11/27/18 at [facility].      Planned Procedure: LHC/RHC    Pertinent Pre-procedure Medications:          Pre-Procedural Orders:     ASA: 2  Mallampati: 2          No Known Allergies    Intolerances        PAST MEDICAL & SURGICAL HISTORY:  Fatty liver  Hiatal hernia  Asthma  Osteoarthritis: left hip  Nasal polyps: history of  Seasonal allergic rhinitis due to pollen  Lumbar herniated disc  Osteoarthritis of right hip, unspecified osteoarthritis type  History of kidney stones  Diverticulosis of intestine without bleeding, unspecified intestinal tract location  MVP (mitral valve prolapse)  Essential hypertension  Tinnitus of both ears  History of total hip replacement, left  History of hip replacement, total, right: 07/2017  H/O sinus surgery: x 3  H/O arthroscopy of shoulder: left 2/ 2016  H/O lithotripsy: 2015  H/O hernia repair: 11/3/2016                      Informed consent to be obtained by interventionalist

## 2018-10-16 NOTE — DISCHARGE NOTE ADULT - CARE PLAN
Principal Discharge DX:	Abnormal stress echocardiogram  Goal:	ADl without CP  Assessment and plan of treatment:	No heavy lifting, driving, sex, tub baths, swimming, or any activity that submerges the lower half of the body in water for 48 hours.  Limited walking and stairs for 48 hours.    Change the bandaid after 24 hours and every 24 hours after that.  Keep the puncture site dry and covered with a bandaid until a scab forms.    Observe the site frequently.  If bleeding or a large lump (the size of a golf ball or bigger) occurs lie flat, apply continuous direct pressure just above the puncture site for at least 10 minutes, and notify your physician immediately.  If the bleeding cannot be controlled, call 911 immediately for assistance.  Notify your physician of pain, swelling or any drainage.    Notify your physician immediately if coldness, numbness, discoloration or pain in your foot occurs.

## 2018-10-16 NOTE — DISCHARGE NOTE ADULT - HOSPITAL COURSE
This is a 64 yo white male with a PMHs of HTN, HLD, past tobacco use, bilateral inguinal hernia repairs, bilateral hip replacements, arthroscopy right  shoulder surgery, mitral regurgitation, and asthma. Patient presents for [facility] as preoperative clearance for MV replacement 11/27/18 at [facility].  Abnormal stress echo  Cath showed normal cors  Chronic backpain medicated and tolerated well  OOB ambulating  OK to D/C home.  Follow up with Dr Byers as outpatient in 7-10 days.

## 2018-10-18 ENCOUNTER — APPOINTMENT (OUTPATIENT)
Dept: CARDIOLOGY | Facility: CLINIC | Age: 63
End: 2018-10-18

## 2018-10-19 DIAGNOSIS — I08.3 COMBINED RHEUMATIC DISORDERS OF MITRAL, AORTIC AND TRICUSPID VALVES: ICD-10-CM

## 2018-11-01 ENCOUNTER — APPOINTMENT (OUTPATIENT)
Dept: CARDIOLOGY | Facility: CLINIC | Age: 63
End: 2018-11-01

## 2018-12-03 ENCOUNTER — MEDICATION RENEWAL (OUTPATIENT)
Age: 63
End: 2018-12-03

## 2018-12-04 ENCOUNTER — EMERGENCY (EMERGENCY)
Facility: HOSPITAL | Age: 63
LOS: 0 days | Discharge: HOME | End: 2018-12-04
Attending: EMERGENCY MEDICINE | Admitting: EMERGENCY MEDICINE
Payer: COMMERCIAL

## 2018-12-04 VITALS
DIASTOLIC BLOOD PRESSURE: 79 MMHG | SYSTOLIC BLOOD PRESSURE: 148 MMHG | HEART RATE: 87 BPM | OXYGEN SATURATION: 94 % | RESPIRATION RATE: 19 BRPM

## 2018-12-04 VITALS
TEMPERATURE: 98 F | HEART RATE: 114 BPM | RESPIRATION RATE: 20 BRPM | DIASTOLIC BLOOD PRESSURE: 98 MMHG | OXYGEN SATURATION: 96 % | HEIGHT: 72 IN | WEIGHT: 179.9 LBS | SYSTOLIC BLOOD PRESSURE: 153 MMHG

## 2018-12-04 DIAGNOSIS — Z96.641 PRESENCE OF RIGHT ARTIFICIAL HIP JOINT: Chronic | ICD-10-CM

## 2018-12-04 DIAGNOSIS — Z98.890 OTHER SPECIFIED POSTPROCEDURAL STATES: Chronic | ICD-10-CM

## 2018-12-04 DIAGNOSIS — Z87.19 PERSONAL HISTORY OF OTHER DISEASES OF THE DIGESTIVE SYSTEM: ICD-10-CM

## 2018-12-04 DIAGNOSIS — Z95.2 PRESENCE OF PROSTHETIC HEART VALVE: ICD-10-CM

## 2018-12-04 DIAGNOSIS — Z96.643 PRESENCE OF ARTIFICIAL HIP JOINT, BILATERAL: ICD-10-CM

## 2018-12-04 DIAGNOSIS — M16.11 UNILATERAL PRIMARY OSTEOARTHRITIS, RIGHT HIP: ICD-10-CM

## 2018-12-04 DIAGNOSIS — J45.909 UNSPECIFIED ASTHMA, UNCOMPLICATED: ICD-10-CM

## 2018-12-04 DIAGNOSIS — Z79.82 LONG TERM (CURRENT) USE OF ASPIRIN: ICD-10-CM

## 2018-12-04 DIAGNOSIS — R07.9 CHEST PAIN, UNSPECIFIED: ICD-10-CM

## 2018-12-04 DIAGNOSIS — Z98.890 OTHER SPECIFIED POSTPROCEDURAL STATES: ICD-10-CM

## 2018-12-04 DIAGNOSIS — J30.1 ALLERGIC RHINITIS DUE TO POLLEN: ICD-10-CM

## 2018-12-04 DIAGNOSIS — R07.81 PLEURODYNIA: ICD-10-CM

## 2018-12-04 DIAGNOSIS — R94.31 ABNORMAL ELECTROCARDIOGRAM [ECG] [EKG]: ICD-10-CM

## 2018-12-04 DIAGNOSIS — Z96.642 PRESENCE OF LEFT ARTIFICIAL HIP JOINT: Chronic | ICD-10-CM

## 2018-12-04 DIAGNOSIS — Z87.442 PERSONAL HISTORY OF URINARY CALCULI: ICD-10-CM

## 2018-12-04 LAB
ALBUMIN SERPL ELPH-MCNC: 3.6 G/DL — SIGNIFICANT CHANGE UP (ref 3.3–5)
ALP SERPL-CCNC: 86 U/L — SIGNIFICANT CHANGE UP (ref 40–120)
ALT FLD-CCNC: 84 U/L — HIGH (ref 12–78)
ANION GAP SERPL CALC-SCNC: 10 MMOL/L — SIGNIFICANT CHANGE UP (ref 5–17)
APTT BLD: 26 SEC — LOW (ref 27.5–36.3)
AST SERPL-CCNC: 43 U/L — HIGH (ref 15–37)
BILIRUB SERPL-MCNC: 0.4 MG/DL — SIGNIFICANT CHANGE UP (ref 0.2–1.2)
BUN SERPL-MCNC: 19 MG/DL — SIGNIFICANT CHANGE UP (ref 7–23)
CALCIUM SERPL-MCNC: 8.9 MG/DL — SIGNIFICANT CHANGE UP (ref 8.5–10.1)
CHLORIDE SERPL-SCNC: 105 MMOL/L — SIGNIFICANT CHANGE UP (ref 96–108)
CO2 SERPL-SCNC: 24 MMOL/L — SIGNIFICANT CHANGE UP (ref 22–31)
CREAT SERPL-MCNC: 0.97 MG/DL — SIGNIFICANT CHANGE UP (ref 0.5–1.3)
GLUCOSE SERPL-MCNC: 148 MG/DL — HIGH (ref 70–99)
HCT VFR BLD CALC: 38.1 % — LOW (ref 39–50)
HGB BLD-MCNC: 12 G/DL — LOW (ref 13–17)
INR BLD: 0.97 RATIO — SIGNIFICANT CHANGE UP (ref 0.88–1.16)
MAGNESIUM SERPL-MCNC: 2.3 MG/DL — SIGNIFICANT CHANGE UP (ref 1.6–2.6)
MCHC RBC-ENTMCNC: 30.5 PG — SIGNIFICANT CHANGE UP (ref 27–34)
MCHC RBC-ENTMCNC: 31.5 GM/DL — LOW (ref 32–36)
MCV RBC AUTO: 96.9 FL — SIGNIFICANT CHANGE UP (ref 80–100)
NRBC # BLD: 0 /100 WBCS — SIGNIFICANT CHANGE UP (ref 0–0)
PLATELET # BLD AUTO: 357 K/UL — SIGNIFICANT CHANGE UP (ref 150–400)
POTASSIUM SERPL-MCNC: 4.6 MMOL/L — SIGNIFICANT CHANGE UP (ref 3.5–5.3)
POTASSIUM SERPL-SCNC: 4.6 MMOL/L — SIGNIFICANT CHANGE UP (ref 3.5–5.3)
PROT SERPL-MCNC: 7.9 GM/DL — SIGNIFICANT CHANGE UP (ref 6–8.3)
PROTHROM AB SERPL-ACNC: 10.7 SEC — SIGNIFICANT CHANGE UP (ref 10–12.9)
RBC # BLD: 3.93 M/UL — LOW (ref 4.2–5.8)
RBC # FLD: 14.3 % — SIGNIFICANT CHANGE UP (ref 10.3–14.5)
SODIUM SERPL-SCNC: 139 MMOL/L — SIGNIFICANT CHANGE UP (ref 135–145)
TROPONIN I SERPL-MCNC: 0.17 NG/ML — HIGH (ref 0.01–0.04)
TROPONIN I SERPL-MCNC: 0.2 NG/ML — HIGH (ref 0.01–0.04)
WBC # BLD: 15.4 K/UL — HIGH (ref 3.8–10.5)
WBC # FLD AUTO: 15.4 K/UL — HIGH (ref 3.8–10.5)

## 2018-12-04 PROCEDURE — 74174 CTA ABD&PLVS W/CONTRAST: CPT | Mod: 26

## 2018-12-04 PROCEDURE — 93010 ELECTROCARDIOGRAM REPORT: CPT

## 2018-12-04 PROCEDURE — 71045 X-RAY EXAM CHEST 1 VIEW: CPT | Mod: 26

## 2018-12-04 PROCEDURE — 99285 EMERGENCY DEPT VISIT HI MDM: CPT | Mod: 25

## 2018-12-04 PROCEDURE — 71275 CT ANGIOGRAPHY CHEST: CPT | Mod: 26

## 2018-12-04 RX ORDER — HYDROMORPHONE HYDROCHLORIDE 2 MG/ML
1 INJECTION INTRAMUSCULAR; INTRAVENOUS; SUBCUTANEOUS ONCE
Qty: 0 | Refills: 0 | Status: DISCONTINUED | OUTPATIENT
Start: 2018-12-04 | End: 2018-12-04

## 2018-12-04 RX ORDER — SODIUM CHLORIDE 9 MG/ML
1000 INJECTION INTRAMUSCULAR; INTRAVENOUS; SUBCUTANEOUS ONCE
Qty: 0 | Refills: 0 | Status: COMPLETED | OUTPATIENT
Start: 2018-12-04 | End: 2018-12-04

## 2018-12-04 RX ORDER — ACETAMINOPHEN 500 MG
1000 TABLET ORAL ONCE
Qty: 0 | Refills: 0 | Status: COMPLETED | OUTPATIENT
Start: 2018-12-04 | End: 2018-12-04

## 2018-12-04 RX ADMIN — HYDROMORPHONE HYDROCHLORIDE 1 MILLIGRAM(S): 2 INJECTION INTRAMUSCULAR; INTRAVENOUS; SUBCUTANEOUS at 03:30

## 2018-12-04 RX ADMIN — Medication 1000 MILLIGRAM(S): at 03:12

## 2018-12-04 RX ADMIN — Medication 40 MILLIGRAM(S): at 06:19

## 2018-12-04 RX ADMIN — Medication 400 MILLIGRAM(S): at 02:04

## 2018-12-04 RX ADMIN — Medication 1000 MILLIGRAM(S): at 02:19

## 2018-12-04 RX ADMIN — HYDROMORPHONE HYDROCHLORIDE 1 MILLIGRAM(S): 2 INJECTION INTRAMUSCULAR; INTRAVENOUS; SUBCUTANEOUS at 01:57

## 2018-12-04 RX ADMIN — HYDROMORPHONE HYDROCHLORIDE 1 MILLIGRAM(S): 2 INJECTION INTRAMUSCULAR; INTRAVENOUS; SUBCUTANEOUS at 01:22

## 2018-12-04 RX ADMIN — SODIUM CHLORIDE 1000 MILLILITER(S): 9 INJECTION INTRAMUSCULAR; INTRAVENOUS; SUBCUTANEOUS at 03:30

## 2018-12-04 RX ADMIN — HYDROMORPHONE HYDROCHLORIDE 1 MILLIGRAM(S): 2 INJECTION INTRAMUSCULAR; INTRAVENOUS; SUBCUTANEOUS at 04:00

## 2018-12-04 NOTE — ED ADULT NURSE NOTE - CAS ELECT INFOMATION PROVIDED
DC instructions/pt instructed to f/u with [facility] clinic per ED attending and Pt's surgeon

## 2018-12-04 NOTE — ED ADULT NURSE NOTE - NSIMPLEMENTINTERV_GEN_ALL_ED
Implemented All Fall with Harm Risk Interventions:  [place] to call system. Call bell, personal items and telephone within reach. Instruct patient to call for assistance. Room bathroom lighting operational. Non-slip footwear when patient is off stretcher. Physically safe environment: no spills, clutter or unnecessary equipment. Stretcher in lowest position, wheels locked, appropriate side rails in place. Provide visual cue, wrist band, yellow gown, etc. Monitor gait and stability. Monitor for mental status changes and reorient to person, place, and time. Review medications for side effects contributing to fall risk. Reinforce activity limits and safety measures with patient and family. Provide visual clues: red socks.

## 2018-12-04 NOTE — ED ADULT NURSE REASSESSMENT NOTE - NS ED NURSE REASSESS COMMENT FT1
dressing over old R sided chest tube site changed. small amount of serosanguinous drainage noted. no pus or foul order, area is nontender to touch. warm to touch. pt changed from NRB to 5L/min NC supplemental O2.

## 2018-12-04 NOTE — ED PROVIDER NOTE - MUSCULOSKELETAL, MLM
Spine appears normal, range of motion is not limited,right lateral chest wall and acw with sutures intact slight drainage bloody from right lateral chest wall incision

## 2018-12-04 NOTE — ED PROVIDER NOTE - PROGRESS NOTE DETAILS
no pain second troponin less than first but still elevated called ct surgeon [phone], Dr. Coleman, as per service Dr. Torrez to call back. spoke to joel cardiothoracic pa, he will call Dr. Rowe and get back to us ARTUR Jacobs DO spoke with joel the ct pa who spoke with Dr. Coleman, give pt 40 solumedrol iv and d/c tohome with follow up in his office today. pt without pain and agreeable to this plan.

## 2018-12-04 NOTE — ED PROVIDER NOTE - OBJECTIVE STATEMENT
62 yo male biba from home with severe, sudden onset right sided chest pain with dypsnea. Pt had mitral valve replacement last week at [facility]. 64 yo male biba from home with severe, sudden onset right sided chest pain with dypsnea. Pt had mitral valve replacement last week at [facility]. 11/27/18 with Dr. Coleman

## 2018-12-04 NOTE — ED ADULT NURSE NOTE - CHIEF COMPLAINT QUOTE
incision on right chest for mitral valve replacement Thursday with Dr. Levin @ [facility]. Reports he was standing out of bed when pain suddenly increased.

## 2018-12-04 NOTE — ED ADULT NURSE REASSESSMENT NOTE - NS ED NURSE REASSESS COMMENT FT1
change of shift report received from YESSY Bowden. pt is A&Ox3, currently receiving pain medication. pt vital signs as charted, tachycardic/tachypneic. will continue to monitor.

## 2018-12-04 NOTE — ED ADULT NURSE NOTE - OBJECTIVE STATEMENT
Patient complaining of pain at incision site s/p mitral valve replacement. Patient rates pain at 10/10. Patient with tachycardia and tachypnea.

## 2018-12-04 NOTE — ED PROVIDER NOTE - MEDICAL DECISION MAKING DETAILS
chest pain with pleuritic pain s/p mvr surgery will get cardiac labs, ekg, give pain medication chest xray r/o pneumo and cta r/o pe and dissection

## 2018-12-04 NOTE — ED ADULT NURSE NOTE - NSFALLRSKHRMRISKTYPE_ED_ALL_ED
coagulation(Bleeding disorder R/T clinical cond/anti-coags)/surgery(72hr postop, recent leg amputee, sig. abd/thor surg)

## 2018-12-14 ENCOUNTER — APPOINTMENT (OUTPATIENT)
Dept: CARDIOLOGY | Facility: CLINIC | Age: 63
End: 2018-12-14
Payer: COMMERCIAL

## 2018-12-14 VITALS
RESPIRATION RATE: 16 BRPM | DIASTOLIC BLOOD PRESSURE: 80 MMHG | WEIGHT: 187 LBS | SYSTOLIC BLOOD PRESSURE: 140 MMHG | HEART RATE: 83 BPM | BODY MASS INDEX: 25.33 KG/M2 | HEIGHT: 72 IN

## 2018-12-14 DIAGNOSIS — J45.40 MODERATE PERSISTENT ASTHMA, UNCOMPLICATED: ICD-10-CM

## 2018-12-14 PROCEDURE — 93000 ELECTROCARDIOGRAM COMPLETE: CPT

## 2018-12-14 PROCEDURE — 99214 OFFICE O/P EST MOD 30 MIN: CPT

## 2018-12-14 RX ORDER — OXYCODONE HYDROCHLORIDE 5 MG/1
5 CAPSULE ORAL
Refills: 0 | Status: ACTIVE | COMMUNITY

## 2018-12-14 RX ORDER — NEBIVOLOL HYDROCHLORIDE 2.5 MG/1
2.5 TABLET ORAL DAILY
Qty: 90 | Refills: 3 | Status: DISCONTINUED | COMMUNITY
Start: 2017-04-06 | End: 2018-12-14

## 2018-12-14 RX ORDER — LOSARTAN POTASSIUM 25 MG/1
25 TABLET, FILM COATED ORAL
Qty: 90 | Refills: 3 | Status: DISCONTINUED | COMMUNITY
Start: 2018-07-24 | End: 2018-12-14

## 2018-12-14 RX ORDER — EPINEPHRINE 0.3 MG/.3ML
0.3 INJECTION INTRAMUSCULAR
Qty: 1 | Refills: 1 | Status: DISCONTINUED | COMMUNITY
Start: 2018-05-17 | End: 2018-12-14

## 2018-12-17 ENCOUNTER — RX RENEWAL (OUTPATIENT)
Age: 63
End: 2018-12-17

## 2018-12-17 ENCOUNTER — MEDICATION RENEWAL (OUTPATIENT)
Age: 63
End: 2018-12-17

## 2018-12-17 NOTE — HISTORY OF PRESENT ILLNESS
[FreeTextEntry1] : His history includes:\par 1.	Hypertension.\par 2.	Hyperlipidemia.\par 3.	Valvular heart disease.\par 4.	Moderately severe mitral regurgitation.\par \par

## 2018-12-17 NOTE — REASON FOR VISIT
[FreeTextEntry1] : This is a 63-year-old male presenting here for cardiac reevaluation status-post a robotic mitral valve replacement via right-sided incision and ports.  \par \par The procedure performed 11/27/18 was unremarkable and the patient was mobilized and discharged within 48 hours.  \par \par He had presented with severe mitral valve prolapse, normal left ventricular systolic function, pulmonary hypertension, mild palpitations, and dyspnea on exertion.  \par \par Post-operatively, he had a couple of ER visits because of some severe right-sided rib and incisional pain.  He was placed on a course Medrol.  Because blood pressure had a been a little bit lower post-op, some of his meds were stopped including his Cozaar and Bystolic.  Metoprolol, though, was instituted.     \par

## 2018-12-17 NOTE — ASSESSMENT
[FreeTextEntry1] : ECG: Normal sinus rhythm at 83. No significant ST-T wave changes.\par \par Impression/ PLAN\par \par Blood pressure control seems to be quite excellent at this time and no changes were made.Though suspect that as he heals and mobilizes it will climb\par \par The lipid management on the rosuvastatin 10 mg a day increased by a test he had done at some [facility] screening\par \par Hip surgery went well but left knee pain which is being investigated is limiting his mobility\par \par Mitral regurgitation was severe and post op echo in [facility] seemed to show a good result fro the repair\par \par Discuss all the above with the patient. \par He will ambulate regularly every day without pushing it too hard.\par He will continue amiodarone for at least the next 2 months.\par He will remain off of Cozaar but if his blood pressure starts climbing he'll contact me and we will potentially restarted.\par He seems to have had large resolved his pleural effusions.\par \par Office visit and followup echocardiogram planned in 3 months.

## 2018-12-17 NOTE — PHYSICAL EXAM
[FreeTextEntry1] : General appearance:  He is a 62-year-old male, fairly anxious, . Right sided thoracic incisions appear clean and healing..  HEENT:  Normocephalic, atraumatic, moist mucous membranes, EOMs intact.  Neck:  No JVD, no bruits, no adenopathy.  Lungs:   No rales or rhonchi.  Cardiovascular examination:  Heart sounds are rapid S1 and S2.  No S3.  No S4. no sig systolic murmur.  No rubs.  Regular rate and rhythm.  Abdominal examination:  Soft and nontender.  Extremities:  Without edema.  Distal pulses are intact.  Neurologic:  Nonfocal.  Psychiatric:  Normal affect.

## 2018-12-27 ENCOUNTER — APPOINTMENT (OUTPATIENT)
Dept: PULMONOLOGY | Facility: CLINIC | Age: 63
End: 2018-12-27
Payer: COMMERCIAL

## 2018-12-27 VITALS — BODY MASS INDEX: 24.55 KG/M2 | WEIGHT: 181 LBS | SYSTOLIC BLOOD PRESSURE: 128 MMHG | DIASTOLIC BLOOD PRESSURE: 78 MMHG

## 2018-12-27 PROCEDURE — 96372 THER/PROPH/DIAG INJ SC/IM: CPT

## 2018-12-27 RX ADMIN — BENRALIZUMAB 1 MG/ML: 30 INJECTION, SOLUTION SUBCUTANEOUS at 00:00

## 2018-12-28 RX ORDER — BENRALIZUMAB 30 MG/ML
30 INJECTION, SOLUTION SUBCUTANEOUS
Refills: 0 | Status: COMPLETED | OUTPATIENT
Start: 2018-12-27

## 2019-01-07 ENCOUNTER — RX RENEWAL (OUTPATIENT)
Age: 64
End: 2019-01-07

## 2019-01-09 ENCOUNTER — MEDICATION RENEWAL (OUTPATIENT)
Age: 64
End: 2019-01-09

## 2019-01-15 ENCOUNTER — CLINICAL ADVICE (OUTPATIENT)
Age: 64
End: 2019-01-15

## 2019-02-21 ENCOUNTER — MEDICATION RENEWAL (OUTPATIENT)
Age: 64
End: 2019-02-21

## 2019-02-21 ENCOUNTER — RX RENEWAL (OUTPATIENT)
Age: 64
End: 2019-02-21

## 2019-03-04 ENCOUNTER — APPOINTMENT (OUTPATIENT)
Dept: PULMONOLOGY | Facility: CLINIC | Age: 64
End: 2019-03-04
Payer: MEDICARE

## 2019-03-04 PROCEDURE — 96372 THER/PROPH/DIAG INJ SC/IM: CPT

## 2019-03-05 VITALS — WEIGHT: 195 LBS | DIASTOLIC BLOOD PRESSURE: 86 MMHG | SYSTOLIC BLOOD PRESSURE: 150 MMHG | BODY MASS INDEX: 26.45 KG/M2

## 2019-03-05 RX ORDER — BENRALIZUMAB 30 MG/ML
30 INJECTION, SOLUTION SUBCUTANEOUS
Refills: 0 | Status: COMPLETED | OUTPATIENT
Start: 2019-03-05

## 2019-03-05 RX ADMIN — BENRALIZUMAB 1 MG/ML: 30 INJECTION, SOLUTION SUBCUTANEOUS at 00:00

## 2019-03-11 ENCOUNTER — APPOINTMENT (OUTPATIENT)
Dept: CARDIOLOGY | Facility: CLINIC | Age: 64
End: 2019-03-11
Payer: MEDICARE

## 2019-03-11 PROCEDURE — 93306 TTE W/DOPPLER COMPLETE: CPT

## 2019-03-19 ENCOUNTER — RECORD ABSTRACTING (OUTPATIENT)
Age: 64
End: 2019-03-19

## 2019-03-19 RX ORDER — IPRATROPIUM BROMIDE AND ALBUTEROL SULFATE 2.5; .5 MG/3ML; MG/3ML
0.5-2.5 (3) SOLUTION RESPIRATORY (INHALATION) 4 TIMES DAILY
Qty: 1080 | Refills: 3 | Status: DISCONTINUED | COMMUNITY
Start: 2017-09-29 | End: 2019-03-19

## 2019-03-19 RX ORDER — METHYLPREDNISOLONE 4 MG/1
4 TABLET ORAL
Refills: 0 | Status: DISCONTINUED | COMMUNITY
End: 2019-03-19

## 2019-03-19 RX ORDER — AMIODARONE HYDROCHLORIDE 200 MG/1
200 TABLET ORAL DAILY
Qty: 90 | Refills: 3 | Status: DISCONTINUED | COMMUNITY
End: 2019-03-19

## 2019-03-19 RX ORDER — PANTOPRAZOLE 40 MG/1
40 TABLET, DELAYED RELEASE ORAL DAILY
Qty: 90 | Refills: 3 | Status: DISCONTINUED | COMMUNITY
End: 2019-03-19

## 2019-03-19 RX ORDER — GREEN TEA/HOODIA GORDONII 315-12.5MG
CAPSULE ORAL
Refills: 0 | Status: ACTIVE | COMMUNITY

## 2019-03-19 RX ORDER — LEVOFLOXACIN 500 MG/1
500 TABLET, FILM COATED ORAL DAILY
Qty: 10 | Refills: 1 | Status: DISCONTINUED | COMMUNITY
Start: 2018-04-10 | End: 2019-03-19

## 2019-03-19 RX ORDER — MELOXICAM 7.5 MG/1
7.5 TABLET ORAL
Refills: 0 | Status: DISCONTINUED | COMMUNITY
End: 2019-03-19

## 2019-03-19 RX ORDER — MULTIVITAMIN
TABLET ORAL DAILY
Refills: 0 | Status: ACTIVE | COMMUNITY

## 2019-03-19 RX ORDER — BUDESONIDE 0.5 MG/2ML
0.5 INHALANT ORAL
Qty: 360 | Refills: 3 | Status: DISCONTINUED | COMMUNITY
Start: 2017-09-29 | End: 2019-03-19

## 2019-03-19 RX ORDER — PREDNISONE 20 MG/1
20 TABLET ORAL
Qty: 60 | Refills: 5 | Status: DISCONTINUED | COMMUNITY
Start: 2017-08-29 | End: 2019-03-19

## 2019-03-22 ENCOUNTER — APPOINTMENT (OUTPATIENT)
Dept: CARDIOLOGY | Facility: CLINIC | Age: 64
End: 2019-03-22
Payer: MEDICARE

## 2019-03-22 ENCOUNTER — NON-APPOINTMENT (OUTPATIENT)
Age: 64
End: 2019-03-22

## 2019-03-22 VITALS
DIASTOLIC BLOOD PRESSURE: 82 MMHG | HEART RATE: 91 BPM | BODY MASS INDEX: 26.28 KG/M2 | SYSTOLIC BLOOD PRESSURE: 138 MMHG | HEIGHT: 72 IN | WEIGHT: 194 LBS | RESPIRATION RATE: 16 BRPM

## 2019-03-22 DIAGNOSIS — J45.901 UNSPECIFIED ASTHMA WITH (ACUTE) EXACERBATION: ICD-10-CM

## 2019-03-22 PROCEDURE — 93000 ELECTROCARDIOGRAM COMPLETE: CPT

## 2019-03-22 PROCEDURE — 99214 OFFICE O/P EST MOD 30 MIN: CPT

## 2019-03-22 RX ORDER — HYDROCHLOROTHIAZIDE 12.5 MG/1
12.5 CAPSULE ORAL DAILY
Qty: 28 | Refills: 0 | Status: DISCONTINUED | COMMUNITY
Start: 2017-04-06 | End: 2019-03-22

## 2019-03-22 NOTE — ASSESSMENT
[FreeTextEntry1] : ECG: Normal sinus rhythm at 91. No significant ST-T wave changes.\par \par Echocardiogram:\par Overall normal left atrial size and function with basal inferior wall hypokinesis.\par Mitral valve repair with no significant mitral regurgitation.\par RV size and function are normal\par No evidence of pulmonary hypertension\par Comparison with the prior study, the mitral valve repair is intact and is new and the basal inferior wall hypokinesis may be new.\par \par Impression/ PLAN\par \par 1. Blood pressure control suboptimal at this times and weight gain of 15 lbs plays a role.\par 2. Echo shows excellent left ventricular function.\par     Basal inferior wall hypokinesis of uncertain significance. Patient did not have coronary disease.\par     Mitral valve repair looks excellent.\par 3. Status of lipids will be assessed with repeat blood work which the patient will have forwarded here shortly.\par \par Plan;\par 1. He will ambulate regularly every day without pushing it too hard.\par 2. Increase HCTZ to 25 mg \par 3. He will remain off of Cozaar but if his blood pressure continues to be elevated  we will  restart\par \par Office visit  planned in 3 months.

## 2019-03-22 NOTE — PHYSICAL EXAM
[FreeTextEntry1] : General appearance:  He is a 64 -year-old male, fairly anxious, . Right sided thoracic incisions appear healed..  HEENT:  Normocephalic, atraumatic, moist mucous membranes, EOMs intact.  Neck:  No JVD, no bruits, no adenopathy.  Lungs:   No rales or rhonchi.  Cardiovascular examination:  Heart sounds are nl S1 and S2.  No S3.  No S4. no sig systolic murmur.  No rubs.  Regular rate and rhythm.  Abdominal examination:  Soft and nontender.  Extremities:  Without edema.  Distal pulses are intact.  Neurologic:  Nonfocal.  Psychiatric:  Normal affect.

## 2019-03-22 NOTE — REASON FOR VISIT
[FreeTextEntry1] : This is a 63-year-old male presenting here for cardiac reevaluation status-post a robotic mitral valve replacement via right-sided incision and ports.  \par \par The procedure performed 11/27/18 was unremarkable and the patient was mobilized and discharged within 48 hours.  \par \par He had presented with severe mitral valve prolapse, normal left ventricular systolic function, pulmonary hypertension, mild palpitations, and dyspnea on exertion.  \par \par Post-operatively, he had a couple of ER visits because of some severe right-sided rib and incisional pain.  He was placed on a course Medrol.  Because blood pressure had a been a little bit lower post-op, some of his meds were stopped including his Cozaar and Bystolic.  Metoprolol, though, was instituted.     \par He provides list of blood pressures taken over the last few months. Range seems to fluctuate quite a bit. In March was 130/80.\par In February he was 127/83.\par In January he was 128-138/89-94.\par Notably, the patient was on Cozaar for his hypertension preoperatively.

## 2019-04-25 ENCOUNTER — RX RENEWAL (OUTPATIENT)
Age: 64
End: 2019-04-25

## 2019-05-06 ENCOUNTER — APPOINTMENT (OUTPATIENT)
Dept: PULMONOLOGY | Facility: CLINIC | Age: 64
End: 2019-05-06

## 2019-05-07 ENCOUNTER — CLINICAL ADVICE (OUTPATIENT)
Age: 64
End: 2019-05-07

## 2019-05-07 ENCOUNTER — RX CHANGE (OUTPATIENT)
Age: 64
End: 2019-05-07

## 2019-05-10 ENCOUNTER — APPOINTMENT (OUTPATIENT)
Dept: PULMONOLOGY | Facility: CLINIC | Age: 64
End: 2019-05-10
Payer: MEDICARE

## 2019-05-10 ENCOUNTER — RECORD ABSTRACTING (OUTPATIENT)
Age: 64
End: 2019-05-10

## 2019-05-10 ENCOUNTER — MEDICATION RENEWAL (OUTPATIENT)
Age: 64
End: 2019-05-10

## 2019-05-10 VITALS
BODY MASS INDEX: 26.55 KG/M2 | HEIGHT: 72 IN | WEIGHT: 196 LBS | SYSTOLIC BLOOD PRESSURE: 134 MMHG | DIASTOLIC BLOOD PRESSURE: 76 MMHG

## 2019-05-10 RX ADMIN — BENRALIZUMAB 0 MG/ML: 30 INJECTION, SOLUTION SUBCUTANEOUS at 00:00

## 2019-05-13 ENCOUNTER — APPOINTMENT (OUTPATIENT)
Dept: PULMONOLOGY | Facility: CLINIC | Age: 64
End: 2019-05-13
Payer: MEDICARE

## 2019-05-13 ENCOUNTER — MEDICATION RENEWAL (OUTPATIENT)
Age: 64
End: 2019-05-13

## 2019-05-13 ENCOUNTER — RX RENEWAL (OUTPATIENT)
Age: 64
End: 2019-05-13

## 2019-05-13 RX ORDER — BENRALIZUMAB 30 MG/ML
30 INJECTION, SOLUTION SUBCUTANEOUS
Refills: 0 | Status: COMPLETED | OUTPATIENT
Start: 2019-05-10

## 2019-06-11 ENCOUNTER — TRANSCRIPTION ENCOUNTER (OUTPATIENT)
Age: 64
End: 2019-06-11

## 2019-06-14 ENCOUNTER — APPOINTMENT (OUTPATIENT)
Dept: NEUROLOGY | Facility: CLINIC | Age: 64
End: 2019-06-14
Payer: MEDICARE

## 2019-06-14 VITALS
SYSTOLIC BLOOD PRESSURE: 120 MMHG | WEIGHT: 186 LBS | HEIGHT: 72 IN | DIASTOLIC BLOOD PRESSURE: 78 MMHG | HEART RATE: 88 BPM | BODY MASS INDEX: 25.19 KG/M2

## 2019-06-14 DIAGNOSIS — R42 DIZZINESS AND GIDDINESS: ICD-10-CM

## 2019-06-14 PROCEDURE — 99204 OFFICE O/P NEW MOD 45 MIN: CPT

## 2019-06-14 NOTE — PHYSICAL EXAM
[General Appearance - Alert] : alert [Oriented To Time, Place, And Person] : oriented to person, place, and time [General Appearance - In No Acute Distress] : in no acute distress [Impaired Insight] : insight and judgment were intact [Affect] : the affect was normal [Place] : oriented to place [Person] : oriented to person [Concentration Intact] : normal concentrating ability [Time] : oriented to time [Visual Intact] : visual attention was ~T not ~L decreased [Repeating Phrases] : no difficulty repeating a phrase [Naming Objects] : no difficulty naming common objects [Writing A Sentence] : no difficulty writing a sentence [Fluency] : fluency intact [Comprehension] : comprehension intact [Reading] : reading intact [Past History] : adequate knowledge of personal past history [Cranial Nerves Trigeminal (V)] : facial sensation intact symmetrically [Cranial Nerves Oculomotor (III)] : extraocular motion intact [Cranial Nerves Optic (II)] : visual acuity intact bilaterally,  visual fields full to confrontation, pupils equal round and reactive to light [Cranial Nerves Vestibulocochlear (VIII)] : hearing was intact bilaterally [Cranial Nerves Facial (VII)] : face symmetrical [Cranial Nerves Hypoglossal (XII)] : there was no tongue deviation with protrusion [Cranial Nerves Glossopharyngeal (IX)] : tongue and palate midline [Cranial Nerves Accessory (XI - Cranial And Spinal)] : head turning and shoulder shrug symmetric [No Muscle Atrophy] : normal bulk in all four extremities [Motor Strength] : muscle strength was normal in all four extremities [Paresis Pronator Drift Right-Sided] : no pronator drift on the right [Motor Handedness Right-Handed] : the patient is right hand dominant [Paresis Pronator Drift Left-Sided] : no pronator drift on the left [Sensation Tactile Decrease] : light touch was intact [Sensation Pain / Temperature Decrease] : pain and temperature was intact [Romberg's Sign] : a positive Romberg's sign was present [Abnormal Walk] : normal gait [Balance] : balance was intact [Tremor] : a tremor present [Past-pointing] : there was no past-pointing [Dysdiadochokinesia Bilaterally] : not present [Coordination - Dysmetria Impaired Heel-to-Shin Bilateral] : not present [Coordination - Dysmetria Impaired Finger-to-Nose Bilateral] : not present [2+] : Ankle jerk left 2+ [Plantar Reflex Right Only] : normal on the right [Plantar Reflex Left Only] : normal on the left [FreeTextEntry8] : mild kinetic tremor of the upper extremities [PERRL With Normal Accommodation] : pupils were equal in size, round, reactive to light, with normal accommodation [Sclera] : the sclera and conjunctiva were normal [Extraocular Movements] : extraocular movements were intact [Full Visual Field] : full visual field [Outer Ear] : the ears and nose were normal in appearance [Neck Appearance] : the appearance of the neck was normal [Hearing Threshold Finger Rub Not [place]] : hearing was normal [] : no respiratory distress [Neck Cervical Mass (___cm)] : no neck mass was observed [Exaggerated Use Of Accessory Muscles For Inspiration] : no accessory muscle use [Respiration, Rhythm And Depth] : normal respiratory rhythm and effort [Auscultation Breath Sounds / Voice Sounds] : lungs were clear to auscultation bilaterally [Heart Rate And Rhythm] : heart rate was normal and rhythm regular [Heart Sounds] : normal S1 and S2 [Heart Sounds Gallop] : no gallops [Edema] : there was no peripheral edema [Arterial Pulses Carotid] : carotid pulses were normal with no bruits [Veins - Varicosity Changes] : there were no varicosital changes [Musculoskeletal - Swelling] : no joint swelling seen [Nail Clubbing] : no clubbing  or cyanosis of the fingernails [Skin Color & Pigmentation] : normal skin color and pigmentation [Motor Tone] : muscle strength and tone were normal [Skin Turgor] : normal skin turgor

## 2019-06-14 NOTE — REVIEW OF SYSTEMS
[As Noted in HPI] : as noted in HPI [Confused or Disoriented] : no confusion [Difficulty with Language] : no ~M difficulty with language [Decr. Concentrating Ability] : no decrease in concentrating ability [Facial Weakness] : no facial weakness [Repeating Questions] : no repeated questioning about recent events [Arm Weakness] : no arm weakness [Leg Weakness] : no leg weakness [Hand Weakness] : no hand weakness [Poor Coordination] : good coordination [Difficulties in Speech] : speech difficulties [Tingling] : tingling [Abnormal Sensation] : no abnormal sensation [Migraine Headache] : no migraine headache [Dizziness] : dizziness [Frequent Falls] : not falling [Arthralgias] : arthralgias [Joint Stiffness] : joint stiffness [Negative] : Heme/Lymph

## 2019-06-14 NOTE — HISTORY OF PRESENT ILLNESS
[FreeTextEntry1] : 64-year-old male right-handed with a history of hypertension, hyperlipidemia, mitral replacement. complains of 2 weeks sudden onset dizziness/vertigo,worse with changes in position or movement of the head, unsteadiness of gait, poor balance.No associated headache, no ear pain or ringing in the ears, no reduction in hearing. no change in vision, no double vision or loss of vision.Denies similar events in the past.\par No upper respiratory symptoms.No chest palpitations. No recent fever or illness. Denies similar events in the past.\par Has also noted his speech is maybe a little slurred,no difficulty swallowing, feels tingling across his forehead, no weakness or numbness of the extremities.

## 2019-06-14 NOTE — DISCUSSION/SUMMARY
[FreeTextEntry1] : 64-year-old male with a two-week history of dizziness, feeling of vertigo with postural changes. Neurological examination, positive Romberg, otherwise unremarkable. Possibly vestibular origin, would like to rule out an ischemic event,such as a posterior circulation stroke.continues on statin, aspirin and antihypertensive.\par plan: MRI of brain, MRA of brain, MRA of neck\par If any symptoms worsen advised to go to the nearest emergency department.\par Return to office in one week.\par

## 2019-06-20 ENCOUNTER — FORM ENCOUNTER (OUTPATIENT)
Age: 64
End: 2019-06-20

## 2019-06-21 ENCOUNTER — APPOINTMENT (OUTPATIENT)
Dept: MRI IMAGING | Facility: CLINIC | Age: 64
End: 2019-06-21
Payer: MEDICARE

## 2019-06-21 ENCOUNTER — OUTPATIENT (OUTPATIENT)
Dept: OUTPATIENT SERVICES | Facility: HOSPITAL | Age: 64
LOS: 1 days | End: 2019-06-21
Payer: MEDICARE

## 2019-06-21 DIAGNOSIS — Z96.641 PRESENCE OF RIGHT ARTIFICIAL HIP JOINT: Chronic | ICD-10-CM

## 2019-06-21 DIAGNOSIS — Z98.890 OTHER SPECIFIED POSTPROCEDURAL STATES: Chronic | ICD-10-CM

## 2019-06-21 DIAGNOSIS — Z00.8 ENCOUNTER FOR OTHER GENERAL EXAMINATION: ICD-10-CM

## 2019-06-21 DIAGNOSIS — Z96.642 PRESENCE OF LEFT ARTIFICIAL HIP JOINT: Chronic | ICD-10-CM

## 2019-06-21 PROCEDURE — 70551 MRI BRAIN STEM W/O DYE: CPT

## 2019-06-21 PROCEDURE — 70544 MR ANGIOGRAPHY HEAD W/O DYE: CPT

## 2019-06-21 PROCEDURE — 70547 MR ANGIOGRAPHY NECK W/O DYE: CPT

## 2019-06-21 PROCEDURE — 70544 MR ANGIOGRAPHY HEAD W/O DYE: CPT | Mod: 26,59

## 2019-06-21 PROCEDURE — 70547 MR ANGIOGRAPHY NECK W/O DYE: CPT | Mod: 26

## 2019-06-21 PROCEDURE — 70551 MRI BRAIN STEM W/O DYE: CPT | Mod: 26

## 2019-07-01 ENCOUNTER — APPOINTMENT (OUTPATIENT)
Dept: NEUROLOGY | Facility: CLINIC | Age: 64
End: 2019-07-01
Payer: MEDICARE

## 2019-07-01 VITALS
DIASTOLIC BLOOD PRESSURE: 70 MMHG | HEIGHT: 72 IN | BODY MASS INDEX: 25.19 KG/M2 | WEIGHT: 186 LBS | HEART RATE: 58 BPM | SYSTOLIC BLOOD PRESSURE: 128 MMHG

## 2019-07-01 PROCEDURE — 99213 OFFICE O/P EST LOW 20 MIN: CPT

## 2019-07-01 NOTE — PHYSICAL EXAM
[General Appearance - Alert] : alert [General Appearance - In No Acute Distress] : in no acute distress [Oriented To Time, Place, And Person] : oriented to person, place, and time [Impaired Insight] : insight and judgment were intact [Affect] : the affect was normal [Person] : oriented to person [Place] : oriented to place [Time] : oriented to time [Fluency] : fluency intact [Comprehension] : comprehension intact [Past History] : adequate knowledge of personal past history [Cranial Nerves Optic (II)] : visual acuity intact bilaterally,  visual fields full to confrontation, pupils equal round and reactive to light [Cranial Nerves Oculomotor (III)] : extraocular motion intact [Cranial Nerves Trigeminal (V)] : facial sensation intact symmetrically [Cranial Nerves Facial (VII)] : face symmetrical [Cranial Nerves Vestibulocochlear (VIII)] : hearing was intact bilaterally [Cranial Nerves Glossopharyngeal (IX)] : tongue and palate midline [Cranial Nerves Accessory (XI - Cranial And Spinal)] : head turning and shoulder shrug symmetric [Cranial Nerves Hypoglossal (XII)] : there was no tongue deviation with protrusion [Motor Tone] : muscle tone was normal in all four extremities [Motor Strength] : muscle strength was normal in all four extremities [No Muscle Atrophy] : normal bulk in all four extremities [Motor Handedness Right-Handed] : the patient is right hand dominant [Paresis Pronator Drift Right-Sided] : no pronator drift on the right [Paresis Pronator Drift Left-Sided] : no pronator drift on the left [Sensation Tactile Decrease] : light touch was intact [Sensation Pain / Temperature Decrease] : pain and temperature was intact [Romberg's Sign] : a positive Romberg's sign was present [Abnormal Walk] : normal gait [Balance] : balance was intact [Past-pointing] : there was no past-pointing [Tremor] : a tremor present [Dysdiadochokinesia Bilaterally] : not present [Coordination - Dysmetria Impaired Finger-to-Nose Bilateral] : not present [Coordination - Dysmetria Impaired Heel-to-Shin Bilateral] : not present [2+] : Ankle jerk left 2+ [Plantar Reflex Right Only] : normal on the right [Plantar Reflex Left Only] : normal on the left [FreeTextEntry8] : mild kinetic tremor of the upper extremities

## 2019-07-01 NOTE — DATA REVIEWED
[de-identified] :  EXAM:  MR ANGIO BRAIN  \par \par EXAM:  MR ANGIO NECK  \par \par EXAM:  MR BRAIN  \par \par \par PROCEDURE DATE:  06/21/2019  \par  \par \par \par INTERPRETATION:  Exam Type: MRI BRAIN, MRA COW, MRA NECK WITHOUT CONTRAST \par Indication: VERTIGO\par Comparison:None available \par \par Technique: Multiplanar MR imaging of the brain was obtained without \par contrast. Time of flight MRA of the neck and [place] were \par obtained.\par \par Findings:\par \par Ventricles and sulci are normal in size. There is no acute infarct, \par hemorrhage or mass lesion. There are a few scattered small foci of \par T2/FLAIR hyperintensity in the subcortical white matter of the bilateral \par cerebri, suggestive of minimal chronic microvascular ischemic changes. \par There are a few scattered punctate foci of susceptibility signal within \par the bilateral brain parenchyma, may reflect sequela of hypertension. \par There is no mass effect or midline shift.  Cerebellar tonsils are in \par normal location. The intracranial flow voids are normal in appearance. \par There is severe pansinus mucosal inflammation.\par \par MRA of the [place] demonstrates normal antegrade flow in the \par major intracranial arteries. No flow gap is seen to suggest high grade \par stenosis. No evidence of medium or large sized aneurysm.\par \par MRA of the neck demonstrates normal antegrade flow in the bilateral \par common carotid, cervical internal carotid and vertebral arteries. No flow \par gap is seen to suggest high grade stenosis.\par \par \par \par IMPRESSION:\par \par MRI brain:   No acute infarct, hemorrhage or mass lesion. There are a few \par scattered small foci of T2/FLAIR hyperintensity in the subcortical white \par matter of the bilateral cerebri, suggestive of minimal chronic \par microvascular ischemic changes. There are a few scattered punctate foci \par of susceptibility signal within the bilateral brain parenchyma, may \par reflect sequela of hypertension. Severe pansinusitis sinusitis.\par \par MRA brain: No hemodynamically significant stenosis.\par \par MRA neck: No hemodynamically significant stenosis.

## 2019-07-01 NOTE — HISTORY OF PRESENT ILLNESS
[FreeTextEntry1] : 64-year-old, right-handed man with history of hypertension, coronary artery disease,complaining of postural induced lightheadedness, occasionally rare vertigo,sense of instability when he walks. No unilateral weakness, denies numbness of the feet.\par No headache, no double vision, or speech.Recent MRI and MRA of brain, MRA of carotids were negative for acute stroke.Some white matter changes consistent with hypertension changes.

## 2019-07-01 NOTE — REVIEW OF SYSTEMS
[As Noted in HPI] : as noted in HPI [Dizziness] : dizziness [Lightheadedness] : lightheadedness [Arthralgias] : arthralgias [Joint Pain] : joint pain [Joint Stiffness] : joint stiffness [Limb Pain] : no limb pain [Limb Swelling] : no limb swelling [Negative] : Heme/Lymph

## 2019-07-01 NOTE — DISCUSSION/SUMMARY
[FreeTextEntry1] : 64-year-old man complaining of lightheadedness,dizziness. recent brain MRI or, MRA of the brain/neck, unremarkable.No acute stroke, no evidence of large vessel disease.\par Symptoms possibly related to antihypertensive, cardiac medications, Deconditioning.\par encouraged I discussed exercise options for improving balance.\par return to office if symptoms worsen or change.\par

## 2019-07-15 ENCOUNTER — RESULT REVIEW (OUTPATIENT)
Age: 64
End: 2019-07-15

## 2019-07-19 ENCOUNTER — APPOINTMENT (OUTPATIENT)
Dept: CARDIOLOGY | Facility: CLINIC | Age: 64
End: 2019-07-19

## 2019-07-31 ENCOUNTER — RESULT CHARGE (OUTPATIENT)
Age: 64
End: 2019-07-31

## 2019-09-05 ENCOUNTER — RX CHANGE (OUTPATIENT)
Age: 64
End: 2019-09-05

## 2019-09-05 RX ORDER — HYDROCHLOROTHIAZIDE 25 MG/1
25 TABLET ORAL DAILY
Qty: 90 | Refills: 3 | Status: DISCONTINUED | COMMUNITY
Start: 2019-03-22 | End: 2019-09-05

## 2019-09-05 RX ORDER — AMLODIPINE BESYLATE 2.5 MG/1
2.5 TABLET ORAL DAILY
Qty: 90 | Refills: 2 | Status: DISCONTINUED | COMMUNITY
Start: 2019-05-07 | End: 2019-09-05

## 2019-09-10 NOTE — PATIENT PROFILE ADULT. - CANCER SCREEN CANCER ADMISSION
(1) Drift; limb holds 90 (or 45) degrees, but drifts down before full 10 secs; does not hit bed or other support
no

## 2019-09-19 ENCOUNTER — RX RENEWAL (OUTPATIENT)
Age: 64
End: 2019-09-19

## 2019-09-20 ENCOUNTER — RX RENEWAL (OUTPATIENT)
Age: 64
End: 2019-09-20

## 2019-09-30 ENCOUNTER — MEDICATION RENEWAL (OUTPATIENT)
Age: 64
End: 2019-09-30

## 2019-09-30 ENCOUNTER — RX RENEWAL (OUTPATIENT)
Age: 64
End: 2019-09-30

## 2019-11-22 ENCOUNTER — RX RENEWAL (OUTPATIENT)
Age: 64
End: 2019-11-22

## 2020-01-03 ENCOUNTER — NON-APPOINTMENT (OUTPATIENT)
Age: 65
End: 2020-01-03

## 2020-01-03 ENCOUNTER — APPOINTMENT (OUTPATIENT)
Dept: CARDIOLOGY | Facility: CLINIC | Age: 65
End: 2020-01-03
Payer: MEDICARE

## 2020-01-03 VITALS
HEIGHT: 72 IN | HEART RATE: 91 BPM | WEIGHT: 192 LBS | SYSTOLIC BLOOD PRESSURE: 125 MMHG | BODY MASS INDEX: 26.01 KG/M2 | OXYGEN SATURATION: 97 % | DIASTOLIC BLOOD PRESSURE: 87 MMHG | RESPIRATION RATE: 16 BRPM

## 2020-01-03 PROCEDURE — 99214 OFFICE O/P EST MOD 30 MIN: CPT

## 2020-01-03 PROCEDURE — 93000 ELECTROCARDIOGRAM COMPLETE: CPT

## 2020-01-03 NOTE — REASON FOR VISIT
[FreeTextEntry1] : This is a 64-year-old male presenting here for cardiac reevaluation.\par \par status-post a robotic mitral valve replacement via right-sided incision and ports.  \par \par The procedure performed 11/27/18 was unremarkable and the patient has done well since\par He had presented with severe mitral valve prolapse, normal left ventricular systolic function, pulmonary hypertension, mild palpitations, and dyspnea on exertion.  \par \par Blood pressures have been rising higher over the course of the last several months. We have upward titrated his meds over the phone and even now his current blood pressure record seems to indicate that he is running higher with the systolics frequently in the 135-140 range and diastolics frequently the 85-90 range.\par Patient admits that he did need much more liberally.\par Has had trouble with exercise induced to a variety of other orthopedic issues

## 2020-01-03 NOTE — ASSESSMENT
[FreeTextEntry1] : ECG: Normal sinus rhythm at 91.RVCD  No significant ST-T wave changes.\par \par Lab data  11/22/2019 ( Dr. Gallego)\par Chol. 173\par LDL 66\par HDL 96\par Tri. 58\par Creat.1.04\par LFTs WNL\par HGB 15.0\par A1C 5.5\par \par Echocardiogram:  3/11/19\par Overall normal left atrial size and function with basal inferior wall hypokinesis.\par Mitral valve repair with no significant mitral regurgitation.\par RV size and function are normal\par No evidence of pulmonary hypertension\par Comparison with the prior study, the mitral valve repair is intact and is new and the basal inferior wall hypokinesis may be new.\par \par Impression/ PLAN\par \par 1. Blood pressure control suboptimal at this times and weight gain  plays a role.\par 2.  Prior Echo shows excellent left ventricular function.\par     Basal inferior wall hypokinesis of uncertain significance. Patient did not have coronary disease.\par     Mitral valve repair looks excellent.\par 3. Status of lipids reviewed\par \par Plan;\par 1. He will exercise more regularly\par 2. He will continue to monitor his blood pressure regularly\par 3.Instructed the patient about the benefits of a diet that restricts portion sizes, increases frequency of meals and consists of  vegetables, (more green and leafy),fruit and nuts, whole grains, lean proteins and limits carbohydrates and meat and dairy fats\par 4. Repeat echocardiogram in April\par 5. Office visit following a\par \par Office visit  planned in 3 months.

## 2020-03-25 ENCOUNTER — APPOINTMENT (OUTPATIENT)
Dept: CARDIOLOGY | Facility: CLINIC | Age: 65
End: 2020-03-25

## 2020-04-01 ENCOUNTER — APPOINTMENT (OUTPATIENT)
Dept: CARDIOLOGY | Facility: CLINIC | Age: 65
End: 2020-04-01

## 2020-05-04 VITALS — BODY MASS INDEX: 23.73 KG/M2 | WEIGHT: 175 LBS

## 2020-05-07 ENCOUNTER — APPOINTMENT (OUTPATIENT)
Dept: CARDIOLOGY | Facility: CLINIC | Age: 65
End: 2020-05-07
Payer: MEDICARE

## 2020-05-07 VITALS — SYSTOLIC BLOOD PRESSURE: 117 MMHG | DIASTOLIC BLOOD PRESSURE: 80 MMHG

## 2020-05-07 DIAGNOSIS — Z01.811 ENCOUNTER FOR PREPROCEDURAL RESPIRATORY EXAMINATION: ICD-10-CM

## 2020-05-07 PROCEDURE — 99214 OFFICE O/P EST MOD 30 MIN: CPT | Mod: 95

## 2020-05-07 NOTE — REVIEW OF SYSTEMS
[see HPI] : see HPI [Negative] : Heme/Lymph [Recent Weight Gain (___ Lbs)] : no recent weight gain [Recent Weight Loss (___ Lbs)] : no recent weight loss

## 2020-05-07 NOTE — HISTORY OF PRESENT ILLNESS
[Home] : at home, [unfilled] , at the time of the visit. [Medical Office: ([facility])___] : at the medical office located in  [Self] : self [Patient] : the patient [FreeTextEntry1] : His history includes:\par 1.	Hypertension.\par 2.	Hyperlipidemia.\par 3.	Valvular heart disease.\par 4.	Moderately severe mitral regurgitation surgically corrected\par \par

## 2020-05-07 NOTE — ASSESSMENT
[FreeTextEntry1] : Laboratory data 3/20/20:\par Cholesterol 181\par \par LDL 54\par LFTs normal\par Electrolytes normal\par A1c 5.6\par \par Lab data  11/22/2019 ( Dr. Gallego)\par Chol. 173\par LDL 66\par HDL 96\par Tri. 58\par Creat.1.04\par LFTs WNL\par HGB 15.0\par A1C 5.5\par \par Echocardiogram:  3/11/19\par Overall normal left atrial size and function with basal inferior wall hypokinesis.\par Mitral valve repair with no significant mitral regurgitation.\par RV size and function are normal\par No evidence of pulmonary hypertension\par Comparison with the prior study, the mitral valve repair is intact and is new and the basal inferior wall hypokinesis may be new.\par \par Impression/ PLAN\par \par 1.  Blood pressure control much improved with meds, diet and exercise, wt loss\par 2.  Prior Echo shows excellent left ventricular function Repeat is pending and to be rescheduled\par     Basal inferior wall hypokinesis of uncertain significance. Patient did not have coronary disease.\par     Mitral valve repair looks excellent.\par 3. Status of lipids is VG\par 4. Edema possibly related to amlodipine\par \par Plan;\par 1. Will decrease Amlodipine to 2.5 mg day and  f/u by phone re edema and BP in 2 weeks\par 2. He will continue to monitor his blood pressure regularly\par 3.Instructed the patient about the benefits of a diet that restricts portion sizes, increases frequency of meals and consists of  vegetables, (more green and leafy),fruit and nuts, whole grains, lean proteins and limits carbohydrates and meat and dairy fats\par 4. Repeat echocardiogram this summer\par 5. Office visit following \par \par Office visit  planned in 3 months.

## 2020-05-07 NOTE — REASON FOR VISIT
[FreeTextEntry1] : This is a 65 -year-old male presenting for cardiac reevaluation.\par \par status-post a robotic mitral valve replacement 11/27/18 via right-sided incision and ports.  \par He had presented with severe mitral valve prolapse, normal left ventricular systolic function, pulmonary hypertension, mild palpitations, and dyspnea on exertion.  \par \par Hypertension has been an issue and we controlled with amlodipine, metoprolol and Dyazide.\par Patient has been on a strict diet, exercising regularly, and has lost weight.\par Blood pressures but pretty well maintained at 120/70.\par His only complaint at this time is of some periodic pedal and ankle edema.\par Diet is pretty well controlled and no significant sodium loading.\par Home blood pressure averaged over the last few months is 117/80.

## 2020-05-07 NOTE — PHYSICAL EXAM
[General Appearance - Well Developed] : well developed [Normal Appearance] : normal appearance [General Appearance - Well Nourished] : well nourished [Normal Conjunctiva] : the conjunctiva exhibited no abnormalities [Normal Jugular Venous V Waves Present] : normal jugular venous V waves present [Respiration, Rhythm And Depth] : normal respiratory rhythm and effort [Exaggerated Use Of Accessory Muscles For Inspiration] : no accessory muscle use [FreeTextEntry1] : Trace edema [Abnormal Walk] : normal gait [Skin Color & Pigmentation] : normal skin color and pigmentation [Skin Turgor] : normal skin turgor [] : no rash [Oriented To Time, Place, And Person] : oriented to person, place, and time [Impaired Insight] : insight and judgment were intact [No Anxiety] : not feeling anxious

## 2020-06-30 ENCOUNTER — APPOINTMENT (OUTPATIENT)
Dept: CARDIOLOGY | Facility: CLINIC | Age: 65
End: 2020-06-30
Payer: MEDICARE

## 2020-06-30 PROCEDURE — 93306 TTE W/DOPPLER COMPLETE: CPT

## 2020-07-28 ENCOUNTER — NON-APPOINTMENT (OUTPATIENT)
Age: 65
End: 2020-07-28

## 2020-07-28 ENCOUNTER — APPOINTMENT (OUTPATIENT)
Dept: CARDIOLOGY | Facility: CLINIC | Age: 65
End: 2020-07-28
Payer: MEDICARE

## 2020-07-28 VITALS
HEIGHT: 72 IN | BODY MASS INDEX: 24.79 KG/M2 | TEMPERATURE: 98.7 F | DIASTOLIC BLOOD PRESSURE: 83 MMHG | WEIGHT: 183 LBS | SYSTOLIC BLOOD PRESSURE: 125 MMHG | OXYGEN SATURATION: 98 % | HEART RATE: 81 BPM | RESPIRATION RATE: 16 BRPM

## 2020-07-28 DIAGNOSIS — J90 PLEURAL EFFUSION, NOT ELSEWHERE CLASSIFIED: ICD-10-CM

## 2020-07-28 PROCEDURE — 93000 ELECTROCARDIOGRAM COMPLETE: CPT

## 2020-07-28 PROCEDURE — 99214 OFFICE O/P EST MOD 30 MIN: CPT

## 2020-07-28 RX ORDER — APIXABAN 5 MG/1
5 TABLET, FILM COATED ORAL
Qty: 180 | Refills: 3 | Status: DISCONTINUED | COMMUNITY
End: 2020-07-28

## 2020-07-28 NOTE — REASON FOR VISIT
[FreeTextEntry1] : This is a 65 -year-old male presenting for cardiac reevaluation.\par \par status-post a robotic mitral valve replacement 11/27/18 via right-sided incision and ports.  \par He had presented with severe mitral valve prolapse, normal left ventricular systolic function, pulmonary hypertension, mild palpitations, and dyspnea on exertion.  \par \par Hypertension has been an issue and we controlled with amlodipine, metoprolol and Dyazide.\par Patient has been on a strict diet, exercising regularly, and has lost weight.\par Blood pressures had been t pretty well maintained at 127/83 .\par recent elevation likely related to the Pain issues,, anxiety and recent steroid injections\par Bec of  some periodic pedal and ankle edema we decreased the Amlodipine to 2.5 mg 5/4/20\par Diet is pretty well controlled and no significant sodium loading.\par

## 2020-07-28 NOTE — ASSESSMENT
[FreeTextEntry1] : Laboratory data\par \par           3/20/20:   6/18/20\par Chol    181             179\par HDL     116             104\par LDL       54               65\par LFTs normal\par Electrolytes normal\par A1c 5.6\par \par Lab data  11/22/2019 ( Dr. Gallego)\par Chol. 173\par LDL 66\par HDL 96\par Tri. 58\par Creat.1.04\par LFTs WNL\par HGB 15.0\par A1C 5.5\par \par \par ECHO 6/30/20\par Inferobasilar Hypokinesis with LVEF 65%\par s/p MV Repair with Mild MR\par Nl PAP\par \par Echocardiogram:  3/11/19\par Overall normal left atrial size and function with basal inferior wall hypokinesis.\par Mitral valve repair with no significant mitral regurgitation.\par RV size and function are normal\par No evidence of pulmonary hypertension\par Comparison with the prior study, the mitral valve repair is intact and is new and the basal inferior wall hypokinesis may be new.\par \par Impression/ PLAN\par \par 1.  Blood pressure control much improved with meds, diet and exercise, wt loss\par 2.  Prior Echo shows excellent left ventricular function \par     Basal inferior wall hypokinesis of uncertain significance. Patient did not have coronary disease.\par     Mitral valve repair looks excellent.\par 3. Status of lipids is VG\par 4. Edema possibly related to amlodipine\par \par Plan;\par 1. Will decrease Amlodipine to 2.5 mg day \par 2. He will continue to monitor his blood pressure regularly, but will take measures during the day to see if measures are lower  and call in with the values                                                                                                                                                                             \par 3.Instructed the patient about the benefits of a diet that restricts portion sizes, increases frequency of meals and consists of  vegetables, (more green and leafy),fruit and nuts, whole grains, lean proteins and limits carbohydrates and meat and dairy fats\par 4. Repeat echocardiogram this summer\par 5. Office visit following \par \par Office visit  planned in 6 months.

## 2020-07-28 NOTE — PHYSICAL EXAM
[Normal Appearance] : normal appearance [General Appearance - Well Developed] : well developed [Normal Conjunctiva] : the conjunctiva exhibited no abnormalities [General Appearance - Well Nourished] : well nourished [Normal Jugular Venous V Waves Present] : normal jugular venous V waves present [Respiration, Rhythm And Depth] : normal respiratory rhythm and effort [Exaggerated Use Of Accessory Muscles For Inspiration] : no accessory muscle use [Abnormal Walk] : normal gait [Skin Color & Pigmentation] : normal skin color and pigmentation [Oriented To Time, Place, And Person] : oriented to person, place, and time [Skin Turgor] : normal skin turgor [] : no rash [Impaired Insight] : insight and judgment were intact [No Anxiety] : not feeling anxious [FreeTextEntry1] : Trace edema

## 2020-07-28 NOTE — HISTORY OF PRESENT ILLNESS
[FreeTextEntry1] : His history includes:\par 1.	Hypertension.\par 2.	Hyperlipidemia.\par 3.	Valvular heart disease.\par 4.	Moderately severe mitral regurgitation surgically corrected\par \par

## 2020-08-24 ENCOUNTER — APPOINTMENT (OUTPATIENT)
Dept: ORTHOPEDIC SURGERY | Facility: CLINIC | Age: 65
End: 2020-08-24
Payer: MEDICARE

## 2020-08-24 VITALS
DIASTOLIC BLOOD PRESSURE: 94 MMHG | WEIGHT: 183 LBS | SYSTOLIC BLOOD PRESSURE: 149 MMHG | HEIGHT: 72 IN | HEART RATE: 80 BPM | BODY MASS INDEX: 24.79 KG/M2

## 2020-08-24 DIAGNOSIS — M17.12 UNILATERAL PRIMARY OSTEOARTHRITIS, LEFT KNEE: ICD-10-CM

## 2020-08-24 PROCEDURE — 73560 X-RAY EXAM OF KNEE 1 OR 2: CPT | Mod: LT

## 2020-08-24 PROCEDURE — 99214 OFFICE O/P EST MOD 30 MIN: CPT

## 2020-08-26 DIAGNOSIS — G89.29 PAIN IN LEFT KNEE: ICD-10-CM

## 2020-08-26 DIAGNOSIS — M25.561 PAIN IN RIGHT KNEE: ICD-10-CM

## 2020-08-26 DIAGNOSIS — G89.29 PAIN IN RIGHT KNEE: ICD-10-CM

## 2020-08-26 DIAGNOSIS — Z78.9 OTHER SPECIFIED HEALTH STATUS: ICD-10-CM

## 2020-08-26 DIAGNOSIS — Z82.61 FAMILY HISTORY OF ARTHRITIS: ICD-10-CM

## 2020-08-26 DIAGNOSIS — Z72.89 OTHER PROBLEMS RELATED TO LIFESTYLE: ICD-10-CM

## 2020-08-26 DIAGNOSIS — M25.562 PAIN IN LEFT KNEE: ICD-10-CM

## 2020-08-27 NOTE — ADDENDUM
[FreeTextEntry1] : This note was written by Brittany Aguayo on 08/26/2020 acting as scribe for Husam Olmedo III, MD

## 2020-08-27 NOTE — PHYSICAL EXAM
[de-identified] : Right knee:\par Knee: Range of Motion in Degrees	\par 	                  Claimant:	Normal:	\par Flexion Active	  135 	                135-degrees	\par Flexion Passive	  135	                135-degrees	\par Extension Active	  0-5	                0-5-degrees	\par Extension Passive	  0-5	                0-5-degrees	\par \par No weakness to flexion/extension.  No evidence of instability in the AP plane or varus or valgus stress.  Negative  Lachman.  Negative pivot shift.  Negative anterior drawer test.  Negative posterior drawer test.  Negative Elizabeth.  Negative Apley grind.  No medial or lateral joint line tenderness.  No tenderness over the medial and lateral facet of the patella.  No patellofemoral crepitations.  No lateral tilting patella.  No patellar apprehension.  No crepitation in the medial and lateral femoral condyle.  No proximal or distal swelling, edema or tenderness.  No gross motor or sensory deficits.  No intra-articular swelling.  2+ DP and PT pulses. No varus or valgus malalignment.  Skin is intact.  No rashes, scars or lesions.  \par  \par Left knee:\par Knee: Range of Motion in Degrees	\par 	                  Claimant:	Normal:	\par Flexion Active	  135 	                135-degrees	\par Flexion Passive	  135	                135-degrees	\par Extension Active	  0-5	                0-5-degrees	\par Extension Passive	  0-5	                0-5-degrees	\par \par No weakness to flexion/extension. No evidence of instability in the AP plane or varus or valgus stress.  Negative  Lachman.  Negative pivot shift.  Negative anterior drawer test.  Negative posterior drawer test.  Negative Elizabeth.  Negative Apley grind.  No medial or lateral joint line tenderness.  Positive tenderness over the medial and lateral facet of the patella.  Positive patellofemoral crepitations.  No lateral tilting patella.  No patella apprehension.  Positive crepitation in the medial and lateral femoral condyle.  No proximal or distal swelling, edema or tenderness.  No gross motor or sensory deficits. Moderate effusion.  2+ DP and PT pulses. No varus or valgus malalignment.  Skin is intact.  No rashes, scars or lesions.  [de-identified] : Gait: Slightly antalgic to antalgic gait.  Station: Normal  [de-identified] : Appearance: Well-developed, well-nourished male  in no acute distress.  [de-identified] : Radiographs, one to two views of the left knee, show moderate to early severe degenerative changes at the patellofemoral joint with moderate in the rest of the knee.

## 2020-08-27 NOTE — DISCUSSION/SUMMARY
[de-identified] : The patient presents with osteoarthritis, left knee.  At this time I am going to recommend an MRI to assess for the possibility of meniscal tear or occult fracture because of the severity of his complaints and failure to be relieved with his other conservative modalities.

## 2020-08-27 NOTE — PHYSICAL EXAM
[de-identified] : Right knee:\par Knee: Range of Motion in Degrees	\par 	                  Claimant:	Normal:	\par Flexion Active	  135 	                135-degrees	\par Flexion Passive	  135	                135-degrees	\par Extension Active	  0-5	                0-5-degrees	\par Extension Passive	  0-5	                0-5-degrees	\par \par No weakness to flexion/extension.  No evidence of instability in the AP plane or varus or valgus stress.  Negative  Lachman.  Negative pivot shift.  Negative anterior drawer test.  Negative posterior drawer test.  Negative Elizabeth.  Negative Apley grind.  No medial or lateral joint line tenderness.  No tenderness over the medial and lateral facet of the patella.  No patellofemoral crepitations.  No lateral tilting patella.  No patellar apprehension.  No crepitation in the medial and lateral femoral condyle.  No proximal or distal swelling, edema or tenderness.  No gross motor or sensory deficits.  No intra-articular swelling.  2+ DP and PT pulses. No varus or valgus malalignment.  Skin is intact.  No rashes, scars or lesions.  \par  \par Left knee:\par Knee: Range of Motion in Degrees	\par 	                  Claimant:	Normal:	\par Flexion Active	  135 	                135-degrees	\par Flexion Passive	  135	                135-degrees	\par Extension Active	  0-5	                0-5-degrees	\par Extension Passive	  0-5	                0-5-degrees	\par \par No weakness to flexion/extension. No evidence of instability in the AP plane or varus or valgus stress.  Negative  Lachman.  Negative pivot shift.  Negative anterior drawer test.  Negative posterior drawer test.  Negative Elizabeth.  Negative Apley grind.  No medial or lateral joint line tenderness.  Positive tenderness over the medial and lateral facet of the patella.  Positive patellofemoral crepitations.  No lateral tilting patella.  No patella apprehension.  Positive crepitation in the medial and lateral femoral condyle.  No proximal or distal swelling, edema or tenderness.  No gross motor or sensory deficits. Moderate effusion.  2+ DP and PT pulses. No varus or valgus malalignment.  Skin is intact.  No rashes, scars or lesions.  [de-identified] : Gait: Slightly antalgic to antalgic gait.  Station: Normal  [de-identified] : Appearance: Well-developed, well-nourished male  in no acute distress.  [de-identified] : Radiographs, one to two views of the left knee, show moderate to early severe degenerative changes at the patellofemoral joint with moderate in the rest of the knee.

## 2020-08-27 NOTE — HISTORY OF PRESENT ILLNESS
[10] : a current pain level of 10/10 [7] : the ailment interference is 7/10 [5] : the ailment interference is 5/10 [10  (interferes completely)] : the ailment interference is10/10 (interferes completely) [de-identified] : Howie comes in today with complaints of pain to his left knee.  He has not been seen in quite a while.  He was treated elsewhere with cortisone and lubricant injections.  His pain is getting so much worse.  The patient states the onset/injury occurred 6/20/17.  This injury is not work related or due to an automobile accident.  The patient states the pain is constant.  The patient describes the pain as burning.  The patient states rest, heat, ice and medication make the symptoms better while walking and bending make the symptoms worse.  [] : No [de-identified] : None [de-identified] : Meloxicam [de-identified] : Oxycodone

## 2020-08-27 NOTE — HISTORY OF PRESENT ILLNESS
[10] : a current pain level of 10/10 [5] : the ailment interference is 5/10 [7] : the ailment interference is 7/10 [10  (interferes completely)] : the ailment interference is10/10 (interferes completely) [de-identified] : Howie comes in today with complaints of pain to his left knee.  He has not been seen in quite a while.  He was treated elsewhere with cortisone and lubricant injections.  His pain is getting so much worse.  The patient states the onset/injury occurred 6/20/17.  This injury is not work related or due to an automobile accident.  The patient states the pain is constant.  The patient describes the pain as burning.  The patient states rest, heat, ice and medication make the symptoms better while walking and bending make the symptoms worse.  [] : No [de-identified] : None [de-identified] : Meloxicam [de-identified] : Oxycodone

## 2020-08-27 NOTE — DISCUSSION/SUMMARY
[de-identified] : The patient presents with osteoarthritis, left knee.  At this time I am going to recommend an MRI to assess for the possibility of meniscal tear or occult fracture because of the severity of his complaints and failure to be relieved with his other conservative modalities.

## 2020-08-31 ENCOUNTER — APPOINTMENT (OUTPATIENT)
Dept: ORTHOPEDIC SURGERY | Facility: CLINIC | Age: 65
End: 2020-08-31
Payer: MEDICARE

## 2020-08-31 VITALS
HEIGHT: 72 IN | BODY MASS INDEX: 24.79 KG/M2 | HEART RATE: 84 BPM | SYSTOLIC BLOOD PRESSURE: 139 MMHG | WEIGHT: 183 LBS | DIASTOLIC BLOOD PRESSURE: 89 MMHG

## 2020-08-31 PROCEDURE — 99214 OFFICE O/P EST MOD 30 MIN: CPT | Mod: 57

## 2020-08-31 PROCEDURE — 27530 TREAT KNEE FRACTURE: CPT | Mod: LT

## 2020-09-14 NOTE — ADDENDUM
[FreeTextEntry1] : This note was written by Ludy Molina on 09/04/2020 acting as scribe for Husam Olmedo III, MD

## 2020-09-14 NOTE — HISTORY OF PRESENT ILLNESS
[de-identified] : The patient comes in today for his left knee with persistence of his complaints.  We reviewed the MRI of the left knee, as noted below.

## 2020-09-14 NOTE — DISCUSSION/SUMMARY
[de-identified] : At this time, I have recommended a Playmaker brace, weightbearing as tolerated, for the left knee meniscal tear and fracture of the medial tibial plateau.  He will be reassessed in three to four weeks.\par \par The patient was prescribed a rigid support Playmaker knee brace with range of motion joints.  The brace will safely protect the patient and help to facilitate healing by stabilizing and controlling the knee.  In order to prevent skin breakdown along bony prominences and to avoid compression of the peroneal nerve, a custom fit is necessary.\par \par I declare responsibility and liability for caring for this fracture for the next 90 days.

## 2020-09-14 NOTE — PHYSICAL EXAM
[de-identified] : Ambulating with a slightly antalgic to antalgic gait.  Station:  Normal.  [de-identified] : Left Knee: \par Range of Motion in Degrees	\par 	                  Claimant:	Normal:	\par Flexion Active	  135 	               135-degrees	\par Flexion Passive	  135	               135-degrees	\par Extension Active	  0-5	               0-5-degrees	\par Extension Passive     0-5	               0-5-degrees	\par \par No weakness to flexion/extension.  No evidence of instability in the AP plane or varus or valgus stress.  Negative  Lachman.  Negative pivot shift.  Negative anterior drawer test.  Negative posterior drawer test.  Positive Elizabeth.  Positive Apley grind.  Positive medial joint line tenderness.  Negative lateral joint line tenderness.  Positive tenderness over the medial and lateral facet of the patella.  Positive patellofemoral crepitations.  No lateral tilting patella.  No patellar apprehension.  Positive crepitation in the medial and lateral femoral condyle.  No proximal or distal swelling, edema or tenderness.  No gross motor or sensory deficits.  Mild intra-articular swelling.  2+ DP and PT pulses.  No varus or valgus malalignment.  Skin is intact.  No rashes, scars or lesions. \par  [de-identified] : General Appearance:  Well-developed, well-nourished male in no acute distress. \par  [de-identified] : Review of the MRI of the left knee, although it was significantly suboptimal, shows a fracture of the medial tibial plateau and medial meniscal tear.

## 2020-10-22 ENCOUNTER — APPOINTMENT (OUTPATIENT)
Dept: ORTHOPEDIC SURGERY | Facility: CLINIC | Age: 65
End: 2020-10-22
Payer: MEDICARE

## 2020-10-22 VITALS
HEIGHT: 72 IN | WEIGHT: 190 LBS | SYSTOLIC BLOOD PRESSURE: 143 MMHG | DIASTOLIC BLOOD PRESSURE: 91 MMHG | TEMPERATURE: 97.3 F | BODY MASS INDEX: 25.73 KG/M2 | HEART RATE: 77 BPM

## 2020-10-22 DIAGNOSIS — S83.242A OTHER TEAR OF MEDIAL MENISCUS, CURRENT INJURY, LEFT KNEE, INITIAL ENCOUNTER: ICD-10-CM

## 2020-10-22 DIAGNOSIS — S82.142A DISPLACED BICONDYLAR FRACTURE OF LEFT TIBIA, INITIAL ENCOUNTER FOR CLOSED FRACTURE: ICD-10-CM

## 2020-10-22 PROCEDURE — 73560 X-RAY EXAM OF KNEE 1 OR 2: CPT | Mod: LT

## 2020-10-22 PROCEDURE — 99024 POSTOP FOLLOW-UP VISIT: CPT

## 2020-11-10 ENCOUNTER — OUTPATIENT (OUTPATIENT)
Dept: OUTPATIENT SERVICES | Facility: HOSPITAL | Age: 65
LOS: 1 days | End: 2020-11-10
Payer: MEDICARE

## 2020-11-10 VITALS
OXYGEN SATURATION: 98 % | RESPIRATION RATE: 18 BRPM | DIASTOLIC BLOOD PRESSURE: 91 MMHG | TEMPERATURE: 98 F | HEIGHT: 72 IN | SYSTOLIC BLOOD PRESSURE: 137 MMHG | HEART RATE: 69 BPM | WEIGHT: 194.01 LBS

## 2020-11-10 DIAGNOSIS — Z96.642 PRESENCE OF LEFT ARTIFICIAL HIP JOINT: Chronic | ICD-10-CM

## 2020-11-10 DIAGNOSIS — Z98.890 OTHER SPECIFIED POSTPROCEDURAL STATES: Chronic | ICD-10-CM

## 2020-11-10 DIAGNOSIS — Z96.641 PRESENCE OF RIGHT ARTIFICIAL HIP JOINT: Chronic | ICD-10-CM

## 2020-11-10 DIAGNOSIS — M17.12 UNILATERAL PRIMARY OSTEOARTHRITIS, LEFT KNEE: ICD-10-CM

## 2020-11-10 DIAGNOSIS — S83.242A OTHER TEAR OF MEDIAL MENISCUS, CURRENT INJURY, LEFT KNEE, INITIAL ENCOUNTER: ICD-10-CM

## 2020-11-10 LAB
ANION GAP SERPL CALC-SCNC: 5 MMOL/L — SIGNIFICANT CHANGE UP (ref 5–17)
APTT BLD: 36.7 SEC — HIGH (ref 27.5–35.5)
BASOPHILS # BLD AUTO: 0.01 K/UL — SIGNIFICANT CHANGE UP (ref 0–0.2)
BASOPHILS NFR BLD AUTO: 0.2 % — SIGNIFICANT CHANGE UP (ref 0–2)
BUN SERPL-MCNC: 14 MG/DL — SIGNIFICANT CHANGE UP (ref 7–23)
CALCIUM SERPL-MCNC: 9.7 MG/DL — SIGNIFICANT CHANGE UP (ref 8.5–10.1)
CHLORIDE SERPL-SCNC: 104 MMOL/L — SIGNIFICANT CHANGE UP (ref 96–108)
CO2 SERPL-SCNC: 31 MMOL/L — SIGNIFICANT CHANGE UP (ref 22–31)
CREAT SERPL-MCNC: 0.83 MG/DL — SIGNIFICANT CHANGE UP (ref 0.5–1.3)
EOSINOPHIL # BLD AUTO: 0 K/UL — SIGNIFICANT CHANGE UP (ref 0–0.5)
EOSINOPHIL NFR BLD AUTO: 0 % — SIGNIFICANT CHANGE UP (ref 0–6)
GLUCOSE SERPL-MCNC: 91 MG/DL — SIGNIFICANT CHANGE UP (ref 70–99)
HCT VFR BLD CALC: 44.5 % — SIGNIFICANT CHANGE UP (ref 39–50)
HGB BLD-MCNC: 14.4 G/DL — SIGNIFICANT CHANGE UP (ref 13–17)
IMM GRANULOCYTES NFR BLD AUTO: 0.2 % — SIGNIFICANT CHANGE UP (ref 0–1.5)
INR BLD: 0.98 RATIO — SIGNIFICANT CHANGE UP (ref 0.88–1.16)
LYMPHOCYTES # BLD AUTO: 1.01 K/UL — SIGNIFICANT CHANGE UP (ref 1–3.3)
LYMPHOCYTES # BLD AUTO: 19.2 % — SIGNIFICANT CHANGE UP (ref 13–44)
MCHC RBC-ENTMCNC: 30.4 PG — SIGNIFICANT CHANGE UP (ref 27–34)
MCHC RBC-ENTMCNC: 32.4 GM/DL — SIGNIFICANT CHANGE UP (ref 32–36)
MCV RBC AUTO: 93.9 FL — SIGNIFICANT CHANGE UP (ref 80–100)
MONOCYTES # BLD AUTO: 0.67 K/UL — SIGNIFICANT CHANGE UP (ref 0–0.9)
MONOCYTES NFR BLD AUTO: 12.7 % — SIGNIFICANT CHANGE UP (ref 2–14)
NEUTROPHILS # BLD AUTO: 3.56 K/UL — SIGNIFICANT CHANGE UP (ref 1.8–7.4)
NEUTROPHILS NFR BLD AUTO: 67.7 % — SIGNIFICANT CHANGE UP (ref 43–77)
PLATELET # BLD AUTO: 204 K/UL — SIGNIFICANT CHANGE UP (ref 150–400)
POTASSIUM SERPL-MCNC: 3.6 MMOL/L — SIGNIFICANT CHANGE UP (ref 3.5–5.3)
POTASSIUM SERPL-SCNC: 3.6 MMOL/L — SIGNIFICANT CHANGE UP (ref 3.5–5.3)
PROTHROM AB SERPL-ACNC: 11.4 SEC — SIGNIFICANT CHANGE UP (ref 10.6–13.6)
RBC # BLD: 4.74 M/UL — SIGNIFICANT CHANGE UP (ref 4.2–5.8)
RBC # FLD: 13.8 % — SIGNIFICANT CHANGE UP (ref 10.3–14.5)
SODIUM SERPL-SCNC: 140 MMOL/L — SIGNIFICANT CHANGE UP (ref 135–145)
WBC # BLD: 5.26 K/UL — SIGNIFICANT CHANGE UP (ref 3.8–10.5)
WBC # FLD AUTO: 5.26 K/UL — SIGNIFICANT CHANGE UP (ref 3.8–10.5)

## 2020-11-10 PROCEDURE — 85610 PROTHROMBIN TIME: CPT

## 2020-11-10 PROCEDURE — G0463: CPT | Mod: 25

## 2020-11-10 PROCEDURE — 85025 COMPLETE CBC W/AUTO DIFF WBC: CPT

## 2020-11-10 PROCEDURE — 85730 THROMBOPLASTIN TIME PARTIAL: CPT

## 2020-11-10 PROCEDURE — 93005 ELECTROCARDIOGRAM TRACING: CPT

## 2020-11-10 PROCEDURE — 36415 COLL VENOUS BLD VENIPUNCTURE: CPT

## 2020-11-10 PROCEDURE — 93010 ELECTROCARDIOGRAM REPORT: CPT

## 2020-11-10 PROCEDURE — 80048 BASIC METABOLIC PNL TOTAL CA: CPT

## 2020-11-10 RX ORDER — ALBUTEROL 90 UG/1
2 AEROSOL, METERED ORAL
Qty: 0 | Refills: 0 | DISCHARGE

## 2020-11-10 RX ORDER — MONTELUKAST 4 MG/1
1 TABLET, CHEWABLE ORAL
Qty: 0 | Refills: 0 | DISCHARGE

## 2020-11-10 NOTE — H&P PST ADULT - NSICDXPASTMEDICALHX_GEN_ALL_CORE_FT
PAST MEDICAL HISTORY:  Asthma     Diverticulosis of intestine without bleeding, unspecified intestinal tract location     Essential hypertension     Fatty liver     Hiatal hernia     History of kidney stones     Lumbar herniated disc     MVP (mitral valve prolapse)     Nasal polyps history of    Osteoarthritis left hip    Osteoarthritis of right hip, unspecified osteoarthritis type     Seasonal allergic rhinitis due to pollen     Tinnitus of both ears

## 2020-11-10 NOTE — H&P PST ADULT - ASSESSMENT
65 year old man presents to PST for preprocedure exam. Patient is for planned left knee arthroscopy / meniscectomy and debridement with Dr Olmedo.        Plan:  - PST instructions given ; NPO status instructions to be given by ASU .  - Pt instructed to take following meds with sip of water : amlodopine, metoprolol and cozaa/ advair  - Pt instructed to take routine evening medications unless indicated .  -  Stop NSAIDS ( Aspirin Alev Motrin Mobic Diclofenac), herbal supplements , MVI , Vitamin fish oil 7 days prior to surgery  unless   directed by surgeon or cardiologist;   - Medical Optimization  with Dr Gallego  - EZ wash instructions given  - Labs EKG as per surgeon request.   -  Pt instructed to self quarantine .  - Covid Testing scheduled on _____11/17____.  Pt notified and aware.  - Pt denies covid symptoms shortness of breath fever cough .

## 2020-11-10 NOTE — H&P PST ADULT - HEALTH CARE MAINTENANCE
flu vaccine - current    Denies travel outside of state or country in the last 14 days.   Denies contact with known Coronavirus positive person.  Denies fever, chills, cough, congestion, runny nose, SOB or difficulty breathing, fatigue, muscle or body ache, headache. loss of taste or smell, N/V/D.

## 2020-11-10 NOTE — H&P PST ADULT - NSICDXPASTSURGICALHX_GEN_ALL_CORE_FT
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

## 2020-11-10 NOTE — H&P PST ADULT - NEGATIVE NEUROLOGICAL SYMPTOMS
no paresthesias/no vertigo/no syncope/no transient paralysis/no weakness/no generalized seizures/no focal seizures

## 2020-11-10 NOTE — H&P PST ADULT - NSICDXFAMILYHX_GEN_ALL_CORE_FT
FAMILY HISTORY:  Father  Still living? No  Family history of multiple myeloma, Age at diagnosis: Age Unknown    Mother  Still living? No  Family history of pancreatic cancer, Age at diagnosis: Age Unknown

## 2020-11-10 NOTE — H&P PST ADULT - ANESTHESIA, PREVIOUS REACTION, PROFILE
Patient and her  called in requesting information to know if her insurance will pay for c/s if considered elective  States called hospital and instructed to call insurance  Called insurance and was told would be responsible for around $260+  Wants to know if in our experience they can expect any surprises from insurance like non payment for c/s  Requested to have note added to chart that MD agrees to c/s due to patient being petite and the size of baby  Aware would have to review that with MD at time of admission  Wants to know what they should do going forward, aware is up to them, I cannot guarantee or make decision for them  He said they have to proceed with c/s  Aware then is up to insurance ultimately if they will pay  Usually does get paid but in an instance where they deny we can write an appeal to the insurance  Other than that I cannot do much else or guarantee any payment  none

## 2020-11-10 NOTE — H&P PST ADULT - HISTORY OF PRESENT ILLNESS
65 year old man presents to [facility] for preprocedure exam. Patient is for planned left knee arthroscopy / meniscectomy and debridement with Dr Olmedo. Patient complaining of pain with activity and gets better with rest. He takes pain meds which has been helping but he feels he has gained weight because he is not as active due to pain.

## 2020-11-11 DIAGNOSIS — M17.12 UNILATERAL PRIMARY OSTEOARTHRITIS, LEFT KNEE: ICD-10-CM

## 2020-11-11 DIAGNOSIS — S83.242A OTHER TEAR OF MEDIAL MENISCUS, CURRENT INJURY, LEFT KNEE, INITIAL ENCOUNTER: ICD-10-CM

## 2020-11-17 ENCOUNTER — OUTPATIENT (OUTPATIENT)
Dept: OUTPATIENT SERVICES | Facility: HOSPITAL | Age: 65
LOS: 1 days | End: 2020-11-17
Payer: MEDICARE

## 2020-11-17 DIAGNOSIS — Z98.890 OTHER SPECIFIED POSTPROCEDURAL STATES: Chronic | ICD-10-CM

## 2020-11-17 DIAGNOSIS — Z11.59 ENCOUNTER FOR SCREENING FOR OTHER VIRAL DISEASES: ICD-10-CM

## 2020-11-17 DIAGNOSIS — Z96.641 PRESENCE OF RIGHT ARTIFICIAL HIP JOINT: Chronic | ICD-10-CM

## 2020-11-17 DIAGNOSIS — M17.12 UNILATERAL PRIMARY OSTEOARTHRITIS, LEFT KNEE: ICD-10-CM

## 2020-11-17 DIAGNOSIS — Z96.642 PRESENCE OF LEFT ARTIFICIAL HIP JOINT: Chronic | ICD-10-CM

## 2020-11-17 LAB — SARS-COV-2 RNA SPEC QL NAA+PROBE: SIGNIFICANT CHANGE UP

## 2020-11-17 PROCEDURE — U0003: CPT

## 2020-11-18 DIAGNOSIS — Z11.59 ENCOUNTER FOR SCREENING FOR OTHER VIRAL DISEASES: ICD-10-CM

## 2020-11-18 DIAGNOSIS — M17.12 UNILATERAL PRIMARY OSTEOARTHRITIS, LEFT KNEE: ICD-10-CM

## 2020-11-18 NOTE — DISCUSSION/SUMMARY
[de-identified] : At this time, due to medial meniscus tear on the left knee, the patient is going to have a surgical procedure for a medial meniscectomy.  All the risks and benefits of the surgery, including, but not limited to, anesthesia, infection, nerve and/or vascular injury and need for further surgery, were all discussed with the patient at length.  In light of these, patient wishes to proceed.  Patient will be scheduled at this time.  He is advised to ice in the meantime and he can remove the brace for the fracture.  \par \par ADDENDUM:\par This is an addendum to support documentation for EDGAR MONCADA.  \par \par The patient was seen in my office regarding left knee pain.  As part of that visit, I have recommended treatment with a ROM knee orthosis due to restrictions my patient is experiencing with functional limitation of daily activities.  This knee brace, in conjunction with other previously prescribed regimens, will assist in the overall treatment of my patient's condition.  I recommend that this brace be fit postoperatively.

## 2020-11-18 NOTE — PHYSICAL EXAM
[de-identified] : Left Knee: \par Range of Motion in Degrees	\par 	                  Claimant:	Normal:	\par Flexion Active	  135 	                135-degrees	\par Flexion Passive	  135	                135-degrees	\par Extension Active	  0-5	                0-5-degrees	\par Extension Passive	  0-5	                0-5-degrees	\par \par No weakness to flexion/extension.  No evidence of instability in the AP plane or varus or valgus stress.  Negative  Lachman.  Negative pivot shift.  Negative anterior drawer test.  Negative posterior drawer test.  Positive medial joint line tenderness.  Negative lateral joint line tenderness.  Positive Elizabeth.  Positive Apley grind.  No tenderness over the medial and lateral facet of the patella.  No patellofemoral crepitations.  No lateral tilting patella.  No patella apprehension.  No crepitation in the medial and lateral femoral condyle.  No proximal or distal swelling, edema or tenderness.  No gross motor or sensory deficits.  Mild intra-articular swelling.  2+ DP and PT pulses.  No varus or valgus malalignment.  Skin is intact.  No rashes, scars or lesions.   \par   [de-identified] : Ambulating with a slightly antalgic to antalgic gait.  Station:  Normal.  [de-identified] : Appearance:  Well-developed, well-nourished male in no acute distress.\par \par   [de-identified] : Radiographs, two views of the left knee, reveals a healed fracture and no other fractures or dislocations are noted.

## 2020-11-18 NOTE — ADDENDUM
[FreeTextEntry1] : This note was dictated by Jenny Truong, OTR/L, PA \par \par This note was written by Ramandeep Lora on 10/26/2020 acting as a scribe for JAXON LOPEZ III, MD

## 2020-11-18 NOTE — HISTORY OF PRESENT ILLNESS
[de-identified] : The patient comes in today with complaints of left knee pain.  He is status post an injury with a fracture, as well as a medial meniscus tear, but the fracture is healed and the patient is feeling great.  He states he has been taking the Playmaker brace off for the last month or so.

## 2020-11-19 RX ORDER — FENTANYL CITRATE 50 UG/ML
50 INJECTION INTRAVENOUS
Refills: 0 | Status: DISCONTINUED | OUTPATIENT
Start: 2020-11-20 | End: 2020-11-20

## 2020-11-19 RX ORDER — FENTANYL CITRATE 50 UG/ML
25 INJECTION INTRAVENOUS
Refills: 0 | Status: DISCONTINUED | OUTPATIENT
Start: 2020-11-20 | End: 2020-11-20

## 2020-11-19 RX ORDER — MEPERIDINE HYDROCHLORIDE 50 MG/ML
12.5 INJECTION INTRAMUSCULAR; INTRAVENOUS; SUBCUTANEOUS
Refills: 0 | Status: DISCONTINUED | OUTPATIENT
Start: 2020-11-20 | End: 2020-11-20

## 2020-11-19 RX ORDER — ONDANSETRON 8 MG/1
4 TABLET, FILM COATED ORAL ONCE
Refills: 0 | Status: DISCONTINUED | OUTPATIENT
Start: 2020-11-20 | End: 2020-11-20

## 2020-11-19 RX ORDER — SODIUM CHLORIDE 9 MG/ML
1000 INJECTION, SOLUTION INTRAVENOUS
Refills: 0 | Status: DISCONTINUED | OUTPATIENT
Start: 2020-11-20 | End: 2020-11-20

## 2020-11-20 ENCOUNTER — OUTPATIENT (OUTPATIENT)
Dept: INPATIENT UNIT | Facility: HOSPITAL | Age: 65
LOS: 1 days | Discharge: HOME | End: 2020-11-20
Payer: MEDICARE

## 2020-11-20 ENCOUNTER — RESULT REVIEW (OUTPATIENT)
Age: 65
End: 2020-11-20

## 2020-11-20 ENCOUNTER — APPOINTMENT (OUTPATIENT)
Dept: ORTHOPEDIC SURGERY | Facility: HOSPITAL | Age: 65
End: 2020-11-20
Payer: MEDICARE

## 2020-11-20 VITALS
SYSTOLIC BLOOD PRESSURE: 140 MMHG | RESPIRATION RATE: 16 BRPM | HEIGHT: 72 IN | HEART RATE: 76 BPM | DIASTOLIC BLOOD PRESSURE: 95 MMHG | OXYGEN SATURATION: 96 % | TEMPERATURE: 97 F | WEIGHT: 192.02 LBS

## 2020-11-20 VITALS
HEART RATE: 60 BPM | DIASTOLIC BLOOD PRESSURE: 82 MMHG | RESPIRATION RATE: 18 BRPM | OXYGEN SATURATION: 99 % | SYSTOLIC BLOOD PRESSURE: 124 MMHG

## 2020-11-20 DIAGNOSIS — S83.242A OTHER TEAR OF MEDIAL MENISCUS, CURRENT INJURY, LEFT KNEE, INITIAL ENCOUNTER: ICD-10-CM

## 2020-11-20 DIAGNOSIS — Z98.890 OTHER SPECIFIED POSTPROCEDURAL STATES: Chronic | ICD-10-CM

## 2020-11-20 DIAGNOSIS — Z96.641 PRESENCE OF RIGHT ARTIFICIAL HIP JOINT: Chronic | ICD-10-CM

## 2020-11-20 DIAGNOSIS — Z96.642 PRESENCE OF LEFT ARTIFICIAL HIP JOINT: Chronic | ICD-10-CM

## 2020-11-20 DIAGNOSIS — M17.12 UNILATERAL PRIMARY OSTEOARTHRITIS, LEFT KNEE: ICD-10-CM

## 2020-11-20 PROCEDURE — 88304 TISSUE EXAM BY PATHOLOGIST: CPT

## 2020-11-20 PROCEDURE — 29881 ARTHRS KNE SRG MNISECTMY M/L: CPT | Mod: 78,LT

## 2020-11-20 PROCEDURE — 88304 TISSUE EXAM BY PATHOLOGIST: CPT | Mod: 26

## 2020-11-20 RX ORDER — OXYCODONE HYDROCHLORIDE 5 MG/1
5 TABLET ORAL ONCE
Refills: 0 | Status: DISCONTINUED | OUTPATIENT
Start: 2020-11-20 | End: 2020-11-20

## 2020-11-20 RX ORDER — FAMOTIDINE 10 MG/ML
20 INJECTION INTRAVENOUS ONCE
Refills: 0 | Status: COMPLETED | OUTPATIENT
Start: 2020-11-20 | End: 2020-11-20

## 2020-11-20 RX ORDER — CELECOXIB 200 MG/1
200 CAPSULE ORAL ONCE
Refills: 0 | Status: COMPLETED | OUTPATIENT
Start: 2020-11-20 | End: 2020-11-20

## 2020-11-20 RX ORDER — ACETAMINOPHEN 500 MG
975 TABLET ORAL ONCE
Refills: 0 | Status: COMPLETED | OUTPATIENT
Start: 2020-11-20 | End: 2020-11-20

## 2020-11-20 RX ADMIN — FENTANYL CITRATE 50 MICROGRAM(S): 50 INJECTION INTRAVENOUS at 09:12

## 2020-11-20 RX ADMIN — FENTANYL CITRATE 50 MICROGRAM(S): 50 INJECTION INTRAVENOUS at 09:02

## 2020-11-20 RX ADMIN — FENTANYL CITRATE 50 MICROGRAM(S): 50 INJECTION INTRAVENOUS at 08:52

## 2020-11-20 RX ADMIN — FENTANYL CITRATE 50 MICROGRAM(S): 50 INJECTION INTRAVENOUS at 09:01

## 2020-11-20 RX ADMIN — OXYCODONE HYDROCHLORIDE 5 MILLIGRAM(S): 5 TABLET ORAL at 09:12

## 2020-11-20 RX ADMIN — Medication 975 MILLIGRAM(S): at 07:54

## 2020-11-20 RX ADMIN — CELECOXIB 200 MILLIGRAM(S): 200 CAPSULE ORAL at 07:54

## 2020-11-20 RX ADMIN — FAMOTIDINE 20 MILLIGRAM(S): 10 INJECTION INTRAVENOUS at 07:54

## 2020-11-20 NOTE — ASU DISCHARGE PLAN (ADULT/PEDIATRIC) - NURSING INSTRUCTIONS
Refer to the multicolored fact sheet for any problems you experience. If you have difficulty urinating or have not urinated within 8 hours of discharge call your MD..ice A responsible adult should be with you for the rest of the day and night for your safety.

## 2020-11-20 NOTE — ASU DISCHARGE PLAN (ADULT/PEDIATRIC) - CARE PROVIDER_API CALL
Husam Olmedo  ORTHOPAEDIC SURGERY  [address] [place] [id number]  Phone: [phone]  Fax: [phone]  Follow Up Time:

## 2020-11-20 NOTE — ASU DISCHARGE PLAN (ADULT/PEDIATRIC) - ASU DC SPECIAL INSTRUCTIONSFT
Knee Arthroscopy Instructions    1) Your knee will swell over the next 48hours and you can expect pain to get a bit worse. Ice your Knee plenty, continuously if possible. Fill up a plastic bag, then wrap it in a towel or pillow case.     2) Elevate your leg above your heart with about 3 pillows when you can, when you are in bed or chair. Otherwise you should be up and about, walking as much as you can tolereate. The more you move the better you will do. Weight bearing as tolerated.    3) Expect some bloody drainage. It is normal. It may even soak through the gauze and ACE bandage and look bloody. This is mostly leftover Saline fluid coming out of your knee from surgery. Even a drop of blood can make it look very bloody. Just place another Gauze and another ACE over it and wrap it snug but not overly tight. If it bleeds through the second bandage again, call.    4) Remove bandage in 48hrs and place waterproof band aides. You can shower in 48 hours. No bath. Pat your incisions dry. No creams, no lotions.    5) Only reason to worry would be if pain got so severe that you cannot feel or wiggle your toes, or if your foot is cold. In this case you need to call or come to the ER. But as long as you can feel and wiggle your toes, you are fine.    6) Call the office to schedule a follow up appointment to be seen in 10-14 days. Your Sutures will be removed at that point.    7) A pain Rx is in the chart; fill it on your way home. OR was sent electronically to your pharmacy, pick it up on the way home.

## 2020-11-20 NOTE — ASU PREOP CHECKLIST - AS TEMP SITE
CHIEF COMPLAINT:  Chief Complaint   Patient presents with   • Well Child     well child exam.was eval by birth to three when [age]- has speech delay- and she wants ref to [facility] for [facility]   • Circumcision     Circumcision healed funny/ wants to have it checked       HISTORY OF PRESENT ILLNESS:  Hayder is a 31 month old male who presents for a well-child-exam.  The patient is brought in by his mother today.     Issues raised:  There has been no fever, runny nose, cough, vomiting, diarrhea, eye drainage or redness, rash, or respiratory distress.    Speech delay:  Mother states that she is concerned with Hayder's lack of speech and intelligibility.  She states he maybe can name 5 different options. She has contacted birth to 3 and Hayder was under their care with regular visits but mother did not seem like this was helping.  She states it did increase his babbling but not his willingness to use communication. He was also undergoing chiropractic care who suggested possible further evaluation. He also shows signs of being uncomfortable around new people and mother states it takes multiple times for him to see someone and interact with them before he tends to open up.  She has not had him evaluated further than birth to three.    Circumcision: Mother states that where patient was circumcised it seems like the skin has adhered to the head of patient's penis and was told by other providers he more than likely would just grow into it and not worry about it.  Her  is concerned with this and would like it evaluated.  Nutrition.   · Milk intake greater than 24 ounces daily: NO  · Iron intake Yes pretty good not too picky of an eater.  · WIC: NO  · Live in or visit a home or  facility that was built before 1960 and is in poor repair or was renovated in the past six months: NO  · We discussed continuing with a variety of fruits and vegetables and good calcium intake.    · We discussed transitioning to a lower fat  content cow's milk.    Elimination.   · Urinating and stooling fine.    · No trouble with constipation.    · We discussed toilet training. She has tried but Hayder seems to not be interested at this time.    Sleep.   · Sleeping well at night, about 12 hours.  ·  No napping during the day   · Does not snore or breath loudly.    Developmental Screening       (PEDS-DM):   Do you have any concerns about your child's development? Yes  Do you have any concerns about your child's hearing? No   Do you have any concerns about your child's vision or eyes crossing? No   Can your child scribble? Yes  Can your child point to 3 or body parts when asked? Yes  If you show your child an object, can he name more than 5 things? Yes  Does your child walk up stairs without holding onto the railing?  Yes  Does your child help  his toys?  Very seldom  Does your child use two toys together such as taking a doll for a ride or having a truck carry things? Yes    Water supply/Fluoride. Yes    There is no problem list on file for this patient.      Social History     Social History Narrative   • Not on file     Primary caretakers: Mom and dad  Siblings:No siblings  Smoke exposure: none  : None, mom is a stay at home mom  Pets:No      MEDICATIONS:  No outpatient medications have been marked as taking for the 4/24/19 encounter (Office Visit) with WADE Brown.       ALLERGIES:  Allergies not on file    PHYSICAL EXAM:  Pulse 98, height 3' 1.5\" (0.953 m), weight (!) 17.4 kg, head circumference 48 cm (18.9\").  98 %ile (Z= 2.07) based on CDC (Boys, 2-20 Years) weight-for-age data using vitals from 4/24/2019.  81 %ile (Z= 0.87) based on CDC (Boys, 2-20 Years) Stature-for-age data based on Stature recorded on 4/24/2019.  19 %ile (Z= -0.87) based on CDC (Boys, 0-36 Months) head circumference-for-age based on Head Circumference recorded on 4/24/2019.  GEN:  The patient is an awake, alert, male. Nontoxic.  Minimal eye contact.  He  is pacing around room and mother is unable to hold patient in her lap.  He swats away at her and myself while trying to do an examination.  Due to this a limited examination was conducted.   HEAD:  Normocephalic, atraumatic.   EYES:  Red reflex seen bilaterally. Conjunctiva without injection or icterus.  EOMI.  EARS:  Unable to assess.  MOUTH/ THROAT:   Unable to assess.  NECK:  Unable to assess.  HEART:  Regular rate and rhythm. Normal S1, S2.  No murmurs, rubs, gallops.   LUNGS:  Clear to auscultation bilaterally.  No wheezes, rales, rhonchi.  Normal work of breathing.  ABD:  Unable to assess.  :  Clarence 1 male and Testes descended bilaterally. There is evidence of adhesions around base of the head of the penis where circumcision was performed.  EXT:  Warm, dry, without abnormalities.  NEURO:  Normal tone, bulk, strength.  SKIN:  Unable to assess to full extent.    ASSESSMENT AND PLAN:  1.(Z00.121) Encounter for routine child health examination with abnormal findings  (primary encounter diagnosis)  Plan: see below.    2.(F80.9) Speech delay  Plan: SERVICE TO SPEECH THERAPY  --service to OT was also placed.  --mother was instructed to follow up with me if she feels like this program is not helpful or if she would like further evaluation into some of his behaviors by neuropsych.    3.(R62.0) Delayed milestone in childhood  Plan: SERVICE TO OCCUPATIONAL THERAPY, SERVICE TO         SPEECH THERAPY  --suggested that if Hayder is meeting someone new or not going to be around someone that he has seen in awhile it may be beneficial to have a picture of that person and start showing it to him a couple of weeks prior to this introduction to help with the adjustment.    4.(E66.9,  Z68.54) BMI (body mass index), pediatric 95-99% for age, obese child structured weight management/multidisciplinary intervention category  Plan:  Mother was instructed to continue to provide healthy table options for Hayder.  --encouraged regular  activity for him now that the summer months are here and he may be able to run and play more outside.    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  --she was informed that more than likely it would not effect him however if they would want a second opinion we could get them to see a pediatric urologist because they would be the ones that would need to perform a circumcision if it is needing to be repeated.  Patient will follow up after discussing with .      Hayder is an otherwise healthy 1  Yrs 7  Mos male here for well-child check.  1. Growth:  The patient shows appropriate growth in all parameters.  The patient's Body mass index is 19.15 kg/m²..  This places the patient at the 97 %ile (Z= 1.95) based on CDC (Boys, 2-20 Years) BMI-for-age based on BMI available as of 4/24/2019.. Will continue to monitor.  2. Vaccinations: Mother requests titers for vaccinations if available.  We discussed that there are titers for more but would this be more traumatic for Hayder considering his current condition and would he be able to tolerate sitting for blood draw.  Mother was informed that I would call her back with what titers for sure could be done and will wait for her response in how she would like to move forward.  Declines immunizations today.  She is waiting for last MMR until he is 2 and all other is unsure.  3. Lead: no risk factors.  4. Will follow up at a 2 year old well-child check or sooner as needed should other issues arise.      WADE Brown  [facility]  [address] [address] [phone]    My collaborating providers are Dr. Trejo and Dr. Sotomayor.     oral

## 2020-11-20 NOTE — ASU DISCHARGE PLAN (ADULT/PEDIATRIC) - CALL YOUR DOCTOR IF YOU HAVE ANY OF THE FOLLOWING:
Bleeding that does not stop/Fever greater than (need to indicate Fahrenheit or Celsius)/Pain not relieved by Medications/Swelling that gets worse/Wound/Surgical Site with redness, or foul smelling discharge or pus/Numbness, tingling, color or temperature change to extremity

## 2020-11-24 ENCOUNTER — OUTPATIENT (OUTPATIENT)
Dept: OUTPATIENT SERVICES | Facility: HOSPITAL | Age: 65
LOS: 1 days | End: 2020-11-24
Payer: MEDICARE

## 2020-11-24 ENCOUNTER — APPOINTMENT (OUTPATIENT)
Dept: ULTRASOUND IMAGING | Facility: CLINIC | Age: 65
End: 2020-11-24
Payer: MEDICARE

## 2020-11-24 ENCOUNTER — NON-APPOINTMENT (OUTPATIENT)
Age: 65
End: 2020-11-24

## 2020-11-24 DIAGNOSIS — Z96.642 PRESENCE OF LEFT ARTIFICIAL HIP JOINT: Chronic | ICD-10-CM

## 2020-11-24 DIAGNOSIS — M79.662 PAIN IN LEFT LOWER LEG: ICD-10-CM

## 2020-11-24 DIAGNOSIS — Z96.641 PRESENCE OF RIGHT ARTIFICIAL HIP JOINT: Chronic | ICD-10-CM

## 2020-11-24 DIAGNOSIS — Z98.890 OTHER SPECIFIED POSTPROCEDURAL STATES: Chronic | ICD-10-CM

## 2020-11-24 PROCEDURE — 93971 EXTREMITY STUDY: CPT | Mod: 26,LT

## 2020-11-24 PROCEDURE — 93971 EXTREMITY STUDY: CPT

## 2020-11-26 DIAGNOSIS — J45.909 UNSPECIFIED ASTHMA, UNCOMPLICATED: ICD-10-CM

## 2020-11-26 DIAGNOSIS — M17.12 UNILATERAL PRIMARY OSTEOARTHRITIS, LEFT KNEE: ICD-10-CM

## 2020-11-26 DIAGNOSIS — I34.1 NONRHEUMATIC MITRAL (VALVE) PROLAPSE: ICD-10-CM

## 2020-11-26 DIAGNOSIS — Z96.643 PRESENCE OF ARTIFICIAL HIP JOINT, BILATERAL: ICD-10-CM

## 2020-11-26 DIAGNOSIS — I10 ESSENTIAL (PRIMARY) HYPERTENSION: ICD-10-CM

## 2020-11-26 DIAGNOSIS — Z79.82 LONG TERM (CURRENT) USE OF ASPIRIN: ICD-10-CM

## 2020-11-26 DIAGNOSIS — M23.222 DERANGEMENT OF POSTERIOR HORN OF MEDIAL MENISCUS DUE TO OLD TEAR OR INJURY, LEFT KNEE: ICD-10-CM

## 2020-11-26 DIAGNOSIS — M94.8X1 OTHER SPECIFIED DISORDERS OF CARTILAGE, SHOULDER: ICD-10-CM

## 2020-11-26 DIAGNOSIS — E78.2 MIXED HYPERLIPIDEMIA: ICD-10-CM

## 2020-11-26 DIAGNOSIS — M65.9 SYNOVITIS AND TENOSYNOVITIS, UNSPECIFIED: ICD-10-CM

## 2020-11-26 DIAGNOSIS — M23.204 DERANGEMENT OF UNSPECIFIED MEDIAL MENISCUS DUE TO OLD TEAR OR INJURY, LEFT KNEE: ICD-10-CM

## 2020-12-02 ENCOUNTER — APPOINTMENT (OUTPATIENT)
Dept: ORTHOPEDIC SURGERY | Facility: CLINIC | Age: 65
End: 2020-12-02
Payer: MEDICARE

## 2020-12-02 VITALS
BODY MASS INDEX: 25.73 KG/M2 | WEIGHT: 190 LBS | SYSTOLIC BLOOD PRESSURE: 141 MMHG | DIASTOLIC BLOOD PRESSURE: 89 MMHG | HEIGHT: 72 IN | TEMPERATURE: 98.8 F | HEART RATE: 82 BPM

## 2020-12-02 DIAGNOSIS — Z98.890 OTHER SPECIFIED POSTPROCEDURAL STATES: ICD-10-CM

## 2020-12-02 PROCEDURE — 99024 POSTOP FOLLOW-UP VISIT: CPT

## 2020-12-03 NOTE — ADDENDUM
[FreeTextEntry1] : This note was dictated by Jenny Truong, OTR/L, PA. \par \par This note was written by Brittany Aguayo on 12/03/2020 acting as scribe for Husam Olmedo III, MD

## 2020-12-03 NOTE — HISTORY OF PRESENT ILLNESS
[de-identified] : Status post left knee scope [de-identified] : Howie comes in today status post a left knee scope.  He states that he is feeling much better.  He does complain of some pain down the side of the proximal fibula.  He recently had a sonogram, which we ordered, which was negative.  But he states it is getting better. [de-identified] : Left knee:\par The stitches are fully in place.  There are no signs of any infection.  No calf tenderness. Good distal pulses.  \par Knee: Range of Motion in Degrees	\par 	                  Claimant:	Normal:	\par Flexion Active	  135 	                135-degrees	\par Flexion Passive	  135	                135-degrees	\par Extension Active	  0-5	                0-5-degrees	\par Extension Passive	  0-5	                0-5-degrees	  [de-identified] : The patient presents status post a left knee scope.  The patient had the stitches removed.  He is advised to keep it clean, dry and intact and use Vitamin E oil.  He is advised to return to the office as needed.  He is going to do physical therapy virtually.

## 2020-12-23 NOTE — DISCHARGE NOTE ADULT - NSCORESITESY/N_GEN_A_CORE_RD
pts BP is lower, no swelling to groin site, no bleeding, site is soft. No c/o dizziness, CP or SOB. Will continue to monitor   No

## 2021-01-06 ENCOUNTER — OUTPATIENT (OUTPATIENT)
Dept: OUTPATIENT SERVICES | Facility: HOSPITAL | Age: 66
LOS: 1 days | End: 2021-01-06
Payer: MEDICARE

## 2021-01-06 ENCOUNTER — APPOINTMENT (OUTPATIENT)
Dept: CT IMAGING | Facility: CLINIC | Age: 66
End: 2021-01-06
Payer: MEDICARE

## 2021-01-06 DIAGNOSIS — Z96.642 PRESENCE OF LEFT ARTIFICIAL HIP JOINT: Chronic | ICD-10-CM

## 2021-01-06 DIAGNOSIS — Z98.890 OTHER SPECIFIED POSTPROCEDURAL STATES: Chronic | ICD-10-CM

## 2021-01-06 DIAGNOSIS — Z00.8 ENCOUNTER FOR OTHER GENERAL EXAMINATION: ICD-10-CM

## 2021-01-06 DIAGNOSIS — Z96.641 PRESENCE OF RIGHT ARTIFICIAL HIP JOINT: Chronic | ICD-10-CM

## 2021-01-06 PROCEDURE — 70486 CT MAXILLOFACIAL W/O DYE: CPT | Mod: 26

## 2021-01-06 PROCEDURE — 70486 CT MAXILLOFACIAL W/O DYE: CPT

## 2021-01-11 ENCOUNTER — OUTPATIENT (OUTPATIENT)
Dept: OUTPATIENT SERVICES | Facility: HOSPITAL | Age: 66
LOS: 1 days | End: 2021-01-11
Payer: MEDICARE

## 2021-01-11 DIAGNOSIS — Z01.818 ENCOUNTER FOR OTHER PREPROCEDURAL EXAMINATION: ICD-10-CM

## 2021-01-11 DIAGNOSIS — Z98.890 OTHER SPECIFIED POSTPROCEDURAL STATES: Chronic | ICD-10-CM

## 2021-01-11 DIAGNOSIS — Z96.641 PRESENCE OF RIGHT ARTIFICIAL HIP JOINT: Chronic | ICD-10-CM

## 2021-01-11 DIAGNOSIS — J32.0 CHRONIC MAXILLARY SINUSITIS: ICD-10-CM

## 2021-01-11 DIAGNOSIS — Z96.642 PRESENCE OF LEFT ARTIFICIAL HIP JOINT: Chronic | ICD-10-CM

## 2021-01-11 LAB
ANION GAP SERPL CALC-SCNC: 6 MMOL/L — SIGNIFICANT CHANGE UP (ref 5–17)
APTT BLD: 35.3 SEC — SIGNIFICANT CHANGE UP (ref 27.5–35.5)
BASOPHILS # BLD AUTO: 0.01 K/UL — SIGNIFICANT CHANGE UP (ref 0–0.2)
BASOPHILS NFR BLD AUTO: 0.2 % — SIGNIFICANT CHANGE UP (ref 0–2)
BUN SERPL-MCNC: 14 MG/DL — SIGNIFICANT CHANGE UP (ref 7–23)
CALCIUM SERPL-MCNC: 9.4 MG/DL — SIGNIFICANT CHANGE UP (ref 8.5–10.1)
CHLORIDE SERPL-SCNC: 105 MMOL/L — SIGNIFICANT CHANGE UP (ref 96–108)
CO2 SERPL-SCNC: 29 MMOL/L — SIGNIFICANT CHANGE UP (ref 22–31)
CREAT SERPL-MCNC: 0.89 MG/DL — SIGNIFICANT CHANGE UP (ref 0.5–1.3)
EOSINOPHIL # BLD AUTO: 0 K/UL — SIGNIFICANT CHANGE UP (ref 0–0.5)
EOSINOPHIL NFR BLD AUTO: 0 % — SIGNIFICANT CHANGE UP (ref 0–6)
GLUCOSE SERPL-MCNC: 99 MG/DL — SIGNIFICANT CHANGE UP (ref 70–99)
HCT VFR BLD CALC: 43.9 % — SIGNIFICANT CHANGE UP (ref 39–50)
HGB BLD-MCNC: 14.7 G/DL — SIGNIFICANT CHANGE UP (ref 13–17)
IMM GRANULOCYTES NFR BLD AUTO: 0.2 % — SIGNIFICANT CHANGE UP (ref 0–1.5)
INR BLD: 1.03 RATIO — SIGNIFICANT CHANGE UP (ref 0.88–1.16)
LYMPHOCYTES # BLD AUTO: 0.77 K/UL — LOW (ref 1–3.3)
LYMPHOCYTES # BLD AUTO: 14.7 % — SIGNIFICANT CHANGE UP (ref 13–44)
MCHC RBC-ENTMCNC: 30.4 PG — SIGNIFICANT CHANGE UP (ref 27–34)
MCHC RBC-ENTMCNC: 33.5 GM/DL — SIGNIFICANT CHANGE UP (ref 32–36)
MCV RBC AUTO: 90.9 FL — SIGNIFICANT CHANGE UP (ref 80–100)
MONOCYTES # BLD AUTO: 0.71 K/UL — SIGNIFICANT CHANGE UP (ref 0–0.9)
MONOCYTES NFR BLD AUTO: 13.5 % — SIGNIFICANT CHANGE UP (ref 2–14)
NEUTROPHILS # BLD AUTO: 3.74 K/UL — SIGNIFICANT CHANGE UP (ref 1.8–7.4)
NEUTROPHILS NFR BLD AUTO: 71.4 % — SIGNIFICANT CHANGE UP (ref 43–77)
PLATELET # BLD AUTO: 201 K/UL — SIGNIFICANT CHANGE UP (ref 150–400)
POTASSIUM SERPL-MCNC: 3.3 MMOL/L — LOW (ref 3.5–5.3)
POTASSIUM SERPL-SCNC: 3.3 MMOL/L — LOW (ref 3.5–5.3)
PROTHROM AB SERPL-ACNC: 11.9 SEC — SIGNIFICANT CHANGE UP (ref 10.6–13.6)
RBC # BLD: 4.83 M/UL — SIGNIFICANT CHANGE UP (ref 4.2–5.8)
RBC # FLD: 13.6 % — SIGNIFICANT CHANGE UP (ref 10.3–14.5)
SODIUM SERPL-SCNC: 140 MMOL/L — SIGNIFICANT CHANGE UP (ref 135–145)
WBC # BLD: 5.24 K/UL — SIGNIFICANT CHANGE UP (ref 3.8–10.5)
WBC # FLD AUTO: 5.24 K/UL — SIGNIFICANT CHANGE UP (ref 3.8–10.5)

## 2021-01-11 PROCEDURE — 85730 THROMBOPLASTIN TIME PARTIAL: CPT

## 2021-01-11 PROCEDURE — 71046 X-RAY EXAM CHEST 2 VIEWS: CPT

## 2021-01-11 PROCEDURE — 85610 PROTHROMBIN TIME: CPT

## 2021-01-11 PROCEDURE — 93005 ELECTROCARDIOGRAM TRACING: CPT

## 2021-01-11 PROCEDURE — 36415 COLL VENOUS BLD VENIPUNCTURE: CPT

## 2021-01-11 PROCEDURE — 85025 COMPLETE CBC W/AUTO DIFF WBC: CPT

## 2021-01-11 PROCEDURE — 71046 X-RAY EXAM CHEST 2 VIEWS: CPT | Mod: 26

## 2021-01-11 PROCEDURE — 93010 ELECTROCARDIOGRAM REPORT: CPT

## 2021-01-11 PROCEDURE — 80048 BASIC METABOLIC PNL TOTAL CA: CPT

## 2021-01-11 RX ORDER — ALPRAZOLAM 0.25 MG
1 TABLET ORAL
Qty: 0 | Refills: 0 | DISCHARGE

## 2021-01-11 RX ORDER — LOSARTAN POTASSIUM 100 MG/1
1 TABLET, FILM COATED ORAL
Qty: 0 | Refills: 0 | DISCHARGE

## 2021-01-11 NOTE — CHART NOTE - NSCHARTNOTEFT_GEN_A_CORE
Vital Signs  Pulse 88  Temperature 98.2  Respiration 16  B/P 143/84  SpO2 97%    Plan   1. NPO after midnight  2. Take the following medications with sips of water on the day of procedure: Amlodipine, Metoprolol. Use Advair Diskus  3. Drink a quart of extra  fluids the day before your surgery.  4. CBC, BMP,  PT/ INR and PTT sent to lab  5. EKG and Chest x- ray done  6. Covid swab scheduled for 1/17/2021

## 2021-01-11 NOTE — ASU PATIENT PROFILE, ADULT - PSH
H/O arthroscopy of shoulder  left 2/ 2016  H/O hernia repair  11/3/2016  H/O lithotripsy  2015  H/O sinus surgery  x 3  History of hip replacement, total, right  07/2017  History of total hip replacement, left    S/P foot surgery, right  right ankle 1979  S/P left knee arthroscopy  11/ 2021

## 2021-01-12 DIAGNOSIS — Z01.818 ENCOUNTER FOR OTHER PREPROCEDURAL EXAMINATION: ICD-10-CM

## 2021-01-12 DIAGNOSIS — J32.0 CHRONIC MAXILLARY SINUSITIS: ICD-10-CM

## 2021-01-16 DIAGNOSIS — Z01.818 ENCOUNTER FOR OTHER PREPROCEDURAL EXAMINATION: ICD-10-CM

## 2021-01-17 ENCOUNTER — APPOINTMENT (OUTPATIENT)
Dept: DISASTER EMERGENCY | Facility: CLINIC | Age: 66
End: 2021-01-17

## 2021-01-19 LAB — SARS-COV-2 N GENE NPH QL NAA+PROBE: NOT DETECTED

## 2021-01-19 RX ORDER — SODIUM CHLORIDE 9 MG/ML
1000 INJECTION, SOLUTION INTRAVENOUS
Refills: 0 | Status: DISCONTINUED | OUTPATIENT
Start: 2021-01-20 | End: 2021-01-20

## 2021-01-19 RX ORDER — MEPERIDINE HYDROCHLORIDE 50 MG/ML
12.5 INJECTION INTRAMUSCULAR; INTRAVENOUS; SUBCUTANEOUS
Refills: 0 | Status: DISCONTINUED | OUTPATIENT
Start: 2021-01-20 | End: 2021-01-20

## 2021-01-19 RX ORDER — FENTANYL CITRATE 50 UG/ML
25 INJECTION INTRAVENOUS
Refills: 0 | Status: DISCONTINUED | OUTPATIENT
Start: 2021-01-20 | End: 2021-01-20

## 2021-01-19 RX ORDER — FENTANYL CITRATE 50 UG/ML
50 INJECTION INTRAVENOUS
Refills: 0 | Status: DISCONTINUED | OUTPATIENT
Start: 2021-01-20 | End: 2021-01-20

## 2021-01-19 RX ORDER — OXYCODONE HYDROCHLORIDE 5 MG/1
10 TABLET ORAL ONCE
Refills: 0 | Status: DISCONTINUED | OUTPATIENT
Start: 2021-01-20 | End: 2021-01-20

## 2021-01-20 ENCOUNTER — OUTPATIENT (OUTPATIENT)
Dept: INPATIENT UNIT | Facility: HOSPITAL | Age: 66
LOS: 1 days | Discharge: HOME | End: 2021-01-20
Payer: MEDICARE

## 2021-01-20 ENCOUNTER — RESULT REVIEW (OUTPATIENT)
Age: 66
End: 2021-01-20

## 2021-01-20 VITALS
HEIGHT: 71 IN | RESPIRATION RATE: 16 BRPM | WEIGHT: 199.96 LBS | SYSTOLIC BLOOD PRESSURE: 133 MMHG | HEART RATE: 70 BPM | DIASTOLIC BLOOD PRESSURE: 95 MMHG | OXYGEN SATURATION: 97 % | TEMPERATURE: 97 F

## 2021-01-20 VITALS
RESPIRATION RATE: 18 BRPM | DIASTOLIC BLOOD PRESSURE: 82 MMHG | HEART RATE: 60 BPM | SYSTOLIC BLOOD PRESSURE: 130 MMHG | OXYGEN SATURATION: 98 % | TEMPERATURE: 97 F

## 2021-01-20 DIAGNOSIS — Z98.890 OTHER SPECIFIED POSTPROCEDURAL STATES: Chronic | ICD-10-CM

## 2021-01-20 DIAGNOSIS — Z96.642 PRESENCE OF LEFT ARTIFICIAL HIP JOINT: Chronic | ICD-10-CM

## 2021-01-20 DIAGNOSIS — J32.2 CHRONIC ETHMOIDAL SINUSITIS: ICD-10-CM

## 2021-01-20 DIAGNOSIS — J32.0 CHRONIC MAXILLARY SINUSITIS: ICD-10-CM

## 2021-01-20 DIAGNOSIS — Z96.641 PRESENCE OF RIGHT ARTIFICIAL HIP JOINT: Chronic | ICD-10-CM

## 2021-01-20 PROCEDURE — C1889: CPT

## 2021-01-20 PROCEDURE — C2625: CPT

## 2021-01-20 PROCEDURE — 88304 TISSUE EXAM BY PATHOLOGIST: CPT | Mod: 26

## 2021-01-20 PROCEDURE — 88304 TISSUE EXAM BY PATHOLOGIST: CPT

## 2021-01-20 RX ORDER — AMLODIPINE BESYLATE 2.5 MG/1
1 TABLET ORAL
Qty: 0 | Refills: 0 | DISCHARGE

## 2021-01-20 RX ORDER — FLUTICASONE PROPIONATE AND SALMETEROL 50; 250 UG/1; UG/1
1 POWDER ORAL; RESPIRATORY (INHALATION)
Qty: 0 | Refills: 0 | DISCHARGE

## 2021-01-20 RX ORDER — ASPIRIN/CALCIUM CARB/MAGNESIUM 324 MG
1 TABLET ORAL
Qty: 0 | Refills: 0 | DISCHARGE

## 2021-01-20 RX ORDER — METOPROLOL TARTRATE 50 MG
1 TABLET ORAL
Qty: 0 | Refills: 0 | DISCHARGE

## 2021-01-20 RX ORDER — ACETAMINOPHEN 500 MG
975 TABLET ORAL ONCE
Refills: 0 | Status: COMPLETED | OUTPATIENT
Start: 2021-01-20 | End: 2021-01-20

## 2021-01-20 RX ORDER — NEBIVOLOL HYDROCHLORIDE 5 MG/1
1 TABLET ORAL
Qty: 0 | Refills: 0 | DISCHARGE

## 2021-01-20 RX ORDER — FAMOTIDINE 10 MG/ML
20 INJECTION INTRAVENOUS ONCE
Refills: 0 | Status: COMPLETED | OUTPATIENT
Start: 2021-01-20 | End: 2021-01-20

## 2021-01-20 RX ORDER — TRIAMTERENE/HYDROCHLOROTHIAZID 75 MG-50MG
1 TABLET ORAL
Qty: 0 | Refills: 0 | DISCHARGE

## 2021-01-20 RX ORDER — ROSUVASTATIN CALCIUM 5 MG/1
1 TABLET ORAL
Qty: 0 | Refills: 0 | DISCHARGE

## 2021-01-20 RX ORDER — METOCLOPRAMIDE HCL 10 MG
10 TABLET ORAL ONCE
Refills: 0 | Status: COMPLETED | OUTPATIENT
Start: 2021-01-20 | End: 2021-01-20

## 2021-01-20 RX ORDER — BENRALIZUMAB 30 MG/ML
1 INJECTION, SOLUTION SUBCUTANEOUS
Qty: 0 | Refills: 0 | DISCHARGE

## 2021-01-20 RX ORDER — ONDANSETRON 8 MG/1
4 TABLET, FILM COATED ORAL ONCE
Refills: 0 | Status: COMPLETED | OUTPATIENT
Start: 2021-01-20 | End: 2021-01-20

## 2021-01-20 RX ADMIN — SODIUM CHLORIDE 75 MILLILITER(S): 9 INJECTION, SOLUTION INTRAVENOUS at 11:07

## 2021-01-20 RX ADMIN — FENTANYL CITRATE 25 MICROGRAM(S): 50 INJECTION INTRAVENOUS at 11:13

## 2021-01-20 RX ADMIN — Medication 10 MILLIGRAM(S): at 07:40

## 2021-01-20 RX ADMIN — ONDANSETRON 4 MILLIGRAM(S): 8 TABLET, FILM COATED ORAL at 10:55

## 2021-01-20 RX ADMIN — FENTANYL CITRATE 25 MICROGRAM(S): 50 INJECTION INTRAVENOUS at 11:06

## 2021-01-20 RX ADMIN — FAMOTIDINE 20 MILLIGRAM(S): 10 INJECTION INTRAVENOUS at 07:40

## 2021-01-20 RX ADMIN — Medication 975 MILLIGRAM(S): at 07:40

## 2021-01-20 RX ADMIN — SODIUM CHLORIDE 75 MILLILITER(S): 9 INJECTION, SOLUTION INTRAVENOUS at 10:59

## 2021-01-20 NOTE — BRIEF OPERATIVE NOTE - NSICDXBRIEFPROCEDURE_GEN_ALL_CORE_FT
PROCEDURES:  Coblation turbinate reduction 20-Jan-2021 10:36:03  Bryon Byers  Outfracture, nasal turbinate 20-Jan-2021 10:35:51  Bryon Byers  FESS, with imaging guidance, adult 20-Jan-2021 10:35:40  Bryon Byers

## 2021-01-20 NOTE — ASU PATIENT PROFILE, ADULT - PMH
Asthma    Diverticulosis of intestine without bleeding, unspecified intestinal tract location    Essential hypertension    Fatty liver    Hiatal hernia    History of kidney stones    Lumbar herniated disc    MVP (mitral valve prolapse)    Nasal polyps  history of. Present  Osteoarthritis  left hip  Osteoarthritis of right hip, unspecified osteoarthritis type    Seasonal allergic rhinitis due to pollen    Tinnitus of both ears

## 2021-01-20 NOTE — BRIEF OPERATIVE NOTE - NSICDXBRIEFPOSTOP_GEN_ALL_CORE_FT
POST-OP DIAGNOSIS:  Nasal turbinate hypertrophy 20-Jan-2021 10:36:55  Bryon Byers  Nasal polyp 20-Jan-2021 10:36:48  Bryon Byers

## 2021-01-20 NOTE — ASU DISCHARGE PLAN (ADULT/PEDIATRIC) - CARE PROVIDER_API CALL
Bryon Byers)  Otolaryngology  [address] [place] [address]  Phone: [phone]  Fax: [phone]  Follow Up Time:

## 2021-01-20 NOTE — BRIEF OPERATIVE NOTE - NSICDXBRIEFPREOP_GEN_ALL_CORE_FT
PRE-OP DIAGNOSIS:  Nasal turbinate hypertrophy 20-Jan-2021 10:36:32  Bryon Byers  Nasal polyp 20-Jan-2021 10:36:19  Bryon Byers

## 2021-01-27 DIAGNOSIS — Z79.82 LONG TERM (CURRENT) USE OF ASPIRIN: ICD-10-CM

## 2021-01-27 DIAGNOSIS — Z87.891 PERSONAL HISTORY OF NICOTINE DEPENDENCE: ICD-10-CM

## 2021-01-27 DIAGNOSIS — J34.3 HYPERTROPHY OF NASAL TURBINATES: ICD-10-CM

## 2021-01-27 DIAGNOSIS — I10 ESSENTIAL (PRIMARY) HYPERTENSION: ICD-10-CM

## 2021-01-27 DIAGNOSIS — J32.9 CHRONIC SINUSITIS, UNSPECIFIED: ICD-10-CM

## 2021-01-27 DIAGNOSIS — J33.9 NASAL POLYP, UNSPECIFIED: ICD-10-CM

## 2021-01-27 DIAGNOSIS — M19.90 UNSPECIFIED OSTEOARTHRITIS, UNSPECIFIED SITE: ICD-10-CM

## 2021-01-27 DIAGNOSIS — E78.5 HYPERLIPIDEMIA, UNSPECIFIED: ICD-10-CM

## 2021-01-27 DIAGNOSIS — Z96.643 PRESENCE OF ARTIFICIAL HIP JOINT, BILATERAL: ICD-10-CM

## 2021-01-27 DIAGNOSIS — E03.9 HYPOTHYROIDISM, UNSPECIFIED: ICD-10-CM

## 2021-01-27 DIAGNOSIS — J45.909 UNSPECIFIED ASTHMA, UNCOMPLICATED: ICD-10-CM

## 2021-02-19 NOTE — H&P PST ADULT - GENITOURINARY
Group Topic: BH Therapeutic Activity    Date: 2/19/2021  Start Time: 1430  End Time: 1515  Facilitators: AFSANEH Rock; Debora Magill, MSW    Focus: Mindful Coloring  Number in attendance: 5  Patients engaged in coloring while listening to music. Throughout group, therapists checked in with patients about their feelings and how music and coloring can be soothing.     Method: Group  Attendance: Present  Participation: Active  Patient Response: Hyper-verbal, Interested in topic and Interactive  Mood: Anxious  Affect: Type: Anxious  Behavior/Socialization: Cooperative and Negativistic  Thought Process: Tracking  Task Performance: Follows directions  Patient Evaluation: Independent - full participation   Pt engaged well in the mindfulness activity. He often focused on what was wrong like his hands being too shaky or not being able to color. However, he was able to explore how he could focus on what he could do and change when the therapist prompted him. There were no behavioral concerns during group.  AFSANEH Bautista, JANN      negative

## 2021-04-15 ENCOUNTER — NON-APPOINTMENT (OUTPATIENT)
Age: 66
End: 2021-04-15

## 2021-04-26 PROBLEM — J33.9 NASAL POLYP, UNSPECIFIED: Chronic | Status: ACTIVE | Noted: 2017-07-12

## 2021-04-29 ENCOUNTER — OUTPATIENT (OUTPATIENT)
Dept: OUTPATIENT SERVICES | Facility: HOSPITAL | Age: 66
LOS: 1 days | End: 2021-04-29
Payer: MEDICARE

## 2021-04-29 ENCOUNTER — APPOINTMENT (OUTPATIENT)
Dept: ULTRASOUND IMAGING | Facility: CLINIC | Age: 66
End: 2021-04-29
Payer: MEDICARE

## 2021-04-29 DIAGNOSIS — Z98.890 OTHER SPECIFIED POSTPROCEDURAL STATES: Chronic | ICD-10-CM

## 2021-04-29 DIAGNOSIS — Z96.641 PRESENCE OF RIGHT ARTIFICIAL HIP JOINT: Chronic | ICD-10-CM

## 2021-04-29 DIAGNOSIS — Z96.642 PRESENCE OF LEFT ARTIFICIAL HIP JOINT: Chronic | ICD-10-CM

## 2021-04-29 DIAGNOSIS — Z00.8 ENCOUNTER FOR OTHER GENERAL EXAMINATION: ICD-10-CM

## 2021-04-29 PROCEDURE — 76700 US EXAM ABDOM COMPLETE: CPT | Mod: 26

## 2021-04-29 PROCEDURE — 76700 US EXAM ABDOM COMPLETE: CPT

## 2021-05-07 NOTE — DISCHARGE NOTE ADULT - MODE OF TRANSPORTATION
Problem: Knowledge Deficit  Goal: *Participate in the learning process  Outcome: Progressing Towards Goal limitations in strength Ambulatory

## 2021-06-15 ENCOUNTER — APPOINTMENT (OUTPATIENT)
Dept: CARDIOLOGY | Facility: CLINIC | Age: 66
End: 2021-06-15
Payer: MEDICARE

## 2021-06-15 PROCEDURE — 93306 TTE W/DOPPLER COMPLETE: CPT

## 2021-07-07 ENCOUNTER — APPOINTMENT (OUTPATIENT)
Dept: CARDIOLOGY | Facility: CLINIC | Age: 66
End: 2021-07-07
Payer: MEDICARE

## 2021-07-07 VITALS
BODY MASS INDEX: 24.79 KG/M2 | HEIGHT: 72 IN | WEIGHT: 183 LBS | RESPIRATION RATE: 16 BRPM | HEART RATE: 73 BPM | DIASTOLIC BLOOD PRESSURE: 75 MMHG | SYSTOLIC BLOOD PRESSURE: 115 MMHG | OXYGEN SATURATION: 97 %

## 2021-07-07 PROCEDURE — 93000 ELECTROCARDIOGRAM COMPLETE: CPT

## 2021-07-07 PROCEDURE — 99214 OFFICE O/P EST MOD 30 MIN: CPT

## 2021-07-07 RX ORDER — OXYCODONE AND ACETAMINOPHEN 5; 325 MG/1; MG/1
5-325 TABLET ORAL
Qty: 40 | Refills: 0 | Status: DISCONTINUED | COMMUNITY
Start: 2020-11-17 | End: 2021-07-07

## 2021-07-07 NOTE — PHYSICAL EXAM
[General Appearance - Well Developed] : well developed [Normal Appearance] : normal appearance [General Appearance - Well Nourished] : well nourished [Normal Conjunctiva] : the conjunctiva exhibited no abnormalities [Normal Jugular Venous V Waves Present] : normal jugular venous V waves present [Respiration, Rhythm And Depth] : normal respiratory rhythm and effort [Exaggerated Use Of Accessory Muscles For Inspiration] : no accessory muscle use [Abnormal Walk] : normal gait [Skin Color & Pigmentation] : normal skin color and pigmentation [Skin Turgor] : normal skin turgor [] : no rash [Oriented To Time, Place, And Person] : oriented to person, place, and time [Impaired Insight] : insight and judgment were intact [No Anxiety] : not feeling anxious [FreeTextEntry1] : Trace edema

## 2021-07-07 NOTE — ASSESSMENT
[FreeTextEntry1] : ECG SR at 73 bpm normal axis, intervals, no sign of ST or T wave changes\par \par Echo 6/15/21\par EF 55-60%\par Impaired relaxation of diastolic  filling\par Mitral valve ring and repair is intact with trace mitral valve regurgitation\par Mild tricuspid regurgitation\par Mild pulmonic valve regurgitation\par Mild dilation of the ascending Aorta = 4.00cm\par TR jet inadequate for PA pressures\par \par Laboratory data\par \par           3/20/20:   6/18/20 4/29/21\par Chol    181             179        154\par HDL     116             104       103\par LDL       54               65         41\par LFTs normal                   ALT 54\par Electrolytes normal\par A1c 5.6\par \par Lab data  11/22/2019 ( Dr. Gallego)\par Chol. 173\par LDL 66\par HDL 96\par Tri. 58\par Creat.1.04\par LFTs WNL\par HGB 15.0\par A1C 5.5\par \par \par ECHO 6/30/20\par Inferobasilar Hypokinesis with LVEF 65%\par s/p MV Repair with Mild MR\par Nl PAP\par \par Echocardiogram:  3/11/19\par Overall normal left atrial size and function with basal inferior wall hypokinesis.\par Mitral valve repair with no significant mitral regurgitation.\par RV size and function are normal\par No evidence of pulmonary hypertension\par Comparison with the prior study, the mitral valve repair is intact and is new and the basal inferior wall hypokinesis may be new.\par \par Impression/ PLAN\par \par 1.  Blood pressure continues to be controlled  with meds, diet and exercise, wt loss. ECG SR.\par 2.  Echo with NL EF and unchanged ascending aorta dilation.\par     Basal inferior wall hypokinesis of uncertain significance. Patient did not have coronary disease.\par     Mitral valve repair looks excellent.\par 3. Status of lipids is fine\par 4. Edema possibly related to amlodipine vs old injury. \par 5. No coronary symptoms.\par 6. RISHI probably an error or a results of Meloxicam use.Seems to have normalized\par 7. Elevated LFTS with hx of fatty liver being watched.\par 8. Diet is pretty well controlled and no significant sodium loading.\par \par Plan;\par 1.Continue  Amlodipine at  2.5 mg day \par \par 2. He will continue to monitor his blood pressure regularly                                                                                                                                                            \par 3.Instructed the patient about the benefits of a diet that restricts portion sizes, increases frequency of meals and consists of  vegetables, (more green and leafy),fruit and nuts, whole grains, lean proteins and limits carbohydrates and meat and dairy fats\par \par 4. Repeat BW in 6 months\par \par 5. Continue weight loss. \par \par \par Clinical follow up in 6 months

## 2021-07-07 NOTE — REASON FOR VISIT
[FreeTextEntry1] : This is a 66 -year-old male presenting for cardiac reevaluation.\par \par status-post a robotic mitral valve replacement 11/27/18 via right-sided incision and ports.  \par He had presented with severe mitral valve prolapse, normal left ventricular systolic function, pulmonary hypertension, mild palpitations, and dyspnea on exertion.  \par \par Hypertension being managed with amlodipine, metoprolol and Dyazide.  Avg. for June 125/88\par Pain issues being managed with Oxycodone. Continues to watch diet. \par \par Due to pedal and ankle edema we decreased the Amlodipine to 2.5 mg  with good response. Right ankle does have a deformity due to old injury which tends to have baseline  trace- mild edema.\par \par Serial BMPs obtained to monitor renal fx.\par 3/10/21 0.95\par 4/29/21 1.64\par 5/21/21  1.17\par \par ABD. US 4/29/21 with diffuse hepatic stenosis\par Fully vaccinated\par \par  \par \par

## 2021-07-07 NOTE — HISTORY OF PRESENT ILLNESS
[FreeTextEntry1] : His history includes:\par 1.	Hypertension.\par 2.	Hyperlipidemia.\par 3.	Valvular heart disease.\par 4.	Moderately severe mitral regurgitation surgically corrected\par \par BW and echo will be discussed.\par \par

## 2022-02-02 ENCOUNTER — APPOINTMENT (OUTPATIENT)
Dept: CARDIOLOGY | Facility: CLINIC | Age: 67
End: 2022-02-02
Payer: MEDICARE

## 2022-02-02 VITALS
HEART RATE: 71 BPM | BODY MASS INDEX: 25.73 KG/M2 | OXYGEN SATURATION: 97 % | DIASTOLIC BLOOD PRESSURE: 75 MMHG | WEIGHT: 190 LBS | SYSTOLIC BLOOD PRESSURE: 110 MMHG | RESPIRATION RATE: 16 BRPM | HEIGHT: 72 IN

## 2022-02-02 PROCEDURE — 99214 OFFICE O/P EST MOD 30 MIN: CPT

## 2022-02-02 PROCEDURE — 93000 ELECTROCARDIOGRAM COMPLETE: CPT

## 2022-02-02 RX ORDER — ALLOPURINOL 300 MG/1
300 TABLET ORAL DAILY
Qty: 90 | Refills: 3 | Status: ACTIVE | COMMUNITY

## 2022-02-02 NOTE — REASON FOR VISIT
[FreeTextEntry1] : This is a 66 -year-old male presenting for cardiac reevaluation.\par \par status-post a robotic mitral valve replacement 11/27/18 via right-sided incision and ports.  \par He had presented with severe mitral valve prolapse, normal left ventricular systolic function, pulmonary hypertension, mild palpitations, and dyspnea on exertion.  \par \par Hypertension being managed with amlodipine, metoprolol and Dyazide.  Home blood pressure remains well controlled.\par \par \par Due to pedal and ankle edema we decreased the Amlodipine to 2.5 mg  with good response. Right ankle does have a deformity due to old injury which tends to have baseline  trace- mild edema.\par \par Serial BMPs obtained to monitor renal fx.\par 3/10/21 0.95\par 4/29/21 1.64\par 5/21/21  1.17\par \par ABD. US 4/29/21 with diffuse hepatic stenosis\par Fully vaccinated\par \par  \par \par

## 2022-02-02 NOTE — ASSESSMENT
[FreeTextEntry1] : ECG SR at 71  bpm normal axis, intervals, no sign of ST or T wave changes\par \par Echo 6/15/21\par EF 55-60%\par Impaired relaxation of diastolic  filling\par Mitral valve ring and repair is intact with trace mitral valve regurgitation\par Mild tricuspid regurgitation\par Mild pulmonic valve regurgitation\par Mild dilation of the ascending Aorta = 4.00cm\par TR jet inadequate for PA pressures\par \par Laboratory data\par \par ------3/20/20---6/18/20---4/29/21----9/24/21---1/27/22\par Chol---181------179-------154---------205--------131\par HDL---116-------104-------103---------95----------82\par LDL-----54-------65---------41---------99----------40\par ALT------54------------------------------55---------61\par Electrolytes normal\par A1c 5.6\par \par Lab data  11/22/2019 ( Dr. Gallego)\par Chol. 173\par LDL 66\par HDL 96\par Tri. 58\par Creat.1.04\par LFTs WNL\par HGB 15.0\par A1C 5.5\par \par \par ECHO 6/30/20\par Inferobasilar Hypokinesis with LVEF 65%\par s/p MV Repair with Mild MR\par Nl PAP\par \par Echocardiogram:  3/11/19\par Overall normal left atrial size and function with basal inferior wall hypokinesis.\par Mitral valve repair with no significant mitral regurgitation.\par RV size and function are normal\par No evidence of pulmonary hypertension\par Comparison with the prior study, the mitral valve repair is intact and is new and the basal inferior wall hypokinesis may be new.\par \par Impression/ PLAN\par \par 1.  Blood pressure continues to be controlled  with meds, diet and exercise, wt loss. ECG SR.\par 2.  Echo with NL EF and unchanged ascending aorta dilation.\par     Basal inferior wall hypokinesis of uncertain significance. Patient did not have coronary disease.\par     Mitral valve repair looks excellent.\par 3. Status of lipids is fine.  Suspect this is September elevation might have been an error.\par 4. Edema has resolved.\par 5. No coronary symptoms.\par 6. RISHI probably an error or a results of Meloxicam use.Seems to have normalized\par 7. Elevated LFTS with hx of fatty liver being watched.  Relatively stable.\par 8. Diet is pretty well controlled and no significant sodium loading.\par \par Plan;\par 1.Continue  Amlodipine at  2.5 mg day \par \par 2. He will continue to monitor his blood pressure regularly                                                                                                                                                            \par 3.Instructed the patient about the benefits of a diet that restricts portion sizes, increases frequency of meals and consists of  vegetables, (more green and leafy),fruit and nuts, whole grains, lean proteins and limits carbohydrates and meat and dairy fats\par \par 4. Repeat BW in 6 months\par \par 5. Continue weight loss. \par \par 6.  Echocardiogram in 6 months.\par Clinical follow up in 6 months

## 2022-05-31 ENCOUNTER — APPOINTMENT (OUTPATIENT)
Dept: NEUROLOGY | Facility: CLINIC | Age: 67
End: 2022-05-31

## 2022-07-08 NOTE — ASU PATIENT PROFILE, ADULT - PMH
Progress Note      7/8/2022 9:12 AM  NAME: Javier Bermudez   MRN:  [id number]   Admit Diagnosis: Thrombocytopenia ([facility] [D69.6]  GI bleed [K92.2]  UGO (acute kidney injury) ([facility] [address] [N17.9]      Problem List:     1. Idiopathic thrombocytopenia  2. Anemia  3. Rectal bleeding  4. Lactic acidosis  5. NSTEMI  6. Paroxysmal atrial fibrillation  7. Remote PE and DVT (separate events)  8. CAD w/ mild diffuse disease w/ patent stent in the RCA 7/17 cath. 9. XOL  10. Morbid obesity  11. DM  12. Former smoker     Assessment/Plan: TnI 2300  PLT recovered  HgB 9s    MPI w/ EF 55% and no definite ischemia. 1. Continue oral amio  2. Off ASA  3. Eliquis on hold  4. Continue statin  5. In the OR this AM with Dr. Rashid Hernandez. 6. Dr. Hernández Later will be available as needed over the wknd. [x]       High complexity decision making was performed in this patient at high risk for decompensation with multiple organ involvement. Subjective:     Javier Bermudez denies chest pain, dyspnea. Discussed with RN events overnight. Review of Systems:    Symptom Y/N Comments  Symptom Y/N Comments   Fever/Chills N   Chest Pain N    Poor Appetite N   Edema N    Cough N   Abdominal Pain N    Sputum N   Joint Pain N    SOB/SHEARER N   Pruritis/Rash N    Nausea/vomit N   Tolerating PT/OT Y    Diarrhea N   Tolerating Diet Y    Constipation N   Other       Could NOT obtain due to:      Objective:      Physical Exam:    Last 24hrs VS reviewed since prior progress note.  Most recent are:    Visit Vitals  /67 (BP 1 Location: Right arm, BP Patient Position: At rest)   Pulse 74   Temp 98.2 °F (36.8 °C)   Resp 18   Ht 6' 4\" (1.93 m)   Wt 125.7 kg (277 lb 1.9 oz)   SpO2 97%   BMI 33.73 kg/m²       Intake/Output Summary (Last 24 hours) at 7/8/2022 1007  Last data filed at 7/8/2022 0945  Gross per 24 hour   Intake 300 ml   Output 2125 ml   Net -1825 ml        General Appearance: Well developed, well nourished, alert & oriented x 3, no acute distress. Ears/Nose/Mouth/Throat: Hearing grossly normal.  Neck: Supple. Chest: Lungs clear to auscultation bilaterally. Cardiovascular: Regular rate and rhythm, S1S2 normal, no murmur. Abdomen: Soft, non-tender, bowel sounds are active. Extremities: No edema bilaterally. Skin: Warm and dry. Petechiae. []         Post-cath site without hematoma, bruit, tenderness, or thrill. Distal pulses intact. PMH/ reviewed - no change compared to H&P    Data Review    Telemetry: sinus rhythm     EKG:   [x]  No new EKG for review    Lab Data Personally Reviewed:    Recent Labs     07/08/22 0129 07/07/22  0332   WBC 10.0 7.4   HGB 9.8* 8.0*   HCT 29.3* 24.3*    172     No results for input(s): INR, PTP, APTT, INREXT, INREXT in the last 72 hours. Recent Labs     07/08/22 0129 07/07/22  0332 07/06/22  0128   * 136 135*   K 3.7 3.0* 3.5    106 103   CO2 21 23 24   BUN 22* 26* 32*   CREA 1.19 0.95 1.12   * 92 129*   CA 8.0* 7.8* 7.8*   MG 2.4 2.4 2.4     No results for input(s): CPK, CKNDX, TROIQ in the last 72 hours. No lab exists for component: CPKMB  Lab Results   Component Value Date/Time    Cholesterol, total 133 06/07/2013 02:40 PM    HDL Cholesterol 43 06/07/2013 02:40 PM    LDL, calculated 73.4 06/07/2013 02:40 PM    Triglyceride 83 06/07/2013 02:40 PM    CHOL/HDL Ratio 3.1 06/07/2013 02:40 PM       No results for input(s): AP, TBIL, TP, ALB, GLOB, GGT, AML, LPSE in the last 72 hours. No lab exists for component: SGOT, GPT, AMYP, HLPSE  No results for input(s): PH, PCO2, PO2 in the last 72 hours.     Medications Personally Reviewed:    Current Facility-Administered Medications   Medication Dose Route Frequency    sodium chloride (NS) flush 5-40 mL  5-40 mL IntraVENous Q8H    sodium chloride (NS) flush 5-40 mL  5-40 mL IntraVENous PRN    lidocaine (PF) (XYLOCAINE) 10 mg/mL (1 %) injection 0.1 mL  0.1 mL SubCUTAneous PRN    lactated Ringers infusion  25 mL/hr IntraVENous CONTINUOUS    [Held by provider] apixaban (ELIQUIS) tablet 5 mg  5 mg Oral BID    amiodarone (CORDARONE) tablet 200 mg  200 mg Oral DAILY    phenol throat spray (CHLORASEPTIC) 1 Spray  1 Spray Oral PRN    polyethylene glycol (MIRALAX) packet 17 g  17 g Oral DAILY    lactulose (CHRONULAC) 10 gram/15 mL solution 300 mL  200 g Rectal BID    insulin lispro (HUMALOG) injection   SubCUTAneous Q6H    pantoprazole (PROTONIX) 40 mg in 0.9% sodium chloride 10 mL injection  40 mg IntraVENous DAILY    calcium carbonate (TUMS) chewable tablet 400 mg [elemental]  400 mg Oral TID PRN    glucose chewable tablet 16 g  4 Tablet Oral PRN    glucagon (GLUCAGEN) injection 1 mg  1 mg IntraMUSCular PRN    dextrose 10% infusion 0-250 mL  0-250 mL IntraVENous PRN    nitroglycerin (NITROSTAT) tablet 0.4 mg  0.4 mg SubLINGual PRN    atorvastatin (LIPITOR) tablet 20 mg  20 mg Oral QHS    vitamin D-B-V-lutein-minerals (OCUVITE) tablet 1 Tablet  1 Tablet Oral DAILY    sodium chloride (NS) flush 5-40 mL  5-40 mL IntraVENous Q8H    sodium chloride (NS) flush 5-40 mL  5-40 mL IntraVENous PRN    acetaminophen (TYLENOL) tablet 650 mg  650 mg Oral Q6H PRN    Or    acetaminophen (TYLENOL) suppository 650 mg  650 mg Rectal Q6H PRN    polyethylene glycol (MIRALAX) packet 17 g  17 g Oral DAILY PRN    ondansetron (ZOFRAN ODT) tablet 4 mg  4 mg Oral Q8H PRN    Or    ondansetron (ZOFRAN) injection 4 mg  4 mg IntraVENous Q6H PRN     Facility-Administered Medications Ordered in Other Encounters   Medication Dose Route Frequency    propofoL (DIPRIVAN) 10 mg/mL injection   IntraVENous PRN    lidocaine (PF) (XYLOCAINE) 20 mg/mL (2 %) injection   IntraVENous PRN    PHENYLephrine (NEOSYNEPHRINE) in NS syringe   IntraVENous PRN    succinylcholine (ANECTINE) injection   IntraVENous PRN    fentaNYL citrate (PF) injection   IntraVENous PRN    rocuronium injection   IntraVENous PRN    dexamethasone (DECADRON) 4 mg/mL injection IntraVENous PRN    albumin human 5% (BUMINATE) solution   IntraVENous PRN    PHENYLephrine (MISTY-SYNEPHRINE) 20 mg in 0.9% sodium chloride 250 mL infusion   IntraVENous CONTINUOUS    sodium chloride (NS) flush 10 mL  10 mL IntraVENous PRN         Bonny Shepard III, DO Asthma    Diverticulosis of intestine without bleeding, unspecified intestinal tract location    Essential hypertension    Fatty liver    Hiatal hernia    History of kidney stones    Lumbar herniated disc    MVP (mitral valve prolapse)    Nasal polyps  history of. Present  Osteoarthritis  left hip  Osteoarthritis of right hip, unspecified osteoarthritis type    Seasonal allergic rhinitis due to pollen    Tinnitus of both ears

## 2022-07-13 ENCOUNTER — APPOINTMENT (OUTPATIENT)
Dept: CARDIOLOGY | Facility: CLINIC | Age: 67
End: 2022-07-13

## 2022-07-13 PROCEDURE — 93306 TTE W/DOPPLER COMPLETE: CPT

## 2022-07-13 RX ADMIN — PERFLUTREN MG/ML: 6.52 INJECTION, SUSPENSION INTRAVENOUS at 00:00

## 2022-07-20 RX ORDER — PERFLUTREN 6.52 MG/ML
6.52 INJECTION, SUSPENSION INTRAVENOUS
Qty: 2 | Refills: 0 | Status: COMPLETED | OUTPATIENT
Start: 2022-07-13

## 2022-08-02 NOTE — ED ADULT NURSE NOTE - BREATHING, MLM
CONSULT TYPE:  Vascular Surgery.    DATE OF CONSULT:  8/2/2022    REQUESTING PROVIDER:  Zora Galicia MD    CHIEF COMPLAINT:  Establishing care for venous disease    HISTORY OF PRESENT ILLNESS:  Janki Fritz is a 86 year old female who comes to establish care for asymptomatic varicose veins. Patient has known history chronic venous insufficiency with healed ulcer which was active in the 1970's. States her venous disease began with pregnancies in the 1950's. No recent discoloration, bleeding/itching varicose veins, DVT, SVT, PE, or active venous ulcers. No fatigue, pain, or swelling. She does wear 30-40 mm Hg compression stockings, elevates, and does perform mild exercise by walking her dogs. Nonsmoker. Not on anticoagulation but was on Warfarin and Eliquis shortly after surgery in 2016, although reason unclear from interview today. Patient states warfarin was for superficial thrombophlebitis.     Patient is concerns about intermittent \"tingling\" sensation she has in both legs that is unrelated to activity or compression stocking therapy. Denies any coolness, motor issues.     PAST MEDICAL HISTORY:  (Reviewed)     HTN (hypertension)                                            Left ventricular diastolic dysfunction                        Mitral insufficiency                                          Pulmonary hypertension (CMS/HCC)                              Osteoporosis, unspecified                       10/01/2008    Phlebitis and thrombophlebitis                                Phlebitis                                                     Cataract                                                        Comment: had surgery for both    Diarrhea                                        05/2017         Comment: coming for colonoscopy    Hiatal hernia                                                 Scoliosis                                                       Comment: curviture of the spine    Colon polyp                                      05/16/2017      Comment: Tubular Adenoma, recall 2022 - dr juárez     Malignant neoplasm (CMS/HCC)                                  Wrist fracture, right                           1976          Legally blind                                                   Comment: Uses \"head gear and reading glasses to see\"     PAST SURGICAL HISTORY:     DEXA BONE DENSITY AXIAL SKELETON                04/26/2008    WRIST FRACTURE SURGERY                          1976            Comment: right    JOINT REPLACEMENT                               11/6/1997       Comment: left knee    ABDOMEN SURGERY                                 2016            Comment: para-esophageal hiatel hernia with mesh    WISDOM TOOTH EXTRACTION                                       COLONOSCOPY                                     05/16/2017      Comment: dr juárez    COLONOSCOPY                                     05/21/2006      Comment: colonoscopy    TONSILLECTOMY AND ADENOIDECTOMY                 1940          EYE SURGERY                                     2008            Comment: bilateral cataract     MOUTH SURGERY                                   11/17/2017      Comment: Right infrastructure maxillectomy by Dr. Irene at [facility]    REMOVAL GALLBLADDER                             04/18/2016      Comment: w/ para esophageal herniorrhaphy and right                hemicolectomy by Dr. Plascencia at [facility]    HERNIA REPAIR                                   04/18/2016      Comment: para esophageal herniorrhaphy w/ gallbladder                and right hemicolectomy by Dr. Plascencia at [facility]    ------------OTHER-------------                  11/17/2017      Comment: MAXILLECTOMY    CURRENT MEDICATIONS:   Current Outpatient Medications   Medication Sig Dispense Refill   • furosemide (LASIX) 20 MG tablet Take 1 tablet by mouth daily. 90 tablet 0   • Wheat Dextrin (Benefiber) Powder Take 4 g by mouth 3 times daily, add to 4 to 8  ounces of liquid or soft food 3 times daily 245 g 0   • NIFEdipine CC (ADALAT CC) 30 MG 24 hr tablet Take 1 tablet by mouth daily. 90 tablet 1   • meclizine (ANTIVERT) 25 MG tablet Take 1 tablet by mouth 3 times daily as needed for Dizziness. 60 tablet 0   • Probiotic Product (PROBIOTIC DAILY PO) Take by mouth daily.      • acetaminophen 650 MG Tab Take 650 mg by mouth every 6 hours as needed for Pain or Fever. (Patient taking differently: Take 650 mg by mouth every 6 hours as needed for Pain or Fever. Takes 2 tablets AM and 2 tablets PM) 30 tablet 0   • Ferrous Sulfate (IRON) 325 (65 FE) MG TABS Take 1 tablet by mouth daily.     • vitamin - therapeutic multivitamins w/minerals (CENTRUM SILVER,THERA-M) TABS Take 1 tablet by mouth daily.       No current facility-administered medications for this visit.        ALLERGIES:  ALLERGIES:   Allergen Reactions   • Fosamax PRURITUS     spots   • Penicillins Other (See Comments)     Spots Tolerated cefoxitin x 6 doses admission 2016   • Vicodin [Hydrocodone-Acetaminophen] PRURITUS     spots        SOCIAL HISTORY:  Social History     Socioeconomic History   • Marital status:      Spouse name: Not on file   • Number of children: Not on file   • Years of education: Not on file   • Highest education level: Not on file   Occupational History   • Not on file   Tobacco Use   • Smoking status: Former Smoker     Years: 2.00     Types: Cigarettes     Quit date: 1955     Years since quittin.6   • Smokeless tobacco: Never Used   • Tobacco comment: pt. states she didn't really smoke    Substance and Sexual Activity   • Alcohol use: No     Alcohol/week: 0.0 standard drinks   • Drug use: No   • Sexual activity: Never   Other Topics Concern   • Not on file   Social History Narrative   • Not on file     Social Determinants of Health     Financial Resource Strain: Not on file   Food Insecurity: Not on file   Transportation Needs: Not on file   Physical Activity: Not on file    Stress: Not on file   Social Connections: Not on file   Intimate Partner Violence: Not At Risk   • Social Determinants: Intimate Partner Violence Past Fear: No   • Social Determinants: Intimate Partner Violence Current Fear: No       FAMILY HISTORY:  Family History   Problem Relation Age of Onset   • Heart Mother          of MI at age 40's   • Heart Maternal Grandfather    • Heart Sister         CHF   • Heart Brother         CHF   • Heart disease Son 40        MI       REVIEW OF SYSTEMS:  Complete review of systems completed.  Pertinent symptoms and complaints noted in the HPI (history of present illness) and ROS (review of systems) above.  No other significant complaints noted.    PHYSICAL EXAM:  Vitals:    22 0910   BP: 124/72   Pulse: 65       CONSTITUTIONAL:  The patient is well developed and well nourished, in no apparent distress.   EYES:  Conjunctivae pink.  No ptosis.  Pupils are equal, round.  ENMT:  Head is normocephalic.  Nares and ears appeared normal.  Mouth is well hydrated and without lesions.  Nasal mucosal membranes are moist, no nasal congestion.   NECK:  No masses.  Trachea midline.  No thyromegaly.  RESPIRATORY:  Clear to auscultation bilaterally with normal respiratory effort.  CARDIOVASCULAR:  Heart sounds - regular rate and rhythm without murmur.  Vascular: Palpable radial, brachial, carotid, femoral, popliteal, and dorsalis pedis pulses without appreciable aneurysms.   GASTROINTESTINAL:  Soft, nontender, and nondistended. No palpable pulsatile masses.   Extremities  RLE: warm and dry.  No other gangrene, ulceration, or signs of microembolization. No significant edema, or varicosities. Sensation intact throughout distributions  LLE:warm and dry.  No other gangrene, ulceration, or signs of microembolization.No significant edema, or varicosities. Sensation intact throughout distributions  PSYCHIATRIC:  Alert and oriented in time, place and person with appropriate  affect.      Laboratory Results:   Lab Results   Component Value Date    WBC 4.4 2021    HCT 37.0 2021    HGB 12.4 2021     2021    INR 1.0 2020    PTT 24 2020    BUN 20 2021    CREATININE 0.93 2021    SODIUM 136 2021    POTASSIUM 4.4 2021    CHLORIDE 102 2021    AST 19 2021    ALKPT 76 2021    BILIRUBIN 0.5 2021    CO2 30 2021    GPT 22 2021    GLUCOSE 104 (H) 2021       IMAGING:  No results found.    DIAGNOSIS:  There are no diagnoses linked to this encounter.    IMPRESSION/PLAN:  85yo with chronic venous insufficiency, CEAP 3. She is asymptomatic at this time. She already complies with recommendations includin-30 mm Hg knee high compression and elevation to heart level 2-3 hours per day as well as walking exercise. She actually wears 30-40 mm Hg compression stockings. Recommend further discussion with her primary care physician regarding neuropathic symptoms to legs, as there is low likelihood that this is related to venous or arterial insufficiency at this time. Will see her again in one year for venous follow-up. All questions answered. She voiced understanding and agreement with the plan.     I really appreciate the opportunity of participating in the care of this patient.  Time spent on today's appointment: 47 minutes    Michelle Wheat MD  Vascular Surgeon  [phone]- Office             Tachypnea

## 2022-08-04 ENCOUNTER — APPOINTMENT (OUTPATIENT)
Dept: CARDIOLOGY | Facility: CLINIC | Age: 67
End: 2022-08-04

## 2022-08-04 VITALS
HEIGHT: 72 IN | WEIGHT: 201 LBS | OXYGEN SATURATION: 98 % | DIASTOLIC BLOOD PRESSURE: 82 MMHG | RESPIRATION RATE: 16 BRPM | SYSTOLIC BLOOD PRESSURE: 120 MMHG | HEART RATE: 81 BPM | BODY MASS INDEX: 27.22 KG/M2

## 2022-08-04 PROCEDURE — 93000 ELECTROCARDIOGRAM COMPLETE: CPT

## 2022-08-04 PROCEDURE — 99214 OFFICE O/P EST MOD 30 MIN: CPT

## 2022-08-04 RX ORDER — AZELASTINE HYDROCHLORIDE AND FLUTICASONE PROPIONATE 137; 50 UG/1; UG/1
137-50 SPRAY, METERED NASAL DAILY
Qty: 1 | Refills: 3 | Status: DISCONTINUED | COMMUNITY
Start: 2017-10-02 | End: 2022-08-04

## 2022-08-04 RX ORDER — MELOXICAM 15 MG/1
15 TABLET ORAL
Qty: 90 | Refills: 0 | Status: ACTIVE | COMMUNITY
Start: 2021-11-09

## 2022-08-04 NOTE — REASON FOR VISIT
[FreeTextEntry1] : This is a 67  -year-old male presenting for cardiac reevaluation.\par \par 11/27/18 - robotic mitral valve replacement  via right-sided incision and ports.  \par He had presented with severe mitral valve prolapse, normal left ventricular systolic function, pulmonary hypertension, mild palpitations, and dyspnea on exertion.  \par \par Hypertension being managed with amlodipine, metoprolol and Dyazide.  Home blood pressure remains well controlled.  Home blood pressure average 125- 129/84 mm Hg\par \par Due to pedal and ankle edema we decreased the Amlodipine to 2.5 mg  with good response. Right ankle does have a deformity due to old injury which tends to have baseline  trace- mild edema.\par \par Serial BMPs obtained to monitor renal fx.\par 3/10/21 0.95\par 4/29/21 1.64\par 5/21/21  1.17\par \par ABD. US 4/29/21 with diffuse hepatic stenosis\par Fully vaccinated\par \par  \par \par

## 2022-08-04 NOTE — ASSESSMENT
[FreeTextEntry1] : ECG SR at 81  bpm normal axis, intervals, no sign of ST or T wave changes\par \par Echocardiogram 7/13/2022:\par Basal inferior wall hypokinesis with ejection fraction 55 to 60%\par Status post mitral valve ring with mild MR mild TR\par Mildly dilated aortic root\par Unchanged compared to prior study\par \par Echo 6/15/21\par EF 55-60%\par Impaired relaxation of diastolic  filling\par Mitral valve ring and repair is intact with trace mitral valve regurgitation\par Mild tricuspid regurgitation\par Mild pulmonic valve regurgitation\par Mild dilation of the ascending Aorta = 4.00cm\par TR jet inadequate for PA pressures\par \par Laboratory data\par \par ------3/20/20---6/18/20---4/29/21----9/24/21---1/27/22--7/8/22\par Chol---181------179-------154---------205--------131------149\par HDL---116-------104-------103---------95----------82-------84\par LDL-----54-------65---------41---------99----------40-------53\par ALT------54------------------------------55-----------61------58\par Electrolytes normal\par A1c 5.6\par \par Lab data  11/22/2019 ( Dr. Gallego)\par Chol. 173\par LDL 66\par HDL 96\par Tri. 58\par Creat.1.04\par LFTs WNL\par HGB 15.0\par A1C 5.5\par \par \par ECHO 6/30/20\par Inferobasilar Hypokinesis with LVEF 65%\par s/p MV Repair with Mild MR\par Nl PAP\par \par Echocardiogram:  3/11/19\par Overall normal left atrial size and function with basal inferior wall hypokinesis.\par Mitral valve repair with no significant mitral regurgitation.\par RV size and function are normal\par No evidence of pulmonary hypertension\par Comparison with the prior study, the mitral valve repair is intact and is new and the basal inferior wall hypokinesis may be new.\par \par Impression/ PLAN\par \par 1.  Blood pressure continues to be controlled  with meds, diet and exercise\par       There has been progressive weight gain noted.\par \par 2.  Echo with NL EF and unchanged ascending aorta dilation.\par      Basal inferior wall hypokinesis of uncertain significance. Patient did not have coronary disease.\par      Mitral valve repair looks excellent.\par \par 3.HLD -well-controlled\par \par 4. Edema has resolved.\par \par 5. No coronary symptoms.\par \par 6. Elevated LFTS with hx of fatty liver being watched.  Relatively stable.\par \par Plan;\par 1.Continue  Amlodipine at  2.5 mg day \par \par 2. He will continue to monitor his blood pressure regularly                                                                                                                                                            \par 3.Instructed the patient about the benefits of a diet that restricts portion sizes, increases frequency of meals and consists of  vegetables, (more green and leafy),fruit and nuts, whole grains, lean proteins and limits carbohydrates and meat and dairy fats\par \par 4. Repeat BW in 6 months\par \par 5. Continue weight loss. \par \par 6.  Echocardiogram in 1 year\par Clinical follow up in 6 months

## 2022-09-20 NOTE — ASU PATIENT PROFILE, ADULT - PATIENT'S HEIGHT AND WEIGHT RECORDED IN THE VITAL SIGNS FLOWSHEET
Have Your Skin Lesions Been Treated?: not been treated Is This A New Presentation, Or A Follow-Up?: Skin Lesions How Severe Is Your Skin Lesion?: moderate HT 71 inches. Wt. 90.7 kg/yes

## 2023-01-17 ENCOUNTER — APPOINTMENT (OUTPATIENT)
Dept: ULTRASOUND IMAGING | Facility: CLINIC | Age: 68
End: 2023-01-17
Payer: MEDICARE

## 2023-01-17 ENCOUNTER — OUTPATIENT (OUTPATIENT)
Dept: OUTPATIENT SERVICES | Facility: HOSPITAL | Age: 68
LOS: 1 days | End: 2023-01-17
Payer: MEDICARE

## 2023-01-17 ENCOUNTER — APPOINTMENT (OUTPATIENT)
Dept: RADIOLOGY | Facility: CLINIC | Age: 68
End: 2023-01-17
Payer: MEDICARE

## 2023-01-17 DIAGNOSIS — Z96.642 PRESENCE OF LEFT ARTIFICIAL HIP JOINT: Chronic | ICD-10-CM

## 2023-01-17 DIAGNOSIS — Z98.890 OTHER SPECIFIED POSTPROCEDURAL STATES: Chronic | ICD-10-CM

## 2023-01-17 DIAGNOSIS — Z00.8 ENCOUNTER FOR OTHER GENERAL EXAMINATION: ICD-10-CM

## 2023-01-17 DIAGNOSIS — Z96.641 PRESENCE OF RIGHT ARTIFICIAL HIP JOINT: Chronic | ICD-10-CM

## 2023-01-17 PROCEDURE — 76700 US EXAM ABDOM COMPLETE: CPT | Mod: 26

## 2023-01-17 PROCEDURE — 71100 X-RAY EXAM RIBS UNI 2 VIEWS: CPT

## 2023-01-17 PROCEDURE — 76700 US EXAM ABDOM COMPLETE: CPT

## 2023-01-17 PROCEDURE — 71100 X-RAY EXAM RIBS UNI 2 VIEWS: CPT | Mod: 26,RT

## 2023-06-06 NOTE — ED PROVIDER NOTE - NSPTACCESSSVCSAPPTDETAILS_ED_ALL_ED_FT
urgent follow up with Dr. Coleman today Alar Island Pedicle Flap Text: The defect edges were debeveled with a #15 scalpel blade.  Given the location of the defect, shape of the defect and the proximity to the alar rim an island pedicle advancement flap was deemed most appropriate.  Using a sterile surgical marker, an appropriate advancement flap was drawn incorporating the defect, outlining the appropriate donor tissue and placing the expected incisions within the nasal ala running parallel to the alar rim. The area thus outlined was incised with a #15 scalpel blade.  The skin margins were undermined minimally to an appropriate distance in all directions around the primary defect and laterally outward around the island pedicle utilizing iris scissors.  There was minimal undermining beneath the pedicle flap.

## 2023-06-19 ENCOUNTER — APPOINTMENT (OUTPATIENT)
Dept: CARDIOLOGY | Facility: CLINIC | Age: 68
End: 2023-06-19
Payer: MEDICARE

## 2023-06-19 PROCEDURE — 93306 TTE W/DOPPLER COMPLETE: CPT

## 2023-07-03 ENCOUNTER — APPOINTMENT (OUTPATIENT)
Dept: ULTRASOUND IMAGING | Facility: CLINIC | Age: 68
End: 2023-07-03
Payer: MEDICARE

## 2023-07-03 PROCEDURE — 76982 USE 1ST TARGET LESION: CPT | Mod: XS

## 2023-07-03 PROCEDURE — 76700 US EXAM ABDOM COMPLETE: CPT

## 2023-07-12 NOTE — DISCHARGE NOTE ADULT - PRINCIPAL DIAGNOSIS
· Osteoarthritis of right hip, unspecified osteoarthritis type

## 2023-07-20 ENCOUNTER — APPOINTMENT (OUTPATIENT)
Dept: CARDIOLOGY | Facility: CLINIC | Age: 68
End: 2023-07-20
Payer: MEDICARE

## 2023-07-20 DIAGNOSIS — I38 ENDOCARDITIS, VALVE UNSPECIFIED: ICD-10-CM

## 2023-07-20 DIAGNOSIS — F41.9 ANXIETY DISORDER, UNSPECIFIED: ICD-10-CM

## 2023-07-20 PROCEDURE — 93000 ELECTROCARDIOGRAM COMPLETE: CPT

## 2023-07-20 PROCEDURE — 99214 OFFICE O/P EST MOD 30 MIN: CPT

## 2023-07-20 RX ORDER — ROSUVASTATIN CALCIUM 10 MG/1
10 TABLET, FILM COATED ORAL
Qty: 90 | Refills: 3 | Status: ACTIVE | COMMUNITY
Start: 2018-02-22 | End: 1900-01-01

## 2023-07-20 RX ORDER — AMOXICILLIN 875 MG/1
875 TABLET, FILM COATED ORAL
Qty: 20 | Refills: 0 | Status: DISCONTINUED | COMMUNITY
Start: 2022-07-14 | End: 2023-07-20

## 2023-07-20 RX ORDER — GARLIC 200 MG
TABLET ORAL
Refills: 0 | Status: ACTIVE | COMMUNITY

## 2023-07-20 RX ORDER — HYDROXYZINE HYDROCHLORIDE 25 MG/1
25 TABLET ORAL 3 TIMES DAILY
Refills: 0 | Status: DISCONTINUED | COMMUNITY
End: 2023-07-20

## 2023-07-20 RX ORDER — AMLODIPINE BESYLATE 2.5 MG/1
2.5 TABLET ORAL DAILY
Qty: 90 | Refills: 2 | Status: ACTIVE | COMMUNITY
Start: 2019-09-05 | End: 1900-01-01

## 2023-07-20 RX ORDER — DIAZEPAM 5 MG/1
5 TABLET ORAL
Qty: 60 | Refills: 0 | Status: DISCONTINUED | COMMUNITY
Start: 2022-07-14 | End: 2023-07-20

## 2023-07-20 RX ORDER — BENRALIZUMAB 30 MG/ML
30 INJECTION, SOLUTION SUBCUTANEOUS
Refills: 0 | Status: DISCONTINUED | COMMUNITY
End: 2023-07-20

## 2023-07-20 NOTE — ASSESSMENT
[FreeTextEntry1] : ECG SR at 82  bpm normal axis, intervals, no sign of ST or T wave changes, nondiagnostic Q's 3 and F\par \par Echocardiogram 6/19/2023:\par Normal LV size and function LVEF 60 to 65% with a small area of basal inferior wall hypokinesis.\par Mitral leaflet thickening calcification with mild MR\par Mildly dilated aortic root at the sinus Valsalva 4 cm and 3.9 in the ascending aorta.\par \par Echocardiogram 7/13/2022:\par Basal inferior wall hypokinesis with ejection fraction 55 to 60%\par Status post mitral valve ring with mild MR mild TR\par Mildly dilated aortic root\par Unchanged compared to prior study\par \par Echo 6/15/21\par EF 55-60%\par Impaired relaxation of diastolic  filling\par Mitral valve ring and repair is intact with trace mitral valve regurgitation\par Mild tricuspid regurgitation\par Mild pulmonic valve regurgitation\par Mild dilation of the ascending Aorta = 4.00cm\par TR jet inadequate for PA pressures\par \par Laboratory data\par \par ------3/20/20---6/18/20---4/29/21----9/24/21---1/27/22--7/8/22--5/26/3\par Chol---181------179-------154---------205--------131------149------166\par HDL---116-------104-------103---------95----------82-------84--------108\par LDL-----54-------65---------41---------99----------40-------53---------50\par ALT------54------------------------------55-----------61------58---------40\par Electrolytes normal\par A1c 5.6\par \par Lab data  11/22/2019 ( Dr. Gallego)\par Chol. 173\par LDL 66\par HDL 96\par Tri. 58\par Creat.1.04\par LFTs WNL\par HGB 15.0\par A1C 5.5\par \par \par ECHO 6/30/20\par Inferobasilar Hypokinesis with LVEF 65%\par s/p MV Repair with Mild MR\par Nl PAP\par \par Echocardiogram:  3/11/19\par Overall normal left atrial size and function with basal inferior wall hypokinesis.\par Mitral valve repair with no significant mitral regurgitation.\par RV size and function are normal\par No evidence of pulmonary hypertension\par Comparison with the prior study, the mitral valve repair is intact and is new and the basal inferior wall hypokinesis may be new.\par \par Impression/ PLAN\par \par 1.  Blood pressure continues to be controlled  with meds, diet and exercise\par     \par Weight loss is noted\par \par 2.  Echo with NL EF and unchanged ascending aorta dilation.\par      Basal inferior wall hypokinesis of uncertain significance. Patient did not have coronary disease.\par      Mitral valve repair looks excellent.\par \par 3.HLD -well-controlled\par \par 4. Edema has resolved.\par \par 5. No coronary symptoms.\par \par 6. Elevated LFTS with hx of fatty liver being watched.  Relatively stable.\par \par Plan;\par 1.Continue  Amlodipine at  2.5 mg day \par \par 2. He will continue to monitor his blood pressure regularly                                                                                                                                                            \par 3.Instructed the patient about the benefits of a diet that restricts portion sizes, increases frequency of meals and consists of  vegetables, (more green and leafy),fruit and nuts, whole grains, lean proteins and limits carbohydrates and meat and dairy fats\par \par 4. Repeat BW in 6 months\par \par 5. Continue weight loss. \par \par 6.  Echocardiogram in 1 year\par Clinical follow up in 6 months

## 2023-07-20 NOTE — PHYSICAL EXAM
[General Appearance - Well Developed] : well developed [Normal Appearance] : normal appearance [General Appearance - Well Nourished] : well nourished [Normal Conjunctiva] : the conjunctiva exhibited no abnormalities [Normal Jugular Venous V Waves Present] : normal jugular venous V waves present [Respiration, Rhythm And Depth] : normal respiratory rhythm and effort [Exaggerated Use Of Accessory Muscles For Inspiration] : no accessory muscle use [Abnormal Walk] : normal gait [Skin Color & Pigmentation] : normal skin color and pigmentation [Skin Turgor] : normal skin turgor [] : no rash [Oriented To Time, Place, And Person] : oriented to person, place, and time [Impaired Insight] : insight and judgment were intact [No Anxiety] : not feeling anxious [FreeTextEntry1] : S1-S2 grade 1/6 to 2/6 systolic ejection murmur  trace edema

## 2023-07-20 NOTE — REASON FOR VISIT
[FreeTextEntry1] : This is a 68  -year-old male presenting for cardiac reevaluation.\par \par 11/27/18 - robotic mitral valve replacement  via right-sided incision and ports.  \par He had presented with severe mitral valve prolapse, normal left ventricular systolic function, pulmonary hypertension, mild palpitations, and dyspnea on exertion.  \par \par Hypertension being managed with amlodipine, metoprolol and Dyazide.  Home blood pressure remains well controlled.  Home blood pressure average 123/80 mm Hg \par \par Due to pedal and ankle edema we decreased the Amlodipine to 2.5 mg  with good response. Right ankle does have a deformity due to old injury which tends to have baseline  trace- mild edema.\par \par Serial BMPs obtained to monitor renal fx.\par 3/10/21 0.95\par 4/29/21 1.64\par 5/21/21  1.17\par \par ABD. US 4/29/21 with diffuse hepatic stenosis\par Fully vaccinated\par \par  \par \par

## 2023-09-01 ENCOUNTER — APPOINTMENT (OUTPATIENT)
Dept: DERMATOLOGY | Facility: CLINIC | Age: 68
End: 2023-09-01

## 2023-09-29 RX ORDER — TRIAMTERENE AND HYDROCHLOROTHIAZIDE 37.5; 25 MG/1; MG/1
37.5-25 CAPSULE ORAL
Qty: 90 | Refills: 2 | Status: ACTIVE | COMMUNITY
Start: 2019-09-05 | End: 1900-01-01

## 2023-10-23 RX ORDER — METOPROLOL SUCCINATE 50 MG/1
50 TABLET, EXTENDED RELEASE ORAL DAILY
Qty: 90 | Refills: 2 | Status: ACTIVE | COMMUNITY
Start: 1900-01-01 | End: 1900-01-01

## 2023-12-05 NOTE — H&P PST ADULT - SURGICAL SITE INCISION
Price (Do Not Change): 0.00 no Instructions: This plan will send the code FBSE to the PM system.  DO NOT or CHANGE the price. Detail Level: Simple Chest pain

## 2024-02-16 ENCOUNTER — APPOINTMENT (OUTPATIENT)
Dept: CARDIOLOGY | Facility: CLINIC | Age: 69
End: 2024-02-16
Payer: MEDICARE

## 2024-02-16 VITALS
DIASTOLIC BLOOD PRESSURE: 90 MMHG | OXYGEN SATURATION: 98 % | BODY MASS INDEX: 27.63 KG/M2 | WEIGHT: 204 LBS | HEART RATE: 71 BPM | HEIGHT: 72 IN | SYSTOLIC BLOOD PRESSURE: 140 MMHG | RESPIRATION RATE: 16 BRPM

## 2024-02-16 DIAGNOSIS — I10 ESSENTIAL (PRIMARY) HYPERTENSION: ICD-10-CM

## 2024-02-16 DIAGNOSIS — I34.0 NONRHEUMATIC MITRAL (VALVE) INSUFFICIENCY: ICD-10-CM

## 2024-02-16 DIAGNOSIS — R60.0 LOCALIZED EDEMA: ICD-10-CM

## 2024-02-16 DIAGNOSIS — E78.5 HYPERLIPIDEMIA, UNSPECIFIED: ICD-10-CM

## 2024-02-16 PROCEDURE — 93000 ELECTROCARDIOGRAM COMPLETE: CPT

## 2024-02-16 PROCEDURE — 99214 OFFICE O/P EST MOD 30 MIN: CPT

## 2024-02-16 PROCEDURE — G2211 COMPLEX E/M VISIT ADD ON: CPT

## 2024-02-16 RX ORDER — ASPIRIN ENTERIC COATED TABLETS 81 MG 81 MG/1
81 TABLET, DELAYED RELEASE ORAL
Refills: 0 | Status: ACTIVE | COMMUNITY

## 2024-02-20 NOTE — REASON FOR VISIT
[FreeTextEntry1] : This is a 69 -year-old male presenting for cardiac reevaluation.  11/27/18 - robotic mitral valve replacement  via right-sided incision and ports.   He had presented with severe mitral valve prolapse, normal left ventricular systolic function, pulmonary hypertension, mild palpitations, and dyspnea on exertion.    Hypertension being managed with amlodipine, metoprolol and Dyazide.  Home blood pressure remains well controlled.  Home blood pressure average 130/83 mm Hg  Due to pedal and ankle edema we decreased the Amlodipine to 2.5 mg  with good response. Right ankle does have a deformity due to old injury which tends to have baseline  trace- mild edema.  Serial BMPs obtained to monitor renal fx. 3/10/21 0.95 4/29/21 1.64 5/21/21  1.17  ABD. US 4/29/21 with diffuse hepatic stenosis Fully vaccinated

## 2024-02-20 NOTE — ASSESSMENT
[FreeTextEntry1] : ECG SR at 71  bpm normal axis, intervals, no sign of ST or T wave changes, nondiagnostic Q's 3 and F  Echocardiogram 6/19/2023: Normal LV size and function LVEF 60 to 65% with a small area of basal inferior wall hypokinesis. Mitral leaflet thickening calcification with mild MR Mildly dilated aortic root at the sinus Valsalva 4 cm and 3.9 in the ascending aorta.  Echocardiogram 7/13/2022: Basal inferior wall hypokinesis with ejection fraction 55 to 60% Status post mitral valve ring with mild MR mild TR Mildly dilated aortic root Unchanged compared to prior study  Echo 6/15/21 EF 55-60% Impaired relaxation of diastolic  filling Mitral valve ring and repair is intact with trace mitral valve regurgitation Mild tricuspid regurgitation Mild pulmonic valve regurgitation Mild dilation of the ascending Aorta = 4.00cm TR jet inadequate for PA pressures  Laboratory data  ------3/20/20---6/18/20---4/29/21----9/24/21---1/27/22--7/8/22--5/26/23---2/2/24 Chol---181------179-------154---------205--------131------149------166----------154 HDL---116-------104-------103---------95----------82-------84--------108----------83 LDL-----54-------65---------41---------99-----------40-------53---------50-----------59 ALT------54------------------------------55------------61-------58---------40----------57 Electrolytes normal A1c 5.6  Lab data  11/22/2019 ( Dr. Gallego) Chol. 173 LDL 66 HDL 96 Tri. 58 Creat.1.04 LFTs WNL HGB 15.0 A1C 5.5  Echocardiogram 6/19/2023 Normal LV size and function with basal inferior wall hypokinesis ejection fraction of 60 to 65% Diastolic filling abnormality Mild mitral and tricuspid regurgitation Mildly dilated sinus of Valsalva 4 cm ascending aorta 3.9 cm  ECHO 6/30/20 Inferobasilar Hypokinesis with LVEF 65% s/p MV Repair with Mild MR Nl PAP  Echocardiogram:  3/11/19 Overall normal left atrial size and function with basal inferior wall hypokinesis. Mitral valve repair with no significant mitral regurgitation. RV size and function are normal No evidence of pulmonary hypertension Comparison with the prior study, the mitral valve repair is intact and is new and the basal inferior wall hypokinesis may be new.  Impression: 1. Blood pressure continues to be controlled with meds, diet and exercise. Weight is unchanged.  2.  Echo with NL EF and unchanged ascending aorta dilation.  Basal inferior wall hypokinesis of uncertain significance. Patient did not have  known hx of coronary disease.  Mitral valve repair looks excellent.  3. HLD -well-controlled.  4. Edema has resolved.  5. No coronary symptoms.  6. Elevated LFTS with hx of fatty liver being watched.  Relatively stable.  Plan: 1.Continue  Amlodipine at  2.5 mg day   2. He will continue to monitor his blood pressure regularly.                                                                                                                                                              3. Instructed the patient about the benefits of a diet that restricts portion sizes, increases frequency of meals and consists of vegetables, (more green and leafy),fruit and nuts, whole grains, lean proteins and limits carbohydrates and meat and dairy fats.  4.  In view of the inferior wall motion abnormality will obtain an exercise sestamibi stress test.    5. Repeat BW in 6 months.  6.  Echocardiogram on an annual basis.  Clinical follow up in 6 months

## 2024-02-28 ENCOUNTER — NON-APPOINTMENT (OUTPATIENT)
Age: 69
End: 2024-02-28

## 2024-03-21 NOTE — ASU PREOP CHECKLIST - ADVANCE DIRECTIVE ADDRESSED/READDRESSED
no rashes , no jaundice present , good turgor , no masses , no tenderness on palpation , no rashes , no jaundice present , good turgor , no masses , no tenderness on palpation
done

## 2024-06-19 ENCOUNTER — APPOINTMENT (OUTPATIENT)
Dept: CARDIOLOGY | Facility: CLINIC | Age: 69
End: 2024-06-19
Payer: MEDICARE

## 2024-06-19 PROCEDURE — A9500: CPT

## 2024-06-19 PROCEDURE — 93015 CV STRESS TEST SUPVJ I&R: CPT

## 2024-06-19 PROCEDURE — 78452 HT MUSCLE IMAGE SPECT MULT: CPT

## 2024-06-20 ENCOUNTER — APPOINTMENT (OUTPATIENT)
Dept: CARDIOLOGY | Facility: CLINIC | Age: 69
End: 2024-06-20

## 2024-07-08 ENCOUNTER — APPOINTMENT (OUTPATIENT)
Dept: CARDIOLOGY | Facility: CLINIC | Age: 69
End: 2024-07-08
Payer: MEDICARE

## 2024-07-08 PROCEDURE — 93306 TTE W/DOPPLER COMPLETE: CPT

## 2024-07-22 ENCOUNTER — APPOINTMENT (OUTPATIENT)
Dept: CARDIOLOGY | Facility: CLINIC | Age: 69
End: 2024-07-22
Payer: MEDICARE

## 2024-07-22 VITALS
SYSTOLIC BLOOD PRESSURE: 132 MMHG | HEART RATE: 77 BPM | HEIGHT: 72 IN | WEIGHT: 197 LBS | RESPIRATION RATE: 16 BRPM | BODY MASS INDEX: 26.68 KG/M2 | OXYGEN SATURATION: 98 % | DIASTOLIC BLOOD PRESSURE: 90 MMHG

## 2024-07-22 DIAGNOSIS — I10 ESSENTIAL (PRIMARY) HYPERTENSION: ICD-10-CM

## 2024-07-22 DIAGNOSIS — I34.0 NONRHEUMATIC MITRAL (VALVE) INSUFFICIENCY: ICD-10-CM

## 2024-07-22 DIAGNOSIS — E78.5 HYPERLIPIDEMIA, UNSPECIFIED: ICD-10-CM

## 2024-07-22 DIAGNOSIS — I38 ENDOCARDITIS, VALVE UNSPECIFIED: ICD-10-CM

## 2024-07-22 PROCEDURE — 99214 OFFICE O/P EST MOD 30 MIN: CPT

## 2024-07-22 PROCEDURE — 93000 ELECTROCARDIOGRAM COMPLETE: CPT

## 2024-07-22 NOTE — ASSESSMENT
[FreeTextEntry1] : ECG SR at 77  bpm normal axis, intervals, no sign of ST or T wave changes, nondiagnostic Q's 3 and F  Echocardiogram 6/19/2023: Normal LV size and function LVEF 60 to 65% with a small area of basal inferior wall hypokinesis. Mitral leaflet thickening calcification with mild MR Mildly dilated aortic root at the sinus Valsalva 4 cm and 3.9 in the ascending aorta.  Echocardiogram 7/13/2022: Basal inferior wall hypokinesis with ejection fraction 55 to 60% Status post mitral valve ring with mild MR mild TR Mildly dilated aortic root Unchanged compared to prior study  Echo 6/15/21 EF 55-60% Impaired relaxation of diastolic  filling Mitral valve ring and repair is intact with trace mitral valve regurgitation Mild tricuspid regurgitation Mild pulmonic valve regurgitation Mild dilation of the ascending Aorta = 4.00cm TR jet inadequate for PA pressures  Laboratory data  ------3/20/20---6/18/20---4/29/21----9/24/21---1/27/22--7/8/22--5/26/23---2/2/24--6/20/24 Chol---181------179-------154---------205--------131------149------166----------154-----170 HDL---116-------104-------103---------95----------82-------84--------108----------83-------84 LDL-----54-------65---------41---------99-----------40-------53---------50-----------59-------75 ALT------54------------------------------55------------61-------58---------40----------57--------57 H3u-----7.6--------------------------------------------------------------------------------------------5.8  Lab data  11/22/2019 ( Dr. Gallego) Chol. 173 LDL 66 HDL 96 Tri. 58 Creat.1.04 LFTs WNL HGB 15.0 A1C 5.5  Echocardiogram 7/8/2024 Basal inferior wall hypokinesis but LVEF 65 to 70% Mitral valve repair is intact No evidence of pulm hypertension. Ascending aorta 3.9 cm.  Echocardiogram 6/19/2023 Normal LV size and function with basal inferior wall hypokinesis ejection fraction of 60 to 65% Diastolic filling abnormality Mild mitral and tricuspid regurgitation Mildly dilated sinus of Valsalva 4 cm ascending aorta 3.9 cm  ECHO 6/30/20 Inferobasilar Hypokinesis with LVEF 65% s/p MV Repair with Mild MR Nl PAP  Echocardiogram:  3/11/19 Overall normal left atrial size and function with basal inferior wall hypokinesis. Mitral valve repair with no significant mitral regurgitation. RV size and function are normal No evidence of pulmonary hypertension Comparison with the prior study, the mitral valve repair is intact and is new and the basal inferior wall hypokinesis may be new.  Pharmacologic nuclear stress 6/19/2024 No evidence of ischemia  Inferior attenuation artifact though cannot exclude a small area of scarring.  Impression: 1. Blood pressure continues to be controlled with meds averaging 130/83., diet and exercise. Weight continues to improve.  2.  Echo with NL EF and unchanged ascending aorta dilation.  Basal inferior wall hypokinesis of uncertain significance. Patient did not have  known hx of coronary disease.  Mitral valve repair looks excellent.  3. HLD -well-controlled.  4.  Mitral valve repair appears to be intact.  5. No coronary symptoms.  6. Elevated LFTS with hx of fatty liver being watched.  Relatively stable.  Plan: 1.Continue  Amlodipine at  2.5 mg day   2. He will continue to monitor his blood pressure regularly.                                                                                                                                                              3. Instructed the patient about the benefits of a diet that restricts portion sizes, increases frequency of meals and consists of vegetables, (more green and leafy),fruit and nuts, whole grains, lean proteins and limits carbohydrates and meat and dairy fats.  4.  Repeat BW in 6 months.  5..  Echocardiogram on an annual basis.  Clinical follow up in 6 months

## 2024-07-22 NOTE — REASON FOR VISIT
[FreeTextEntry1] : This is a 69 -year-old male presenting for cardiac reevaluation.  11/27/18 - robotic mitral valve replacement  via right-sided incision and ports.   He had presented with severe mitral valve prolapse, normal left ventricular systolic function, pulmonary hypertension, mild palpitations, and dyspnea on exertion.    Hypertension being managed with amlodipine, metoprolol and Dyazide.  Home blood pressure remains well controlled.  Home blood pressure average 130/83 mm Hg  Due to pedal and ankle edema we decreased the Amlodipine to 2.5 mg  with good response. Right ankle does have a deformity due to old injury which tends to have baseline  trace- mild edema.  Serial BMPs obtained to monitor renal fx. 3/10/21 0.95 4/29/21 1.64 5/21/21  1.17  ABD. US 4/29/21 with diffuse hepatic stenosis

## 2024-07-22 NOTE — HISTORY OF PRESENT ILLNESS
[FreeTextEntry1] : His history includes: 1.	Hypertension. 2.	Hyperlipidemia. 3.	Valvular heart disease. 4.	Moderately severe mitral regurgitation surgically corrected  BW and echo will be discussed.

## 2024-08-11 NOTE — ED ADULT TRIAGE NOTE - CHIEF COMPLAINT QUOTE
Spontaneous, unlabored and symmetrical
incision on right chest for mitral valve replacement Thursday with Dr. Levin @ [facility]. Reports he was standing out of bed when pain suddenly increased.

## 2024-11-29 NOTE — PHYSICAL THERAPY INITIAL EVALUATION ADULT - ADDITIONAL COMMENTS
Pt. lives in one level house with no step to enter from side. Pt. was amb w/o AD prior to sx. No abnormalities noted

## 2025-01-06 ENCOUNTER — APPOINTMENT (OUTPATIENT)
Dept: ORTHOPEDIC SURGERY | Facility: CLINIC | Age: 70
End: 2025-01-06

## 2025-02-25 NOTE — ASU PREOP CHECKLIST - BP NONINVASIVE SYSTOLIC (MM HG)
For information on Fall & Injury Prevention, visit: [email or web address] OR  [email or web address] OR  https://www.cdc.gov/steadi/patient.html 144

## 2025-03-06 ENCOUNTER — APPOINTMENT (OUTPATIENT)
Dept: CARDIOLOGY | Facility: CLINIC | Age: 70
End: 2025-03-06
Payer: MEDICARE

## 2025-03-06 VITALS
HEART RATE: 76 BPM | SYSTOLIC BLOOD PRESSURE: 112 MMHG | WEIGHT: 202 LBS | DIASTOLIC BLOOD PRESSURE: 80 MMHG | OXYGEN SATURATION: 96 % | HEIGHT: 72 IN | BODY MASS INDEX: 27.36 KG/M2 | RESPIRATION RATE: 16 BRPM

## 2025-03-06 DIAGNOSIS — I10 ESSENTIAL (PRIMARY) HYPERTENSION: ICD-10-CM

## 2025-03-06 DIAGNOSIS — I34.0 NONRHEUMATIC MITRAL (VALVE) INSUFFICIENCY: ICD-10-CM

## 2025-03-06 DIAGNOSIS — E78.5 HYPERLIPIDEMIA, UNSPECIFIED: ICD-10-CM

## 2025-03-06 PROCEDURE — 93000 ELECTROCARDIOGRAM COMPLETE: CPT

## 2025-03-06 PROCEDURE — 99214 OFFICE O/P EST MOD 30 MIN: CPT

## 2025-03-06 PROCEDURE — G2211 COMPLEX E/M VISIT ADD ON: CPT

## 2025-03-06 RX ORDER — BENRALIZUMAB 10 MG/.5ML
10 INJECTION, SOLUTION SUBCUTANEOUS
Qty: 1 | Refills: 0 | Status: ACTIVE | COMMUNITY
Start: 2025-03-06

## 2025-04-02 NOTE — ASU PREOP CHECKLIST - NS PREOP CHK CHLOROHEX WASH
New undiagnosed problem with unclear prognosis  1st MTP arthritic changes and numbness in all toes which is likely related to diabetic neuropathy  Referral placed in system for podiatry for evaluation and treatment     Orders:    XR foot 3+ vw right; Future    XR spine lumbar 2 or 3 views injury; Future    Ambulatory Referral to Podiatry; Future     at home:

## 2025-05-07 ENCOUNTER — NON-APPOINTMENT (OUTPATIENT)
Age: 70
End: 2025-05-07

## 2025-05-07 ENCOUNTER — APPOINTMENT (OUTPATIENT)
Dept: OTOLARYNGOLOGY | Facility: CLINIC | Age: 70
End: 2025-05-07
Payer: MEDICARE

## 2025-05-07 VITALS
SYSTOLIC BLOOD PRESSURE: 117 MMHG | HEIGHT: 72 IN | DIASTOLIC BLOOD PRESSURE: 76 MMHG | BODY MASS INDEX: 24.52 KG/M2 | HEART RATE: 70 BPM | WEIGHT: 181 LBS

## 2025-05-07 DIAGNOSIS — J32.0 CHRONIC MAXILLARY SINUSITIS: ICD-10-CM

## 2025-05-07 DIAGNOSIS — H90.A31 MIXED CONDUCTIVE AND SENSORINEURAL HEARING LOSS, UNILATERAL, RIGHT EAR WITH RESTRICTED HEARING ON THE  CONTRALATERAL SIDE: ICD-10-CM

## 2025-05-07 DIAGNOSIS — H93.11 TINNITUS, RIGHT EAR: ICD-10-CM

## 2025-05-07 DIAGNOSIS — H65.01 ACUTE SEROUS OTITIS MEDIA, RIGHT EAR: ICD-10-CM

## 2025-05-07 DIAGNOSIS — H90.3 SENSORINEURAL HEARING LOSS, BILATERAL: ICD-10-CM

## 2025-05-07 DIAGNOSIS — J33.9 NASAL POLYP, UNSPECIFIED: ICD-10-CM

## 2025-05-07 DIAGNOSIS — H69.91 UNSPECIFIED EUSTACHIAN TUBE DISORDER, RIGHT EAR: ICD-10-CM

## 2025-05-07 PROCEDURE — 99214 OFFICE O/P EST MOD 30 MIN: CPT | Mod: 25

## 2025-05-07 PROCEDURE — 92557 COMPREHENSIVE HEARING TEST: CPT

## 2025-05-07 PROCEDURE — 99204 OFFICE O/P NEW MOD 45 MIN: CPT | Mod: 25

## 2025-05-07 PROCEDURE — 31231 NASAL ENDOSCOPY DX: CPT

## 2025-05-07 PROCEDURE — 92567 TYMPANOMETRY: CPT

## 2025-05-07 RX ORDER — FLUTICASONE PROPIONATE 50 UG/1
50 SPRAY, METERED NASAL DAILY
Qty: 1 | Refills: 1 | Status: ACTIVE | COMMUNITY
Start: 2025-05-07 | End: 1900-01-01

## 2025-05-13 ENCOUNTER — APPOINTMENT (OUTPATIENT)
Dept: UROLOGY | Facility: CLINIC | Age: 70
End: 2025-05-13
Payer: MEDICARE

## 2025-05-13 VITALS
HEIGHT: 72 IN | DIASTOLIC BLOOD PRESSURE: 83 MMHG | BODY MASS INDEX: 24.52 KG/M2 | SYSTOLIC BLOOD PRESSURE: 133 MMHG | WEIGHT: 181 LBS

## 2025-05-13 DIAGNOSIS — R35.1 NOCTURIA: ICD-10-CM

## 2025-05-13 DIAGNOSIS — R68.82 DECREASED LIBIDO: ICD-10-CM

## 2025-05-13 PROCEDURE — 99204 OFFICE O/P NEW MOD 45 MIN: CPT

## 2025-05-13 PROCEDURE — 76857 US EXAM PELVIC LIMITED: CPT

## 2025-05-13 PROCEDURE — 99214 OFFICE O/P EST MOD 30 MIN: CPT

## 2025-05-29 ENCOUNTER — APPOINTMENT (OUTPATIENT)
Dept: UROLOGY | Facility: CLINIC | Age: 70
End: 2025-05-29

## 2025-06-05 ENCOUNTER — EMERGENCY (EMERGENCY)
Facility: HOSPITAL | Age: 70
LOS: 0 days | Discharge: HOME | End: 2025-06-05
Attending: STUDENT IN AN ORGANIZED HEALTH CARE EDUCATION/TRAINING PROGRAM
Payer: MEDICARE

## 2025-06-05 VITALS
DIASTOLIC BLOOD PRESSURE: 87 MMHG | SYSTOLIC BLOOD PRESSURE: 148 MMHG | HEART RATE: 79 BPM | RESPIRATION RATE: 19 BRPM | OXYGEN SATURATION: 96 % | TEMPERATURE: 98 F

## 2025-06-05 VITALS
RESPIRATION RATE: 16 BRPM | TEMPERATURE: 98 F | OXYGEN SATURATION: 98 % | HEIGHT: 68 IN | WEIGHT: 175.93 LBS | DIASTOLIC BLOOD PRESSURE: 91 MMHG | SYSTOLIC BLOOD PRESSURE: 155 MMHG | HEART RATE: 80 BPM

## 2025-06-05 DIAGNOSIS — Z98.890 OTHER SPECIFIED POSTPROCEDURAL STATES: Chronic | ICD-10-CM

## 2025-06-05 DIAGNOSIS — I10 ESSENTIAL (PRIMARY) HYPERTENSION: ICD-10-CM

## 2025-06-05 DIAGNOSIS — Z96.641 PRESENCE OF RIGHT ARTIFICIAL HIP JOINT: Chronic | ICD-10-CM

## 2025-06-05 DIAGNOSIS — J45.901 UNSPECIFIED ASTHMA WITH (ACUTE) EXACERBATION: ICD-10-CM

## 2025-06-05 DIAGNOSIS — Z96.642 PRESENCE OF LEFT ARTIFICIAL HIP JOINT: Chronic | ICD-10-CM

## 2025-06-05 LAB
ALBUMIN SERPL ELPH-MCNC: 3.5 G/DL — SIGNIFICANT CHANGE UP (ref 3.3–5)
ALP SERPL-CCNC: 84 U/L — SIGNIFICANT CHANGE UP (ref 40–120)
ALT FLD-CCNC: 30 U/L — SIGNIFICANT CHANGE UP (ref 12–78)
ANION GAP SERPL CALC-SCNC: 10 MMOL/L — SIGNIFICANT CHANGE UP (ref 5–17)
AST SERPL-CCNC: 23 U/L — SIGNIFICANT CHANGE UP (ref 15–37)
BASOPHILS # BLD AUTO: 0.01 K/UL — SIGNIFICANT CHANGE UP (ref 0–0.2)
BASOPHILS NFR BLD AUTO: 0.2 % — SIGNIFICANT CHANGE UP (ref 0–2)
BILIRUB SERPL-MCNC: 0.7 MG/DL — SIGNIFICANT CHANGE UP (ref 0.2–1.2)
BUN SERPL-MCNC: 16 MG/DL — SIGNIFICANT CHANGE UP (ref 7–23)
CALCIUM SERPL-MCNC: 9.7 MG/DL — SIGNIFICANT CHANGE UP (ref 8.5–10.1)
CHLORIDE SERPL-SCNC: 104 MMOL/L — SIGNIFICANT CHANGE UP (ref 96–108)
CO2 SERPL-SCNC: 21 MMOL/L — LOW (ref 22–31)
CREAT SERPL-MCNC: 0.59 MG/DL — SIGNIFICANT CHANGE UP (ref 0.5–1.3)
EGFR: 104 ML/MIN/1.73M2 — SIGNIFICANT CHANGE UP
EGFR: 104 ML/MIN/1.73M2 — SIGNIFICANT CHANGE UP
EOSINOPHIL # BLD AUTO: 0 K/UL — SIGNIFICANT CHANGE UP (ref 0–0.5)
EOSINOPHIL NFR BLD AUTO: 0 % — SIGNIFICANT CHANGE UP (ref 0–6)
FLUAV AG NPH QL: SIGNIFICANT CHANGE UP
FLUBV AG NPH QL: SIGNIFICANT CHANGE UP
GLUCOSE SERPL-MCNC: 99 MG/DL — SIGNIFICANT CHANGE UP (ref 70–99)
HCT VFR BLD CALC: 40.4 % — SIGNIFICANT CHANGE UP (ref 39–50)
HGB BLD-MCNC: 13.2 G/DL — SIGNIFICANT CHANGE UP (ref 13–17)
IMM GRANULOCYTES # BLD AUTO: 0.01 K/UL — SIGNIFICANT CHANGE UP (ref 0–0.07)
IMM GRANULOCYTES NFR BLD AUTO: 0.2 % — SIGNIFICANT CHANGE UP (ref 0–0.9)
LYMPHOCYTES # BLD AUTO: 0.65 K/UL — LOW (ref 1–3.3)
LYMPHOCYTES NFR BLD AUTO: 12.6 % — LOW (ref 13–44)
MCHC RBC-ENTMCNC: 29.3 PG — SIGNIFICANT CHANGE UP (ref 27–34)
MCHC RBC-ENTMCNC: 32.7 G/DL — SIGNIFICANT CHANGE UP (ref 32–36)
MCV RBC AUTO: 89.8 FL — SIGNIFICANT CHANGE UP (ref 80–100)
MONOCYTES # BLD AUTO: 0.56 K/UL — SIGNIFICANT CHANGE UP (ref 0–0.9)
MONOCYTES NFR BLD AUTO: 10.9 % — SIGNIFICANT CHANGE UP (ref 2–14)
NEUTROPHILS # BLD AUTO: 3.92 K/UL — SIGNIFICANT CHANGE UP (ref 1.8–7.4)
NEUTROPHILS NFR BLD AUTO: 76.1 % — SIGNIFICANT CHANGE UP (ref 43–77)
NRBC # BLD AUTO: 0 K/UL — SIGNIFICANT CHANGE UP (ref 0–0)
NRBC # FLD: 0 K/UL — SIGNIFICANT CHANGE UP (ref 0–0)
NRBC BLD AUTO-RTO: 0 /100 WBCS — SIGNIFICANT CHANGE UP (ref 0–0)
NT-PROBNP SERPL-SCNC: 132 PG/ML — HIGH (ref 0–125)
PLATELET # BLD AUTO: 184 K/UL — SIGNIFICANT CHANGE UP (ref 150–400)
PMV BLD: 10.7 FL — SIGNIFICANT CHANGE UP (ref 7–13)
POTASSIUM SERPL-MCNC: 3.5 MMOL/L — SIGNIFICANT CHANGE UP (ref 3.5–5.3)
POTASSIUM SERPL-SCNC: 3.5 MMOL/L — SIGNIFICANT CHANGE UP (ref 3.5–5.3)
PROT SERPL-MCNC: 7.3 GM/DL — SIGNIFICANT CHANGE UP (ref 6–8.3)
RBC # BLD: 4.5 M/UL — SIGNIFICANT CHANGE UP (ref 4.2–5.8)
RBC # FLD: 15.5 % — HIGH (ref 10.3–14.5)
RSV RNA NPH QL NAA+NON-PROBE: SIGNIFICANT CHANGE UP
SARS-COV-2 RNA SPEC QL NAA+PROBE: SIGNIFICANT CHANGE UP
SODIUM SERPL-SCNC: 135 MMOL/L — SIGNIFICANT CHANGE UP (ref 135–145)
SOURCE RESPIRATORY: SIGNIFICANT CHANGE UP
TROPONIN I, HIGH SENSITIVITY RESULT: 4.17 NG/L — SIGNIFICANT CHANGE UP
WBC # BLD: 5.15 K/UL — SIGNIFICANT CHANGE UP (ref 3.8–10.5)
WBC # FLD AUTO: 5.15 K/UL — SIGNIFICANT CHANGE UP (ref 3.8–10.5)

## 2025-06-05 PROCEDURE — 94640 AIRWAY INHALATION TREATMENT: CPT

## 2025-06-05 PROCEDURE — 99285 EMERGENCY DEPT VISIT HI MDM: CPT | Mod: FS

## 2025-06-05 PROCEDURE — 80053 COMPREHEN METABOLIC PANEL: CPT

## 2025-06-05 PROCEDURE — 36000 PLACE NEEDLE IN VEIN: CPT

## 2025-06-05 PROCEDURE — 71045 X-RAY EXAM CHEST 1 VIEW: CPT | Mod: 26

## 2025-06-05 PROCEDURE — 36415 COLL VENOUS BLD VENIPUNCTURE: CPT

## 2025-06-05 PROCEDURE — 83880 ASSAY OF NATRIURETIC PEPTIDE: CPT

## 2025-06-05 PROCEDURE — 0241U: CPT

## 2025-06-05 PROCEDURE — 99285 EMERGENCY DEPT VISIT HI MDM: CPT | Mod: 25

## 2025-06-05 PROCEDURE — 71045 X-RAY EXAM CHEST 1 VIEW: CPT

## 2025-06-05 PROCEDURE — 93005 ELECTROCARDIOGRAM TRACING: CPT

## 2025-06-05 PROCEDURE — 82962 GLUCOSE BLOOD TEST: CPT

## 2025-06-05 PROCEDURE — 93010 ELECTROCARDIOGRAM REPORT: CPT | Mod: 76

## 2025-06-05 PROCEDURE — 84484 ASSAY OF TROPONIN QUANT: CPT

## 2025-06-05 PROCEDURE — 85025 COMPLETE CBC W/AUTO DIFF WBC: CPT

## 2025-06-05 RX ORDER — IPRATROPIUM BROMIDE AND ALBUTEROL SULFATE .5; 2.5 MG/3ML; MG/3ML
3 SOLUTION RESPIRATORY (INHALATION)
Refills: 0 | Status: COMPLETED | OUTPATIENT
Start: 2025-06-05 | End: 2025-06-05

## 2025-06-05 RX ADMIN — IPRATROPIUM BROMIDE AND ALBUTEROL SULFATE 3 MILLILITER(S): .5; 2.5 SOLUTION RESPIRATORY (INHALATION) at 15:54

## 2025-06-05 RX ADMIN — IPRATROPIUM BROMIDE AND ALBUTEROL SULFATE 3 MILLILITER(S): .5; 2.5 SOLUTION RESPIRATORY (INHALATION) at 15:55

## 2025-06-05 RX ADMIN — IPRATROPIUM BROMIDE AND ALBUTEROL SULFATE 3 MILLILITER(S): .5; 2.5 SOLUTION RESPIRATORY (INHALATION) at 15:58

## 2025-06-05 NOTE — ED PROVIDER NOTE - PHYSICAL EXAMINATION
General: Well appearing in no acute distress, alert and cooperative  Eyes: PERRLA.   ENMT: Dry mucous membranes, oropharynx clear  Neck: Soft and supple, full ROM without pain, no midline tenderness  Cardiac: Regular rate and regular rhythm, no murmurs  Resp: Unlabored respiratory effort, lungs with decreased air movement in b/l lung fields.   Abd: Soft, non-tender, non-distended, no guarding or rebound tenderness  MSK: Spine midline and non-tender  Skin: Warm and dry, no rashes/abrasions/lacerations  Neuro: AO x 3, moves all extremities symmetrically, Motor strength 5/5 bilaterally UE and LE, sensation grossly intact.

## 2025-06-05 NOTE — ED PROVIDER NOTE - CLINICAL SUMMARY MEDICAL DECISION MAKING FREE TEXT BOX
elderly male with hx of asthma p/w near syncopal episode while outside. Reports missing advair this morning. Reporting chest tightness. Now in the ED back to baseline. On exam, appears dehyrated.    Plan to r/o acs, arrythmia. check labs, xr, ekg, monitor, fluids, reassess

## 2025-06-05 NOTE — ED ADULT TRIAGE NOTE - AS PAIN REST
2022       To:  Whom it may concern    Re:  Jose Hickey  : 1978  MRN:  [id number]    Date of Service:  22                           Date of Injury:  4/15/22  Time Arrived:  9:15 AM Time Left:  9:57 AM    DIAGNOSIS (ICD-10):  Possible rotator cuff tear    TREATMENT:  MRI left shoulder   Medications include:  Meloxicam 15 mg    Patient is able to return to modified work with the following restrictions: no use of left arm    FOLLOW-UP:  Patient is to follow up after the completion of MRI to go over the results.    If you should have any questions regarding this note, please do not hesitate in calling our office.      Jeferson Cee MD    [address]  [address] [address]     3 (mild pain)

## 2025-06-05 NOTE — ED PROVIDER NOTE - NSICDXPASTMEDICALHX_GEN_ALL_CORE_FT
PAST MEDICAL HISTORY:  Asthma     Diverticulosis of intestine without bleeding, unspecified intestinal tract location     Essential hypertension     Fatty liver     Hiatal hernia     History of kidney stones     Lumbar herniated disc     MVP (mitral valve prolapse)     Nasal polyps history of. Present    Osteoarthritis left hip    Osteoarthritis of right hip, unspecified osteoarthritis type     Seasonal allergic rhinitis due to pollen     Tinnitus of both ears

## 2025-06-05 NOTE — ED PROVIDER NOTE - CARE PLAN
1 Principal Discharge DX:	Near syncope  Secondary Diagnosis:	Vasovagal episode  Secondary Diagnosis:	Asthma exacerbation

## 2025-06-05 NOTE — ED ADULT NURSE NOTE - OBJECTIVE STATEMENT
pt presented to the er c/o near syncope this afternoon while outside. reports he did not have much to eat or drink today and did not have his dose of advair this morning. endorsing chest tightness and numbness to face and legs. symptoms improved with duoneb treatments. family at bedside. safety and comfort measures in place.

## 2025-06-05 NOTE — ED PROVIDER NOTE - PATIENT PORTAL LINK FT
You can access the FollowMyHealth Patient Portal offered by [facility] by registering at the following website: [email or web address]. By joining [facility]’s FollowMyHealth portal, you will also be able to view your health information using other applications (apps) compatible with our system.

## 2025-06-05 NOTE — ED PROVIDER NOTE - OBJECTIVE STATEMENT
69 y/o M with pmhx of asthma (on daily Advair and Fasenra), HTN who presents to the ED c/o near syncope episode this afternoon. Pt was at a field day for his grandson when he started to feel SOB, chest tightness, nausea and lightheadedness. Pt states he felt like he was going to pass out. Pt states he did not take his Advair this morning since he forgot. Pt also did not eat or drink water today. At this time, pt reports that nausea and chest tightness have improved. However still feels weak. Of note, pt has been doing outside work yesterday and is unsure if poor air quality exacerbated his asthma today. Denies f/c, palpitations, vomiting, recent cough/hemoptysis. Denies LOC. Non-smoker. Pt has a pulmonologist that he follows up with.

## 2025-06-05 NOTE — ED ADULT TRIAGE NOTE - CHIEF COMPLAINT QUOTE
patient brought in by EMS c/o near syncope.  was outside at his grandsons field day, started feeling weak like he was going to faint.  c/o mild chest discomfort.  given 3 baby ASA PTA.  .  wife reports patient did not take Advair this morning or eat much.

## 2025-06-05 NOTE — ED PROVIDER NOTE - PROGRESS NOTE DETAILS
Labs reviewed- within normal limits. trop negative. BNP within normal limits. CXR with no acute findings. ECG with no ischemic changes. pt was given duonebs in the ED with significant improvement in sx. Pt given water and crackers. Pt ambulated around the ED, no decreased o2 sat. Pt feels well enough to go home at this time. Advised pt to f/u with PCP/pulm. Pt understands. Stable for d/c at this time.

## 2025-06-05 NOTE — ED PROVIDER NOTE - NSICDXPASTSURGICALHX_GEN_ALL_CORE_FT
Discharge Planning:     Rapid recovery referral received and appreciated. Met with patient, discussed home care needs. Discussed homebound status. Verified demographic info is correct as found in Epic. Explained hospital affiliation with [facility]. Discussed home care choice. Patient declined agency list and is agreeable to [facility]. All questions regarding home care answered. Case Management to follow for additional home care needs.        PAST SURGICAL HISTORY:  H/O arthroscopy of shoulder left 2/ 2016    H/O hernia repair 11/3/2016    H/O lithotripsy 2015    H/O sinus surgery x 3    History of hip replacement, total, right 07/2017    History of total hip replacement, left     S/P foot surgery, right right ankle 1979    S/P left knee arthroscopy 11/ 2021

## 2025-06-05 NOTE — ED ADULT NURSE NOTE - NSFALLUNIVINTERV_ED_ALL_ED
Bed/Stretcher in lowest position, wheels locked, appropriate side rails in place/Call bell, personal items and telephone in reach/Instruct patient to call for assistance before getting out of bed/chair/stretcher/Non-slip footwear applied when patient is off stretcher/[place] to call system/Physically safe environment - no spills, clutter or unnecessary equipment/Purposeful proactive rounding/Room/bathroom lighting operational, light cord in reach

## 2025-06-05 NOTE — ED PROVIDER NOTE - NSFOLLOWUPINSTRUCTIONS_ED_ALL_ED_FT
Advance activity as tolerated.  Continue all previously prescribed medications as directed unless otherwise instructed.  Follow up with your primary care physician in 48-72 hours- bring copies of your results.  Return to the ER for worsening or persistent symptoms, and/or ANY NEW OR CONCERNING SYMPTOMS.     Your Care Instructions  Vasovagal syncope (say "Sarah Mathis")is sudden dizziness or fainting that can be set off by things such as pain, stress, fear, or trauma. You may sweat or feel light-headed, sick to your stomach, or tingly.    The problem causes the heart rate to slow and the blood vessels to widen, or dilate, for a short time. When this happens, blood pools in the lower body, and less blood goes to the brain.    You can usually get relief by lying down with your legs raised (elevated). This helps more blood to flow to your brain and may help relieve symptoms like feeling dizzy. Some doctors may recommend a technique that involves tensing your fists and arms.    This type of fainting is often easy to predict. For example, it happens to some people when they see blood or have to get a shot. They may feel symptoms before they faint.    An episode of vasovagal syncope usually responds well to self-care. Other treatment often isn't needed. But if the fainting keeps happening, your doctor may suggest further treatments.    Follow-up care is a key part of your treatment and safety. Be sure to make and go to all appointments, and call your doctor or nurse call line if you are having problems. It's also a good idea to know your test results and keep a list of the medicines you take.    How can you care for yourself at home?  Drink plenty of fluids to prevent dehydration. If you have kidney, heart, or liver disease and have to limit fluids, talk with your doctor before you increase your fluid intake.  Try to avoid things that you think may set off vasovagal syncope.  Talk to your doctor about any medicines you take. Some medicines may increase the chance of this condition occurring.  If you feel symptoms, lie down with your legs raised. Talk to your doctor about what to do if your symptoms come back.  When should you call for help?  	  Call 911 anytime you think you may need emergency care. For example, call if:    You have symptoms of a heart problem. These may include:  Chest pain or pressure.  Severe trouble breathing.  A fast or irregular heartbeat.  Watch closely for changes in your health, and be sure to contact your doctor or nurse call line if:    You have more episodes of fainting at home.  You do not get better as expected.

## 2025-06-09 ENCOUNTER — APPOINTMENT (OUTPATIENT)
Dept: OTOLARYNGOLOGY | Facility: CLINIC | Age: 70
End: 2025-06-09

## 2025-07-02 ENCOUNTER — APPOINTMENT (OUTPATIENT)
Dept: CARDIOLOGY | Facility: CLINIC | Age: 70
End: 2025-07-02

## 2025-07-02 PROCEDURE — 93306 TTE W/DOPPLER COMPLETE: CPT

## 2025-07-29 ENCOUNTER — APPOINTMENT (OUTPATIENT)
Dept: CARDIOLOGY | Facility: CLINIC | Age: 70
End: 2025-07-29
Payer: MEDICARE

## 2025-07-29 VITALS
WEIGHT: 180 LBS | DIASTOLIC BLOOD PRESSURE: 70 MMHG | HEART RATE: 69 BPM | OXYGEN SATURATION: 96 % | RESPIRATION RATE: 16 BRPM | BODY MASS INDEX: 24.38 KG/M2 | HEIGHT: 72 IN | SYSTOLIC BLOOD PRESSURE: 110 MMHG

## 2025-07-29 DIAGNOSIS — I34.0 NONRHEUMATIC MITRAL (VALVE) INSUFFICIENCY: ICD-10-CM

## 2025-07-29 DIAGNOSIS — I10 ESSENTIAL (PRIMARY) HYPERTENSION: ICD-10-CM

## 2025-07-29 DIAGNOSIS — E78.5 HYPERLIPIDEMIA, UNSPECIFIED: ICD-10-CM

## 2025-07-29 PROCEDURE — 99214 OFFICE O/P EST MOD 30 MIN: CPT

## 2025-07-29 PROCEDURE — G2211 COMPLEX E/M VISIT ADD ON: CPT

## 2025-07-29 PROCEDURE — 93000 ELECTROCARDIOGRAM COMPLETE: CPT
